# Patient Record
Sex: FEMALE | Race: WHITE | Employment: OTHER | URBAN - NONMETROPOLITAN AREA
[De-identification: names, ages, dates, MRNs, and addresses within clinical notes are randomized per-mention and may not be internally consistent; named-entity substitution may affect disease eponyms.]

---

## 2018-04-04 ENCOUNTER — OFFICE VISIT (OUTPATIENT)
Dept: PRIMARY CARE CLINIC | Age: 83
End: 2018-04-04
Payer: MEDICARE

## 2018-04-04 VITALS
SYSTOLIC BLOOD PRESSURE: 138 MMHG | WEIGHT: 108 LBS | HEIGHT: 62 IN | BODY MASS INDEX: 19.88 KG/M2 | DIASTOLIC BLOOD PRESSURE: 60 MMHG | HEART RATE: 56 BPM | OXYGEN SATURATION: 94 % | TEMPERATURE: 99.7 F

## 2018-04-04 DIAGNOSIS — R05.9 COUGH: ICD-10-CM

## 2018-04-04 DIAGNOSIS — J11.1 INFLUENZA-LIKE ILLNESS: Primary | ICD-10-CM

## 2018-04-04 LAB
INFLUENZA A ANTIBODY: NORMAL
INFLUENZA B ANTIBODY: NORMAL

## 2018-04-04 PROCEDURE — G8420 CALC BMI NORM PARAMETERS: HCPCS | Performed by: NURSE PRACTITIONER

## 2018-04-04 PROCEDURE — 4004F PT TOBACCO SCREEN RCVD TLK: CPT | Performed by: NURSE PRACTITIONER

## 2018-04-04 PROCEDURE — 87804 INFLUENZA ASSAY W/OPTIC: CPT | Performed by: NURSE PRACTITIONER

## 2018-04-04 PROCEDURE — 1090F PRES/ABSN URINE INCON ASSESS: CPT | Performed by: NURSE PRACTITIONER

## 2018-04-04 PROCEDURE — G8427 DOCREV CUR MEDS BY ELIG CLIN: HCPCS | Performed by: NURSE PRACTITIONER

## 2018-04-04 PROCEDURE — 99213 OFFICE O/P EST LOW 20 MIN: CPT | Performed by: NURSE PRACTITIONER

## 2018-04-04 PROCEDURE — 1123F ACP DISCUSS/DSCN MKR DOCD: CPT | Performed by: NURSE PRACTITIONER

## 2018-04-04 PROCEDURE — 4040F PNEUMOC VAC/ADMIN/RCVD: CPT | Performed by: NURSE PRACTITIONER

## 2018-04-04 RX ORDER — METOPROLOL TARTRATE 50 MG/1
25 TABLET, FILM COATED ORAL 2 TIMES DAILY
COMMUNITY
Start: 2018-01-05 | End: 2018-04-24 | Stop reason: SDUPTHER

## 2018-04-04 RX ORDER — HYDRALAZINE HYDROCHLORIDE 25 MG/1
25 TABLET, FILM COATED ORAL 2 TIMES DAILY
COMMUNITY
Start: 2018-03-11 | End: 2019-03-15 | Stop reason: SDUPTHER

## 2018-04-04 RX ORDER — EZETIMIBE 10 MG/1
TABLET ORAL
COMMUNITY
Start: 2018-02-07 | End: 2019-02-06 | Stop reason: SDUPTHER

## 2018-04-04 RX ORDER — BENZONATATE 100 MG/1
100 CAPSULE ORAL 3 TIMES DAILY PRN
Qty: 30 CAPSULE | Refills: 0 | Status: SHIPPED | OUTPATIENT
Start: 2018-04-04 | End: 2018-06-22 | Stop reason: ALTCHOICE

## 2018-04-04 RX ORDER — AMLODIPINE BESYLATE 10 MG/1
TABLET ORAL
COMMUNITY
Start: 2018-02-28 | End: 2018-04-24

## 2018-04-04 RX ORDER — OXYCODONE AND ACETAMINOPHEN 7.5; 325 MG/1; MG/1
TABLET ORAL
COMMUNITY
Start: 2018-03-11 | End: 2018-04-24 | Stop reason: CLARIF

## 2018-04-04 RX ORDER — CLORAZEPATE DIPOTASSIUM 3.75 MG/1
TABLET ORAL
COMMUNITY
Start: 2018-03-16 | End: 2018-06-22 | Stop reason: SDUPTHER

## 2018-04-04 RX ORDER — GABAPENTIN 300 MG/1
600 CAPSULE ORAL 2 TIMES DAILY
COMMUNITY
Start: 2018-03-05 | End: 2019-01-08 | Stop reason: SDUPTHER

## 2018-04-04 ASSESSMENT — ENCOUNTER SYMPTOMS
RHINORRHEA: 1
COUGH: 1
VOMITING: 0
WHEEZING: 0
SHORTNESS OF BREATH: 0
DIARRHEA: 0
SORE THROAT: 1
NAUSEA: 0

## 2018-04-24 ENCOUNTER — OFFICE VISIT (OUTPATIENT)
Dept: FAMILY MEDICINE CLINIC | Age: 83
End: 2018-04-24
Payer: MEDICARE

## 2018-04-24 VITALS
WEIGHT: 103.6 LBS | SYSTOLIC BLOOD PRESSURE: 150 MMHG | OXYGEN SATURATION: 97 % | BODY MASS INDEX: 20.34 KG/M2 | HEART RATE: 67 BPM | DIASTOLIC BLOOD PRESSURE: 62 MMHG | TEMPERATURE: 97.1 F | HEIGHT: 60 IN

## 2018-04-24 DIAGNOSIS — Z91.81 AT HIGH RISK FOR FALLS: ICD-10-CM

## 2018-04-24 DIAGNOSIS — R41.3 MEMORY LOSS: ICD-10-CM

## 2018-04-24 DIAGNOSIS — I10 ESSENTIAL HYPERTENSION: Primary | ICD-10-CM

## 2018-04-24 DIAGNOSIS — F51.04 PSYCHOPHYSIOLOGICAL INSOMNIA: ICD-10-CM

## 2018-04-24 PROCEDURE — 4040F PNEUMOC VAC/ADMIN/RCVD: CPT | Performed by: FAMILY MEDICINE

## 2018-04-24 PROCEDURE — G8427 DOCREV CUR MEDS BY ELIG CLIN: HCPCS | Performed by: FAMILY MEDICINE

## 2018-04-24 PROCEDURE — 1123F ACP DISCUSS/DSCN MKR DOCD: CPT | Performed by: FAMILY MEDICINE

## 2018-04-24 PROCEDURE — 1036F TOBACCO NON-USER: CPT | Performed by: FAMILY MEDICINE

## 2018-04-24 PROCEDURE — 1090F PRES/ABSN URINE INCON ASSESS: CPT | Performed by: FAMILY MEDICINE

## 2018-04-24 PROCEDURE — 99214 OFFICE O/P EST MOD 30 MIN: CPT | Performed by: FAMILY MEDICINE

## 2018-04-24 PROCEDURE — G8420 CALC BMI NORM PARAMETERS: HCPCS | Performed by: FAMILY MEDICINE

## 2018-04-24 RX ORDER — AMITRIPTYLINE HYDROCHLORIDE 10 MG/1
10 TABLET, FILM COATED ORAL
COMMUNITY
End: 2018-04-24

## 2018-04-24 RX ORDER — HYDROCHLOROTHIAZIDE 25 MG/1
25 TABLET ORAL
COMMUNITY
Start: 2017-07-17 | End: 2018-04-24

## 2018-04-24 RX ORDER — MIRTAZAPINE 15 MG/1
7.5 TABLET, FILM COATED ORAL NIGHTLY
Qty: 30 TABLET | Refills: 1 | Status: SHIPPED | OUTPATIENT
Start: 2018-04-24 | End: 2018-05-29 | Stop reason: SDUPTHER

## 2018-04-24 RX ORDER — ASPIRIN 81 MG/1
81 TABLET, CHEWABLE ORAL
Status: ON HOLD | COMMUNITY
End: 2021-02-14 | Stop reason: HOSPADM

## 2018-04-24 RX ORDER — LANCETS 30 GAUGE
EACH MISCELLANEOUS
COMMUNITY
Start: 2017-06-21 | End: 2018-05-29 | Stop reason: SDUPTHER

## 2018-04-24 RX ORDER — HYDROCHLOROTHIAZIDE 25 MG/1
25 TABLET ORAL DAILY
Qty: 90 TABLET | Refills: 3 | Status: SHIPPED | OUTPATIENT
Start: 2018-04-24 | End: 2019-03-27 | Stop reason: SDUPTHER

## 2018-04-24 RX ORDER — OMEPRAZOLE 20 MG/1
20 CAPSULE, DELAYED RELEASE ORAL
COMMUNITY
End: 2019-05-30

## 2018-04-24 RX ORDER — METOPROLOL TARTRATE 50 MG/1
25 TABLET, FILM COATED ORAL
COMMUNITY
Start: 2018-04-06 | End: 2018-04-24

## 2018-04-24 RX ORDER — OXYCODONE AND ACETAMINOPHEN 10; 325 MG/1; MG/1
1 TABLET ORAL EVERY 6 HOURS PRN
COMMUNITY
End: 2018-06-22 | Stop reason: SDUPTHER

## 2018-04-24 ASSESSMENT — ENCOUNTER SYMPTOMS
CHEST TIGHTNESS: 0
WHEEZING: 0
DIARRHEA: 0
CONSTIPATION: 0
COUGH: 0
ABDOMINAL PAIN: 0
SHORTNESS OF BREATH: 0

## 2018-04-24 ASSESSMENT — PATIENT HEALTH QUESTIONNAIRE - PHQ9
2. FEELING DOWN, DEPRESSED OR HOPELESS: 0
SUM OF ALL RESPONSES TO PHQ9 QUESTIONS 1 & 2: 0
SUM OF ALL RESPONSES TO PHQ QUESTIONS 1-9: 0
1. LITTLE INTEREST OR PLEASURE IN DOING THINGS: 0

## 2018-05-29 ENCOUNTER — OFFICE VISIT (OUTPATIENT)
Dept: FAMILY MEDICINE CLINIC | Age: 83
End: 2018-05-29
Payer: MEDICARE

## 2018-05-29 ENCOUNTER — HOSPITAL ENCOUNTER (OUTPATIENT)
Dept: LAB | Age: 83
Setting detail: SPECIMEN
Discharge: HOME | End: 2018-05-29
Payer: MEDICARE

## 2018-05-29 VITALS
BODY MASS INDEX: 19.98 KG/M2 | WEIGHT: 108.6 LBS | SYSTOLIC BLOOD PRESSURE: 120 MMHG | TEMPERATURE: 97.8 F | HEART RATE: 68 BPM | OXYGEN SATURATION: 96 % | HEIGHT: 62 IN | DIASTOLIC BLOOD PRESSURE: 62 MMHG

## 2018-05-29 DIAGNOSIS — F51.04 PSYCHOPHYSIOLOGICAL INSOMNIA: ICD-10-CM

## 2018-05-29 DIAGNOSIS — I10 ESSENTIAL HYPERTENSION: ICD-10-CM

## 2018-05-29 DIAGNOSIS — I10 ESSENTIAL HYPERTENSION: Primary | ICD-10-CM

## 2018-05-29 LAB
ANION GAP SERPL CALCULATED.3IONS-SCNC: 12 MMOL/L (ref 9–17)
BUN BLDV-MCNC: 13 MG/DL (ref 8–23)
BUN/CREAT BLD: 19 (ref 9–20)
CALCIUM SERPL-MCNC: 9.4 MG/DL (ref 8.6–10.4)
CHLORIDE BLD-SCNC: 96 MMOL/L (ref 98–107)
CHOLESTEROL/HDL RATIO: 2.9
CHOLESTEROL: 181 MG/DL
CO2: 30 MMOL/L (ref 20–31)
CREAT SERPL-MCNC: 0.7 MG/DL (ref 0.5–0.9)
GFR AFRICAN AMERICAN: >60 ML/MIN
GFR NON-AFRICAN AMERICAN: >60 ML/MIN
GFR SERPL CREATININE-BSD FRML MDRD: ABNORMAL ML/MIN/{1.73_M2}
GFR SERPL CREATININE-BSD FRML MDRD: ABNORMAL ML/MIN/{1.73_M2}
GLUCOSE BLD-MCNC: 123 MG/DL (ref 70–99)
HDLC SERPL-MCNC: 62 MG/DL
LDL CHOLESTEROL: 85 MG/DL (ref 0–130)
POTASSIUM SERPL-SCNC: 4 MMOL/L (ref 3.7–5.3)
SODIUM BLD-SCNC: 138 MMOL/L (ref 135–144)
TRIGL SERPL-MCNC: 169 MG/DL
VLDLC SERPL CALC-MCNC: ABNORMAL MG/DL (ref 1–30)

## 2018-05-29 PROCEDURE — 80061 LIPID PANEL: CPT

## 2018-05-29 PROCEDURE — 36415 COLL VENOUS BLD VENIPUNCTURE: CPT

## 2018-05-29 PROCEDURE — G8420 CALC BMI NORM PARAMETERS: HCPCS | Performed by: FAMILY MEDICINE

## 2018-05-29 PROCEDURE — 99213 OFFICE O/P EST LOW 20 MIN: CPT | Performed by: FAMILY MEDICINE

## 2018-05-29 PROCEDURE — 1090F PRES/ABSN URINE INCON ASSESS: CPT | Performed by: FAMILY MEDICINE

## 2018-05-29 PROCEDURE — 80048 BASIC METABOLIC PNL TOTAL CA: CPT

## 2018-05-29 PROCEDURE — 4040F PNEUMOC VAC/ADMIN/RCVD: CPT | Performed by: FAMILY MEDICINE

## 2018-05-29 PROCEDURE — G8427 DOCREV CUR MEDS BY ELIG CLIN: HCPCS | Performed by: FAMILY MEDICINE

## 2018-05-29 PROCEDURE — 1123F ACP DISCUSS/DSCN MKR DOCD: CPT | Performed by: FAMILY MEDICINE

## 2018-05-29 PROCEDURE — 1036F TOBACCO NON-USER: CPT | Performed by: FAMILY MEDICINE

## 2018-05-29 RX ORDER — AMLODIPINE BESYLATE 10 MG/1
10 TABLET ORAL DAILY
COMMUNITY
End: 2018-05-29 | Stop reason: SDUPTHER

## 2018-05-29 RX ORDER — LANCETS 30 GAUGE
EACH MISCELLANEOUS
Qty: 100 EACH | Refills: 1 | Status: SHIPPED | OUTPATIENT
Start: 2018-05-29 | End: 2020-06-23 | Stop reason: SDUPTHER

## 2018-05-29 RX ORDER — AMLODIPINE BESYLATE 10 MG/1
10 TABLET ORAL DAILY
Qty: 90 TABLET | Refills: 3 | Status: SHIPPED | OUTPATIENT
Start: 2018-05-29 | End: 2019-05-30 | Stop reason: SDUPTHER

## 2018-05-29 RX ORDER — MIRTAZAPINE 15 MG/1
7.5 TABLET, FILM COATED ORAL NIGHTLY
Qty: 90 TABLET | Refills: 2 | Status: SHIPPED | OUTPATIENT
Start: 2018-05-29 | End: 2018-11-29 | Stop reason: SDUPTHER

## 2018-05-29 ASSESSMENT — ENCOUNTER SYMPTOMS
WHEEZING: 0
SHORTNESS OF BREATH: 0
COUGH: 0
CHEST TIGHTNESS: 0

## 2018-06-11 ENCOUNTER — TELEPHONE (OUTPATIENT)
Dept: FAMILY MEDICINE CLINIC | Age: 83
End: 2018-06-11

## 2018-06-18 ENCOUNTER — TELEPHONE (OUTPATIENT)
Dept: FAMILY MEDICINE CLINIC | Age: 83
End: 2018-06-18

## 2018-06-18 NOTE — TELEPHONE ENCOUNTER
Patient being d/c'd [facility] today 6/18/18 with UTI and infection of gallbladder. Will need transitional care call to be made on 6/19/18/.      Transitional care appt made for 6/22/18

## 2018-06-22 ENCOUNTER — OFFICE VISIT (OUTPATIENT)
Dept: FAMILY MEDICINE CLINIC | Age: 83
End: 2018-06-22
Payer: MEDICARE

## 2018-06-22 ENCOUNTER — HOSPITAL ENCOUNTER (OUTPATIENT)
Dept: LAB | Age: 83
Setting detail: SPECIMEN
Discharge: HOME | End: 2018-06-22
Payer: MEDICARE

## 2018-06-22 VITALS
TEMPERATURE: 97 F | OXYGEN SATURATION: 96 % | HEART RATE: 63 BPM | BODY MASS INDEX: 21.79 KG/M2 | WEIGHT: 111 LBS | DIASTOLIC BLOOD PRESSURE: 60 MMHG | HEIGHT: 60 IN | SYSTOLIC BLOOD PRESSURE: 124 MMHG

## 2018-06-22 DIAGNOSIS — R30.0 DYSURIA: ICD-10-CM

## 2018-06-22 DIAGNOSIS — F51.04 PSYCHOPHYSIOLOGICAL INSOMNIA: ICD-10-CM

## 2018-06-22 DIAGNOSIS — K81.9 CHOLECYSTITIS: Primary | ICD-10-CM

## 2018-06-22 DIAGNOSIS — M54.50 CHRONIC MIDLINE LOW BACK PAIN WITHOUT SCIATICA: ICD-10-CM

## 2018-06-22 DIAGNOSIS — N30.00 ACUTE CYSTITIS WITHOUT HEMATURIA: ICD-10-CM

## 2018-06-22 DIAGNOSIS — G89.29 CHRONIC MIDLINE LOW BACK PAIN WITHOUT SCIATICA: ICD-10-CM

## 2018-06-22 LAB
-: NORMAL
AMORPHOUS: NORMAL
BACTERIA: NORMAL
BILIRUBIN URINE: NEGATIVE
CASTS UA: NORMAL /LPF (ref 0–2)
COLOR: ABNORMAL
COMMENT UA: ABNORMAL
CRYSTALS, UA: NORMAL /HPF
EPITHELIAL CELLS UA: NORMAL /HPF (ref 0–5)
GLUCOSE URINE: NEGATIVE
KETONES, URINE: NEGATIVE
LEUKOCYTE ESTERASE, URINE: NEGATIVE
MUCUS: NORMAL
NITRITE, URINE: NEGATIVE
OTHER OBSERVATIONS UA: NORMAL
PH UA: 7.5 (ref 5–6)
PROTEIN UA: ABNORMAL
RBC UA: NORMAL /HPF (ref 0–4)
RENAL EPITHELIAL, UA: NORMAL /HPF
SPECIFIC GRAVITY UA: 1.01 (ref 1.01–1.02)
TRICHOMONAS: NORMAL
TURBIDITY: ABNORMAL
URINE HGB: NEGATIVE
UROBILINOGEN, URINE: NORMAL
WBC UA: NORMAL /HPF (ref 0–4)
YEAST: NORMAL

## 2018-06-22 PROCEDURE — 1111F DSCHRG MED/CURRENT MED MERGE: CPT | Performed by: FAMILY MEDICINE

## 2018-06-22 PROCEDURE — 81001 URINALYSIS AUTO W/SCOPE: CPT

## 2018-06-22 PROCEDURE — 99495 TRANSJ CARE MGMT MOD F2F 14D: CPT | Performed by: FAMILY MEDICINE

## 2018-06-22 RX ORDER — OXYCODONE AND ACETAMINOPHEN 10; 325 MG/1; MG/1
1 TABLET ORAL EVERY 6 HOURS PRN
Qty: 120 TABLET | Refills: 0 | Status: SHIPPED | OUTPATIENT
Start: 2018-06-22 | End: 2018-06-22 | Stop reason: SDUPTHER

## 2018-06-22 RX ORDER — CEFDINIR 300 MG/1
CAPSULE ORAL
COMMUNITY
Start: 2018-06-17 | End: 2018-11-29 | Stop reason: ALTCHOICE

## 2018-06-22 RX ORDER — PHENAZOPYRIDINE HYDROCHLORIDE 200 MG/1
TABLET, FILM COATED ORAL
COMMUNITY
Start: 2018-06-17 | End: 2018-06-22 | Stop reason: ALTCHOICE

## 2018-06-22 RX ORDER — CLORAZEPATE DIPOTASSIUM 3.75 MG/1
3.75 TABLET ORAL NIGHTLY
Qty: 90 TABLET | Refills: 3 | Status: SHIPPED | OUTPATIENT
Start: 2018-06-22 | End: 2018-09-20

## 2018-06-22 RX ORDER — OXYCODONE AND ACETAMINOPHEN 10; 325 MG/1; MG/1
1 TABLET ORAL EVERY 6 HOURS PRN
Qty: 120 TABLET | Refills: 0 | Status: SHIPPED | OUTPATIENT
Start: 2018-06-22 | End: 2018-07-13 | Stop reason: DRUGHIGH

## 2018-06-22 ASSESSMENT — ENCOUNTER SYMPTOMS
SHORTNESS OF BREATH: 0
WHEEZING: 0
DIARRHEA: 0
NAUSEA: 0
COUGH: 0
COLOR CHANGE: 0
CONSTIPATION: 0
ABDOMINAL PAIN: 0
CHEST TIGHTNESS: 0

## 2018-07-13 ENCOUNTER — TELEPHONE (OUTPATIENT)
Dept: FAMILY MEDICINE CLINIC | Age: 83
End: 2018-07-13

## 2018-07-13 DIAGNOSIS — G89.29 CHRONIC MIDLINE LOW BACK PAIN WITHOUT SCIATICA: Primary | ICD-10-CM

## 2018-07-13 DIAGNOSIS — M54.50 CHRONIC MIDLINE LOW BACK PAIN WITHOUT SCIATICA: Primary | ICD-10-CM

## 2018-07-13 RX ORDER — OXYCODONE AND ACETAMINOPHEN 7.5; 325 MG/1; MG/1
1 TABLET ORAL EVERY 6 HOURS PRN
Qty: 120 TABLET | Refills: 0 | Status: SHIPPED | OUTPATIENT
Start: 2018-09-10 | End: 2018-10-10

## 2018-07-13 RX ORDER — OXYCODONE AND ACETAMINOPHEN 7.5; 325 MG/1; MG/1
1 TABLET ORAL EVERY 6 HOURS PRN
Qty: 120 TABLET | Refills: 0 | Status: SHIPPED | OUTPATIENT
Start: 2018-08-11 | End: 2018-10-11 | Stop reason: SDUPTHER

## 2018-08-13 DIAGNOSIS — M54.50 CHRONIC MIDLINE LOW BACK PAIN WITHOUT SCIATICA: ICD-10-CM

## 2018-08-13 DIAGNOSIS — G89.29 CHRONIC MIDLINE LOW BACK PAIN WITHOUT SCIATICA: ICD-10-CM

## 2018-08-13 RX ORDER — OXYCODONE AND ACETAMINOPHEN 7.5; 325 MG/1; MG/1
1 TABLET ORAL EVERY 6 HOURS PRN
Qty: 120 TABLET | Refills: 0 | OUTPATIENT
Start: 2018-08-13 | End: 2018-09-12

## 2018-09-07 ENCOUNTER — HOSPITAL ENCOUNTER (OUTPATIENT)
Dept: GENERAL RADIOLOGY | Age: 83
Discharge: HOME | End: 2018-09-09
Payer: MEDICARE

## 2018-09-07 ENCOUNTER — OFFICE VISIT (OUTPATIENT)
Dept: PRIMARY CARE CLINIC | Age: 83
End: 2018-09-07
Payer: MEDICARE

## 2018-09-07 VITALS
BODY MASS INDEX: 19.69 KG/M2 | RESPIRATION RATE: 18 BRPM | HEIGHT: 62 IN | HEART RATE: 56 BPM | WEIGHT: 107 LBS | SYSTOLIC BLOOD PRESSURE: 138 MMHG | DIASTOLIC BLOOD PRESSURE: 68 MMHG

## 2018-09-07 DIAGNOSIS — M25.561 ACUTE PAIN OF BOTH KNEES: ICD-10-CM

## 2018-09-07 DIAGNOSIS — S80.00XA CONTUSION OF KNEE, UNSPECIFIED LATERALITY, INITIAL ENCOUNTER: ICD-10-CM

## 2018-09-07 DIAGNOSIS — M25.562 ACUTE PAIN OF BOTH KNEES: ICD-10-CM
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
## 2018-09-07 PROCEDURE — 73562 X-RAY EXAM OF KNEE 3: CPT

## 2018-09-07 PROCEDURE — G8427 DOCREV CUR MEDS BY ELIG CLIN: HCPCS | Performed by: NURSE PRACTITIONER

## 2018-09-07 PROCEDURE — 1090F PRES/ABSN URINE INCON ASSESS: CPT | Performed by: NURSE PRACTITIONER

## 2018-09-07 PROCEDURE — 1036F TOBACCO NON-USER: CPT | Performed by: NURSE PRACTITIONER

## 2018-09-07 PROCEDURE — 99213 OFFICE O/P EST LOW 20 MIN: CPT | Performed by: NURSE PRACTITIONER

## 2018-09-07 PROCEDURE — 1101F PT FALLS ASSESS-DOCD LE1/YR: CPT | Performed by: NURSE PRACTITIONER

## 2018-09-07 PROCEDURE — 1123F ACP DISCUSS/DSCN MKR DOCD: CPT | Performed by: NURSE PRACTITIONER

## 2018-09-07 PROCEDURE — G8420 CALC BMI NORM PARAMETERS: HCPCS | Performed by: NURSE PRACTITIONER

## 2018-09-07 PROCEDURE — 4040F PNEUMOC VAC/ADMIN/RCVD: CPT | Performed by: NURSE PRACTITIONER

## 2018-09-07 RX ORDER — NAPROXEN 500 MG/1
500 TABLET ORAL 2 TIMES DAILY WITH MEALS
Qty: 60 TABLET | Refills: 0 | Status: SHIPPED | OUTPATIENT
Start: 2018-09-07 | End: 2018-11-29 | Stop reason: ALTCHOICE

## 2018-09-07 ASSESSMENT — ENCOUNTER SYMPTOMS: RESPIRATORY NEGATIVE: 1

## 2018-09-07 NOTE — PATIENT INSTRUCTIONS
Patient Education        Contusion: Care Instructions  Your Care Instructions    Contusion is the medical term for a bruise. It is the result of a direct blow or an impact, such as a fall. Contusions are common sports injuries. Most people think of a bruise as a black-and-blue spot. This happens when small blood vessels get torn and leak blood under the skin. But bones, muscles, and organs can also get bruised. This may damage deep tissues but not cause a bruise you can see. The doctor will do a physical exam to find the location of your contusion. You may also have tests to make sure you do not have a more serious injury, such as a broken bone or nerve damage. These may include X-rays or other imaging tests like a CT scan or MRI. Deep-tissue contusions may cause pain and swelling. But if there is no serious damage, they will often get better in a few weeks with home treatment. The doctor has checked you carefully, but problems can develop later. If you notice any problems or new symptoms, get medical treatment right away. Follow-up care is a key part of your treatment and safety. Be sure to make and go to all appointments, and call your doctor if you are having problems. It's also a good idea to know your test results and keep a list of the medicines you take. How can you care for yourself at home? · Put ice or a cold pack on the sore area for 10 to 20 minutes at a time to stop swelling. Put a thin cloth between the ice pack and your skin. · Be safe with medicines. Read and follow all instructions on the label. ¨ If the doctor gave you a prescription medicine for pain, take it as prescribed. ¨ If you are not taking a prescription pain medicine, ask your doctor if you can take an over-the-counter medicine. · If you can, prop up the sore area on pillows as much as possible for the next few days. Try to keep the sore area above the level of your heart. When should you call for help?   Call your doctor now   · You have signs of infection, such as:  ¨ Increased pain, swelling, warmth, or redness. ¨ Red streaks leading from the knee. ¨ Pus draining from a place on your knee. ¨ A fever.     · You have signs of a blood clot in your leg (called a deep vein thrombosis), such as:  ¨ Pain in your calf, back of the knee, thigh, or groin. ¨ Redness and swelling in your leg or groin.    Watch closely for changes in your health, and be sure to contact your doctor if:    · You have tingling, weakness, or numbness in your knee.     · You have any new symptoms, such as swelling.     · You have bruises from a knee injury that last longer than 2 weeks.     · You do not get better as expected. Where can you learn more? Go to [email or web address] and sign in to your [employer] account. Enter K195 in the ki work box to learn more about \"Knee Pain or Injury: Care Instructions. \"     If you do not have an account, please click on the \"Sign Up Now\" link. Current as of: November 20, 2017  Content Version: 11.7  © 2608-2515 [employer]. Care instructions adapted under license by [facility] ([facility]). If you have questions about a medical condition or this instruction, always ask your healthcare professional. [address] any warranty or liability for your use of this information.

## 2018-09-07 NOTE — PROGRESS NOTES
Manuel Mcdonald  9/7/18    Chief Complaint   Patient presents with    Joint Pain     bilateral knees and legs are sore and bruised from fall       HPI: Therishabh Haysi on Thursday at the [facility] landing on both knees. She was able to get up on her own. No LOC, no bleeding. [place]. Pain has continued to worsen. She has taken some Percocet at home for the pain and it has helped some. Otherwise she has done no other treatments. 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      Social History Main Topics    Smoking status: Never Smoker    Smokeless tobacco: Never Used    Alcohol use No    Drug use: No    Sexual activity: No     Other Topics Concern    Not on file     Social History Narrative    No narrative on file       No results found for: LABA1C  No results found for: EAG  No results found for: WBC, HGB, HCT, MCV, PLT  Lab Results   Component Value Date     05/29/2018    K 4.0 05/29/2018    CL 96 (L) 05/29/2018    CO2 30 05/29/2018    BUN 13 05/29/2018    CREATININE 0.70 05/29/2018    GLUCOSE 123 (H) 05/29/2018    CALCIUM 9.4 05/29/2018    LABGLOM >60 05/29/2018    GFRAA >60 05/29/2018       Outpatient Encounter Prescriptions as of 9/7/2018   Medication Sig Dispense Refill    naproxen (NAPROSYN) 500 MG tablet Take 1 tablet by mouth 2 times daily (with meals) 60 tablet 0   

## 2018-10-04 DIAGNOSIS — F51.04 PSYCHOPHYSIOLOGICAL INSOMNIA: ICD-10-CM

## 2018-10-04 RX ORDER — CLORAZEPATE DIPOTASSIUM 3.75 MG/1
3.75 TABLET ORAL NIGHTLY
Qty: 90 TABLET | Refills: 0 | OUTPATIENT
Start: 2018-10-04 | End: 2019-01-02

## 2018-10-04 NOTE — TELEPHONE ENCOUNTER
Last Appt:  6/22/2018  Next Appt:   11/29/2018  Med verified in [facility] 10/4/18

## 2018-10-08 DIAGNOSIS — F51.04 PSYCHOPHYSIOLOGICAL INSOMNIA: ICD-10-CM

## 2018-10-08 RX ORDER — CLORAZEPATE DIPOTASSIUM 3.75 MG/1
3.75 TABLET ORAL NIGHTLY
Qty: 90 TABLET | Refills: 3 | OUTPATIENT
Start: 2018-10-08 | End: 2019-01-06

## 2018-10-08 NOTE — TELEPHONE ENCOUNTER
Controlled Substances Monitoring:     RX Monitoring 10/8/2018   Attestation The Prescription Monitoring Report for this patient was reviewed today. Documentation -   Chronic Pain -     Please verify. Patient had #90 with refill sent in 6/2018. Did she change pharmacies? If yes, [facility]

## 2018-10-11 DIAGNOSIS — M54.50 CHRONIC MIDLINE LOW BACK PAIN WITHOUT SCIATICA: ICD-10-CM

## 2018-10-11 DIAGNOSIS — G89.29 CHRONIC MIDLINE LOW BACK PAIN WITHOUT SCIATICA: ICD-10-CM

## 2018-10-11 RX ORDER — OXYCODONE AND ACETAMINOPHEN 7.5; 325 MG/1; MG/1
1 TABLET ORAL EVERY 6 HOURS PRN
Qty: 120 TABLET | Refills: 0 | Status: SHIPPED | OUTPATIENT
Start: 2018-10-11 | End: 2018-11-09 | Stop reason: SDUPTHER

## 2018-11-09 DIAGNOSIS — M54.50 CHRONIC MIDLINE LOW BACK PAIN WITHOUT SCIATICA: ICD-10-CM

## 2018-11-09 DIAGNOSIS — G89.29 CHRONIC MIDLINE LOW BACK PAIN WITHOUT SCIATICA: ICD-10-CM

## 2018-11-09 RX ORDER — OXYCODONE AND ACETAMINOPHEN 7.5; 325 MG/1; MG/1
1 TABLET ORAL EVERY 6 HOURS PRN
Qty: 120 TABLET | Refills: 0 | Status: SHIPPED | OUTPATIENT
Start: 2018-11-09 | End: 2018-12-11 | Stop reason: SDUPTHER

## 2018-11-29 ENCOUNTER — OFFICE VISIT (OUTPATIENT)
Dept: FAMILY MEDICINE CLINIC | Age: 83
End: 2018-11-29
Payer: MEDICARE

## 2018-11-29 VITALS
BODY MASS INDEX: 18.66 KG/M2 | SYSTOLIC BLOOD PRESSURE: 102 MMHG | OXYGEN SATURATION: 95 % | TEMPERATURE: 97 F | DIASTOLIC BLOOD PRESSURE: 56 MMHG | WEIGHT: 102 LBS | HEART RATE: 57 BPM

## 2018-11-29 DIAGNOSIS — T40.2X5A THERAPEUTIC OPIOID-INDUCED CONSTIPATION (OIC): ICD-10-CM

## 2018-11-29 DIAGNOSIS — K59.03 THERAPEUTIC OPIOID-INDUCED CONSTIPATION (OIC): ICD-10-CM

## 2018-11-29 DIAGNOSIS — F51.04 PSYCHOPHYSIOLOGICAL INSOMNIA: Primary | ICD-10-CM

## 2018-11-29 DIAGNOSIS — R63.4 ABNORMAL WEIGHT LOSS: ICD-10-CM

## 2018-11-29 PROCEDURE — G8420 CALC BMI NORM PARAMETERS: HCPCS | Performed by: FAMILY MEDICINE

## 2018-11-29 PROCEDURE — G8482 FLU IMMUNIZE ORDER/ADMIN: HCPCS | Performed by: FAMILY MEDICINE

## 2018-11-29 PROCEDURE — 1036F TOBACCO NON-USER: CPT | Performed by: FAMILY MEDICINE

## 2018-11-29 PROCEDURE — 1090F PRES/ABSN URINE INCON ASSESS: CPT | Performed by: FAMILY MEDICINE

## 2018-11-29 PROCEDURE — G0008 ADMIN INFLUENZA VIRUS VAC: HCPCS | Performed by: FAMILY MEDICINE

## 2018-11-29 PROCEDURE — 99214 OFFICE O/P EST MOD 30 MIN: CPT | Performed by: FAMILY MEDICINE

## 2018-11-29 PROCEDURE — G8427 DOCREV CUR MEDS BY ELIG CLIN: HCPCS | Performed by: FAMILY MEDICINE

## 2018-11-29 PROCEDURE — 1101F PT FALLS ASSESS-DOCD LE1/YR: CPT | Performed by: FAMILY MEDICINE

## 2018-11-29 PROCEDURE — 90662 IIV NO PRSV INCREASED AG IM: CPT | Performed by: FAMILY MEDICINE

## 2018-11-29 PROCEDURE — 4040F PNEUMOC VAC/ADMIN/RCVD: CPT | Performed by: FAMILY MEDICINE

## 2018-11-29 PROCEDURE — 1123F ACP DISCUSS/DSCN MKR DOCD: CPT | Performed by: FAMILY MEDICINE

## 2018-11-29 RX ORDER — MIRTAZAPINE 15 MG/1
15 TABLET, FILM COATED ORAL NIGHTLY
Qty: 90 TABLET | Refills: 2 | Status: SHIPPED | OUTPATIENT
Start: 2018-11-29 | End: 2019-05-30 | Stop reason: SDUPTHER

## 2018-11-29 RX ORDER — CLORAZEPATE DIPOTASSIUM 3.75 MG/1
3.75 TABLET ORAL
COMMUNITY
Start: 2018-03-05 | End: 2019-05-30 | Stop reason: ALTCHOICE

## 2018-11-29 ASSESSMENT — ENCOUNTER SYMPTOMS
NAUSEA: 0
DIARRHEA: 0
WHEEZING: 0
CHEST TIGHTNESS: 0
COLOR CHANGE: 0
CONSTIPATION: 1
COUGH: 0
SHORTNESS OF BREATH: 0
ABDOMINAL PAIN: 0

## 2018-11-29 NOTE — PROGRESS NOTES
days. 120 tablet 0    amLODIPine (NORVASC) 10 MG tablet Take 1 tablet by mouth daily 90 tablet 3    Lancets MISC Test sugars once daily. R73.03 100 each 1    glucose blood VI test strips (ASCENSIA AUTODISC VI;ONE TOUCH ULTRA TEST VI) strip 1 each by In Vitro route daily As needed. 100 each 1    aspirin 81 MG chewable tablet Take 81 mg by mouth      omeprazole (PRILOSEC) 20 MG delayed release capsule Take 20 mg by mouth      metoprolol tartrate (LOPRESSOR) 25 MG tablet Take 1 tablet by mouth 2 times daily 180 tablet 3    hydrochlorothiazide (HYDRODIURIL) 25 MG tablet Take 1 tablet by mouth daily 90 tablet 3    ezetimibe (ZETIA) 10 MG tablet       gabapentin (NEURONTIN) 300 MG capsule Take 600 mg by mouth 2 times daily. Melvenia Monique hydrALAZINE (APRESOLINE) 25 MG tablet 25 mg 2 times daily        No current facility-administered medications for this visit. Allergies   Allergen Reactions    Atorvastatin      Other reaction(s): Muscle Pain    Codeine     Diltiazem Hcl Hives    Levofloxacin      Other reaction(s): Intolerance-unknown    Pregabalin      lyrica    Simvastatin      Other reaction(s): Muscle Pain       Subjective:     Review of Systems   Constitutional: Negative for activity change, appetite change, chills, fatigue and fever. Eyes: Negative for visual disturbance. Respiratory: Negative for cough, chest tightness, shortness of breath and wheezing. Cardiovascular: Negative for chest pain, palpitations and leg swelling. Gastrointestinal: Positive for constipation. Negative for abdominal pain, diarrhea and nausea. Genitourinary: Negative for difficulty urinating. Skin: Negative for color change and rash. Neurological: Negative for dizziness, syncope, weakness, light-headedness and headaches. Psychiatric/Behavioral: Positive for sleep disturbance. Negative for dysphoric mood. The patient is not nervous/anxious.         Objective:     Physical Exam   Constitutional: She is

## 2018-12-11 DIAGNOSIS — G89.29 CHRONIC MIDLINE LOW BACK PAIN WITHOUT SCIATICA: ICD-10-CM

## 2018-12-11 DIAGNOSIS — M54.50 CHRONIC MIDLINE LOW BACK PAIN WITHOUT SCIATICA: ICD-10-CM

## 2018-12-11 RX ORDER — OXYCODONE AND ACETAMINOPHEN 7.5; 325 MG/1; MG/1
1 TABLET ORAL EVERY 6 HOURS PRN
Qty: 120 TABLET | Refills: 0 | Status: SHIPPED | OUTPATIENT
Start: 2018-12-11 | End: 2018-12-11 | Stop reason: SDUPTHER

## 2018-12-11 RX ORDER — OXYCODONE AND ACETAMINOPHEN 7.5; 325 MG/1; MG/1
1 TABLET ORAL EVERY 6 HOURS PRN
Qty: 120 TABLET | Refills: 0 | Status: SHIPPED | OUTPATIENT
Start: 2018-12-11 | End: 2019-02-11 | Stop reason: SDUPTHER

## 2019-01-08 RX ORDER — GABAPENTIN 300 MG/1
600 CAPSULE ORAL 2 TIMES DAILY
Qty: 120 CAPSULE | Refills: 2 | Status: SHIPPED | OUTPATIENT
Start: 2019-01-08 | End: 2019-05-30 | Stop reason: SDUPTHER

## 2019-01-11 DIAGNOSIS — M54.50 CHRONIC MIDLINE LOW BACK PAIN WITHOUT SCIATICA: ICD-10-CM

## 2019-01-11 DIAGNOSIS — G89.29 CHRONIC MIDLINE LOW BACK PAIN WITHOUT SCIATICA: ICD-10-CM

## 2019-01-11 RX ORDER — OXYCODONE AND ACETAMINOPHEN 7.5; 325 MG/1; MG/1
1 TABLET ORAL EVERY 6 HOURS PRN
Qty: 120 TABLET | Refills: 0 | OUTPATIENT
Start: 2019-01-11 | End: 2019-02-10

## 2019-02-06 DIAGNOSIS — E78.2 ELEVATED TRIGLYCERIDES WITH HIGH CHOLESTEROL: Primary | ICD-10-CM

## 2019-02-06 RX ORDER — EZETIMIBE 10 MG/1
10 TABLET ORAL DAILY
Qty: 90 TABLET | Refills: 0 | Status: SHIPPED | OUTPATIENT
Start: 2019-02-06 | End: 2019-05-08 | Stop reason: SDUPTHER

## 2019-02-11 ENCOUNTER — TELEPHONE (OUTPATIENT)
Dept: FAMILY MEDICINE CLINIC | Age: 84
End: 2019-02-11

## 2019-02-11 DIAGNOSIS — G89.29 CHRONIC MIDLINE LOW BACK PAIN WITHOUT SCIATICA: ICD-10-CM

## 2019-02-11 DIAGNOSIS — M54.50 CHRONIC MIDLINE LOW BACK PAIN WITHOUT SCIATICA: ICD-10-CM

## 2019-02-11 RX ORDER — OXYCODONE AND ACETAMINOPHEN 7.5; 325 MG/1; MG/1
1 TABLET ORAL EVERY 6 HOURS PRN
Qty: 120 TABLET | Refills: 0 | Status: SHIPPED | OUTPATIENT
Start: 2019-02-11 | End: 2019-02-11 | Stop reason: SDUPTHER

## 2019-03-11 DIAGNOSIS — M54.50 CHRONIC MIDLINE LOW BACK PAIN WITHOUT SCIATICA: ICD-10-CM

## 2019-03-11 DIAGNOSIS — G89.29 CHRONIC MIDLINE LOW BACK PAIN WITHOUT SCIATICA: ICD-10-CM

## 2019-03-12 RX ORDER — OXYCODONE AND ACETAMINOPHEN 7.5; 325 MG/1; MG/1
1 TABLET ORAL EVERY 6 HOURS PRN
Qty: 120 TABLET | Refills: 0 | Status: SHIPPED | OUTPATIENT
Start: 2019-03-13 | End: 2019-04-11 | Stop reason: SDUPTHER

## 2019-03-15 DIAGNOSIS — I10 ESSENTIAL HYPERTENSION: Primary | ICD-10-CM

## 2019-03-15 RX ORDER — HYDRALAZINE HYDROCHLORIDE 25 MG/1
25 TABLET, FILM COATED ORAL 2 TIMES DAILY
Qty: 60 TABLET | Refills: 0 | Status: SHIPPED | OUTPATIENT
Start: 2019-03-15 | End: 2019-05-06 | Stop reason: SDUPTHER

## 2019-03-27 RX ORDER — HYDROCHLOROTHIAZIDE 25 MG/1
TABLET ORAL
Qty: 90 TABLET | Refills: 3 | Status: SHIPPED | OUTPATIENT
Start: 2019-03-27 | End: 2020-01-15

## 2019-04-11 DIAGNOSIS — G89.29 CHRONIC MIDLINE LOW BACK PAIN WITHOUT SCIATICA: ICD-10-CM

## 2019-04-11 DIAGNOSIS — M54.50 CHRONIC MIDLINE LOW BACK PAIN WITHOUT SCIATICA: ICD-10-CM

## 2019-04-11 RX ORDER — OXYCODONE AND ACETAMINOPHEN 7.5; 325 MG/1; MG/1
1 TABLET ORAL EVERY 6 HOURS PRN
Qty: 120 TABLET | Refills: 0 | Status: SHIPPED | OUTPATIENT
Start: 2019-04-11 | End: 2019-05-09 | Stop reason: SDUPTHER

## 2019-05-06 DIAGNOSIS — I10 ESSENTIAL HYPERTENSION: ICD-10-CM

## 2019-05-06 RX ORDER — HYDRALAZINE HYDROCHLORIDE 25 MG/1
25 TABLET, FILM COATED ORAL 2 TIMES DAILY
Qty: 180 TABLET | Refills: 2 | Status: SHIPPED | OUTPATIENT
Start: 2019-05-06 | End: 2019-12-09 | Stop reason: SDUPTHER

## 2019-05-08 DIAGNOSIS — E78.2 ELEVATED TRIGLYCERIDES WITH HIGH CHOLESTEROL: ICD-10-CM

## 2019-05-08 RX ORDER — EZETIMIBE 10 MG/1
TABLET ORAL
Qty: 90 TABLET | Refills: 2 | Status: SHIPPED | OUTPATIENT
Start: 2019-05-08 | End: 2020-02-03

## 2019-05-09 DIAGNOSIS — G89.29 CHRONIC MIDLINE LOW BACK PAIN WITHOUT SCIATICA: ICD-10-CM

## 2019-05-09 DIAGNOSIS — M54.50 CHRONIC MIDLINE LOW BACK PAIN WITHOUT SCIATICA: ICD-10-CM

## 2019-05-09 RX ORDER — OXYCODONE AND ACETAMINOPHEN 7.5; 325 MG/1; MG/1
1 TABLET ORAL EVERY 6 HOURS PRN
Qty: 120 TABLET | Refills: 0 | Status: SHIPPED | OUTPATIENT
Start: 2019-05-09 | End: 2019-05-30 | Stop reason: SDUPTHER

## 2019-05-09 NOTE — TELEPHONE ENCOUNTER
Last Appt:  11/29/2018  Next Appt:   5/30/2019  Med verified in [facility] 5/9/19

## 2019-05-30 ENCOUNTER — OFFICE VISIT (OUTPATIENT)
Dept: FAMILY MEDICINE CLINIC | Age: 84
End: 2019-05-30
Payer: MEDICARE

## 2019-05-30 ENCOUNTER — HOSPITAL ENCOUNTER (OUTPATIENT)
Dept: LAB | Age: 84
Discharge: HOME | End: 2019-05-30
Payer: MEDICARE

## 2019-05-30 VITALS
DIASTOLIC BLOOD PRESSURE: 58 MMHG | OXYGEN SATURATION: 95 % | HEART RATE: 57 BPM | BODY MASS INDEX: 19.14 KG/M2 | SYSTOLIC BLOOD PRESSURE: 110 MMHG | WEIGHT: 104 LBS | HEIGHT: 62 IN

## 2019-05-30 DIAGNOSIS — E78.2 ELEVATED TRIGLYCERIDES WITH HIGH CHOLESTEROL: ICD-10-CM

## 2019-05-30 DIAGNOSIS — G89.29 CHRONIC RIGHT SHOULDER PAIN: ICD-10-CM

## 2019-05-30 DIAGNOSIS — M54.50 CHRONIC MIDLINE LOW BACK PAIN WITHOUT SCIATICA: ICD-10-CM

## 2019-05-30 DIAGNOSIS — M25.511 CHRONIC RIGHT SHOULDER PAIN: ICD-10-CM

## 2019-05-30 DIAGNOSIS — G25.81 RLS (RESTLESS LEGS SYNDROME): ICD-10-CM

## 2019-05-30 DIAGNOSIS — G89.29 CHRONIC MIDLINE LOW BACK PAIN WITHOUT SCIATICA: ICD-10-CM

## 2019-05-30 DIAGNOSIS — Z00.00 ROUTINE GENERAL MEDICAL EXAMINATION AT A HEALTH CARE FACILITY: Primary | ICD-10-CM

## 2019-05-30 PROBLEM — H18.519 ENDOTHELIAL CORNEAL DYSTROPHY: Status: ACTIVE | Noted: 2017-03-24

## 2019-05-30 PROBLEM — R73.03 PREDIABETES: Status: ACTIVE | Noted: 2017-03-22

## 2019-05-30 PROBLEM — R06.02 SHORTNESS OF BREATH: Status: ACTIVE | Noted: 2018-05-22

## 2019-05-30 PROBLEM — H35.3131 EARLY STAGE NONEXUDATIVE AGE-RELATED MACULAR DEGENERATION OF BOTH EYES: Status: ACTIVE | Noted: 2017-03-24

## 2019-05-30 PROBLEM — H04.123 DRY EYE SYNDROME OF BOTH LACRIMAL GLANDS: Status: ACTIVE | Noted: 2017-03-24

## 2019-05-30 LAB
ABSOLUTE EOS #: 0.1 K/UL (ref 0–0.4)
ABSOLUTE IMMATURE GRANULOCYTE: ABNORMAL K/UL (ref 0–0.3)
ABSOLUTE LYMPH #: 2.8 K/UL (ref 1–4.8)
ABSOLUTE MONO #: 0.8 K/UL (ref 0.1–1.2)
ANION GAP SERPL CALCULATED.3IONS-SCNC: 12 MMOL/L (ref 9–17)
BASOPHILS # BLD: 1 % (ref 0–1)
BASOPHILS ABSOLUTE: 0.1 K/UL (ref 0–0.2)
BUN BLDV-MCNC: 14 MG/DL (ref 8–23)
BUN/CREAT BLD: 17 (ref 9–20)
CALCIUM SERPL-MCNC: 9.5 MG/DL (ref 8.6–10.4)
CHLORIDE BLD-SCNC: 97 MMOL/L (ref 98–107)
CHOLESTEROL/HDL RATIO: 2.8
CHOLESTEROL: 182 MG/DL
CO2: 29 MMOL/L (ref 20–31)
CREAT SERPL-MCNC: 0.84 MG/DL (ref 0.5–0.9)
DIFFERENTIAL TYPE: ABNORMAL
EOSINOPHILS RELATIVE PERCENT: 1 % (ref 1–7)
GFR AFRICAN AMERICAN: >60 ML/MIN
GFR NON-AFRICAN AMERICAN: >60 ML/MIN
GFR SERPL CREATININE-BSD FRML MDRD: ABNORMAL ML/MIN/{1.73_M2}
GFR SERPL CREATININE-BSD FRML MDRD: ABNORMAL ML/MIN/{1.73_M2}
GLUCOSE BLD-MCNC: 122 MG/DL (ref 70–99)
HCT VFR BLD CALC: 39.4 % (ref 36–46)
HDLC SERPL-MCNC: 65 MG/DL
HEMOGLOBIN: 12.7 G/DL (ref 12–16)
IMMATURE GRANULOCYTES: ABNORMAL %
LDL CHOLESTEROL: 91 MG/DL (ref 0–130)
LYMPHOCYTES # BLD: 43 % (ref 16–46)
MCH RBC QN AUTO: 29.6 PG (ref 26–34)
MCHC RBC AUTO-ENTMCNC: 32.2 G/DL (ref 31–37)
MCV RBC AUTO: 92.1 FL (ref 80–100)
MONOCYTES # BLD: 13 % (ref 4–11)
NRBC AUTOMATED: ABNORMAL PER 100 WBC
PDW BLD-RTO: 15.6 % (ref 11–14.5)
PLATELET # BLD: 271 K/UL (ref 140–450)
PLATELET ESTIMATE: ABNORMAL
PMV BLD AUTO: 9.1 FL (ref 6–12)
POTASSIUM SERPL-SCNC: 3.9 MMOL/L (ref 3.7–5.3)
RBC # BLD: 4.28 M/UL (ref 4–5.2)
RBC # BLD: ABNORMAL 10*6/UL
SEG NEUTROPHILS: 42 % (ref 43–77)
SEGMENTED NEUTROPHILS ABSOLUTE COUNT: 2.7 K/UL (ref 1.8–7.7)
SODIUM BLD-SCNC: 138 MMOL/L (ref 135–144)
TRIGL SERPL-MCNC: 131 MG/DL
VLDLC SERPL CALC-MCNC: NORMAL MG/DL (ref 1–30)
WBC # BLD: 6.5 K/UL (ref 3.5–11)
WBC # BLD: ABNORMAL 10*3/UL

## 2019-05-30 PROCEDURE — 20610 DRAIN/INJ JOINT/BURSA W/O US: CPT | Performed by: FAMILY MEDICINE

## 2019-05-30 PROCEDURE — G0438 PPPS, INITIAL VISIT: HCPCS | Performed by: FAMILY MEDICINE

## 2019-05-30 PROCEDURE — 1123F ACP DISCUSS/DSCN MKR DOCD: CPT | Performed by: FAMILY MEDICINE

## 2019-05-30 PROCEDURE — 1090F PRES/ABSN URINE INCON ASSESS: CPT | Performed by: FAMILY MEDICINE

## 2019-05-30 PROCEDURE — G0009 ADMIN PNEUMOCOCCAL VACCINE: HCPCS | Performed by: FAMILY MEDICINE

## 2019-05-30 PROCEDURE — 85025 COMPLETE CBC W/AUTO DIFF WBC: CPT

## 2019-05-30 PROCEDURE — 80061 LIPID PANEL: CPT

## 2019-05-30 PROCEDURE — 99214 OFFICE O/P EST MOD 30 MIN: CPT | Performed by: FAMILY MEDICINE

## 2019-05-30 PROCEDURE — G8427 DOCREV CUR MEDS BY ELIG CLIN: HCPCS | Performed by: FAMILY MEDICINE

## 2019-05-30 PROCEDURE — 90732 PPSV23 VACC 2 YRS+ SUBQ/IM: CPT | Performed by: FAMILY MEDICINE

## 2019-05-30 PROCEDURE — 36415 COLL VENOUS BLD VENIPUNCTURE: CPT

## 2019-05-30 PROCEDURE — 4040F PNEUMOC VAC/ADMIN/RCVD: CPT | Performed by: FAMILY MEDICINE

## 2019-05-30 PROCEDURE — G8420 CALC BMI NORM PARAMETERS: HCPCS | Performed by: FAMILY MEDICINE

## 2019-05-30 PROCEDURE — G8598 ASA/ANTIPLAT THER USED: HCPCS | Performed by: FAMILY MEDICINE

## 2019-05-30 PROCEDURE — 1036F TOBACCO NON-USER: CPT | Performed by: FAMILY MEDICINE

## 2019-05-30 PROCEDURE — 80048 BASIC METABOLIC PNL TOTAL CA: CPT

## 2019-05-30 RX ORDER — GABAPENTIN 300 MG/1
600 CAPSULE ORAL 2 TIMES DAILY
Qty: 360 CAPSULE | Refills: 0 | Status: SHIPPED | OUTPATIENT
Start: 2019-05-30 | End: 2020-01-15 | Stop reason: SDUPTHER

## 2019-05-30 RX ORDER — AMLODIPINE BESYLATE 10 MG/1
10 TABLET ORAL DAILY
Qty: 90 TABLET | Refills: 3 | Status: SHIPPED | OUTPATIENT
Start: 2019-05-30 | End: 2020-01-15

## 2019-05-30 RX ORDER — TRIAMCINOLONE ACETONIDE 40 MG/ML
40 INJECTION, SUSPENSION INTRA-ARTICULAR; INTRAMUSCULAR ONCE
Status: COMPLETED | OUTPATIENT
Start: 2019-05-30 | End: 2019-05-30

## 2019-05-30 RX ORDER — CARVEDILOL 3.12 MG/1
3.12 TABLET ORAL 2 TIMES DAILY
COMMUNITY
Start: 2018-08-27 | End: 2019-05-30 | Stop reason: SDUPTHER

## 2019-05-30 RX ORDER — CARVEDILOL 3.12 MG/1
3.12 TABLET ORAL 2 TIMES DAILY
Qty: 180 TABLET | Refills: 3 | Status: SHIPPED | OUTPATIENT
Start: 2019-05-30 | End: 2019-09-19 | Stop reason: SDUPTHER

## 2019-05-30 RX ORDER — ROPINIROLE 0.25 MG/1
0.25 TABLET, FILM COATED ORAL NIGHTLY
Qty: 30 TABLET | Refills: 3 | Status: SHIPPED | OUTPATIENT
Start: 2019-05-30 | End: 2019-09-10 | Stop reason: SDUPTHER

## 2019-05-30 RX ORDER — MIRTAZAPINE 15 MG/1
15 TABLET, FILM COATED ORAL NIGHTLY
Qty: 90 TABLET | Refills: 2 | Status: SHIPPED | OUTPATIENT
Start: 2019-05-30 | End: 2020-01-15 | Stop reason: SDUPTHER

## 2019-05-30 RX ORDER — OXYCODONE AND ACETAMINOPHEN 7.5; 325 MG/1; MG/1
1 TABLET ORAL EVERY 6 HOURS PRN
Qty: 120 TABLET | Refills: 0 | Status: SHIPPED | OUTPATIENT
Start: 2019-05-30 | End: 2019-05-30 | Stop reason: SDUPTHER

## 2019-05-30 RX ADMIN — TRIAMCINOLONE ACETONIDE 40 MG: 40 INJECTION, SUSPENSION INTRA-ARTICULAR; INTRAMUSCULAR at 11:18

## 2019-05-30 ASSESSMENT — ENCOUNTER SYMPTOMS
CONSTIPATION: 1
WHEEZING: 0
BACK PAIN: 1
DIARRHEA: 0
CHEST TIGHTNESS: 0
COUGH: 0
COLOR CHANGE: 0
SHORTNESS OF BREATH: 0
ABDOMINAL PAIN: 0

## 2019-05-30 ASSESSMENT — PATIENT HEALTH QUESTIONNAIRE - PHQ9
SUM OF ALL RESPONSES TO PHQ QUESTIONS 1-9: 0
SUM OF ALL RESPONSES TO PHQ QUESTIONS 1-9: 0

## 2019-05-30 ASSESSMENT — ANXIETY QUESTIONNAIRES: GAD7 TOTAL SCORE: 1

## 2019-05-30 ASSESSMENT — LIFESTYLE VARIABLES: HOW OFTEN DO YOU HAVE A DRINK CONTAINING ALCOHOL: 0

## 2019-05-30 NOTE — PROGRESS NOTES
strips (ASCENSIA AUTODISC VI;ONE TOUCH ULTRA TEST VI) strip 1 each by In Vitro route daily As needed. 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 No pertinent family history. CareTeam (Including outside providers/suppliers regularly involved in providing care):   Patient Care Team:  Aj Ma MD as PCP - General (Family Medicine)  Aj Ma MD as PCP - S Attributed Provider    Wt Readings from Last 3 Encounters:   05/30/19 104 lb (47.2 kg)   11/29/18 102 lb (46.3 kg)   09/07/18 107 lb (48.5 kg)     Vitals:    05/30/19 0850   BP: (!) 110/58   Site: Right Upper Arm   Position: Sitting   Cuff Size: Medium Adult   Pulse: 57   SpO2: 95%   Weight: 104 lb (47.2 kg)   Height: 5' 1.5\" (1.562 m)     Body mass index is 19.33 kg/m². Review of Systems   Constitutional: Negative for activity change, appetite change, chills, fatigue and fever. Eyes: Negative for visual disturbance. Respiratory: Negative for cough, chest tightness, shortness of breath and wheezing. Cardiovascular: Negative for chest pain, palpitations and leg swelling. Gastrointestinal: Positive for constipation. Negative for abdominal pain and diarrhea. Endocrine: Negative for polydipsia, polyphagia and polyuria. Genitourinary: Positive for frequency. Negative for difficulty urinating. Musculoskeletal: Positive for back pain. Negative for myalgias. She gets restless legs at night   Skin: Negative for rash.    Allergic/Immunologic: Negative for environmental allergies. Neurological: Negative for dizziness, syncope, weakness, light-headedness and headaches. Psychiatric/Behavioral: Positive for sleep disturbance (due to rls syndome). Negative for dysphoric mood. Based upon direct observation of the patient, evaluation of cognition reveals recent and remote memory intact. Patient's complete Health Risk Assessment and screening values have been reviewed and are found in Flowsheets. The following problems were reviewed today and where indicated follow up appointments were made and/or referrals ordered. Positive Risk Factor Screenings with Interventions:     General Health:  General  In the past 7 days, have you experienced any of the following? New or Increased Pain, New or Increased Fatigue, Loneliness, Social Isolation, Stress or Anger?: (!) Anger, Stress, New or Increased Pain  Do you get the social and emotional support that you need?: Yes  Do you have a Living Will?: Yes  General Health Risk Interventions:  · Pain issues: home exercises provided    Health Habits/Nutrition:  Health Habits/Nutrition  Do you exercise for at least 20 minutes 2-3 times per week?: Yes  Have you lost any weight without trying in the past 3 months?: No  Do you eat fewer than 2 meals per day?: No  Have you seen a dentist within the past year?: (!) No  Body mass index is 19.33 kg/m².   Health Habits/Nutrition Interventions:  · Dental exam overdue:  patient encouraged to make appointment with his/her dentist    Hearing/Vision:  Hearing/Vision  Do you or your family notice any trouble with your hearing?: (!) Yes(she wears hearing aids)  Do you have difficulty driving, watching TV, or doing any of your daily activities because of your eyesight?: (!) Yes(does still drives but has a little trouble with vision)  Have you had an eye exam within the past year?: Yes  Hearing/Vision Interventions:  · Hearing concerns:  patient declines any further evaluation/treatment for

## 2019-05-31 RX ORDER — OXYCODONE AND ACETAMINOPHEN 7.5; 325 MG/1; MG/1
1 TABLET ORAL EVERY 6 HOURS PRN
Qty: 120 TABLET | Refills: 0 | Status: SHIPPED | OUTPATIENT
Start: 2019-05-31 | End: 2019-07-09 | Stop reason: SDUPTHER

## 2019-05-31 RX ORDER — OXYCODONE AND ACETAMINOPHEN 7.5; 325 MG/1; MG/1
1 TABLET ORAL EVERY 6 HOURS PRN
Qty: 120 TABLET | Refills: 0 | Status: SHIPPED | OUTPATIENT
Start: 2019-05-31 | End: 2019-05-31 | Stop reason: SDUPTHER

## 2019-05-31 NOTE — PROGRESS NOTES
[address] [facility]  Dept: [phone]  Dept Fax: [phone]  Loc: [phone]    Giovanna Jameson is a 80 y.o. female who presents today for her medical conditions/complaints as noted below. Giovanna Jameson is c/o of   Chief Complaint   Patient presents with    Medicare AWV    Leg Pain     increased pain in bilateral legs    Medication Refill     leaving the [place] and needs her meds filled before cause she leaving for vacation        HPI:     HPI Here today for her AWV and a follow up of her back pain, shoulder pain and she is having rls. Back pain: stable; she is still doing well with her percocet. She is frustrated because she wishes she did not have to take it, but it is the only thing that works to control her pain. She is taking it 4 times a day. She is having some constipation from it that she is using miralax to treat. She is only taking her miralax prn though so she is finding that it is not working very well. RLS: new; she is having a lot of problems with restlessness in her legs at night. It is the worst when she is laying down. She can't get comfortable so she paces the floor until her legs feel better. Once her legs calm down she is able to sleep. She also has found that stretching can help her symptoms as well. She has not noticed if changing textures can help her at all. Shoulder pain: worsening; I gave her a steroid injection in her shoulder about 6 months ago and it really helped with her pain. Her ROM is terrible again and she is having pain in her shoulder so she would like another injection. She has not noticed significant weakness in the arm. No numbness or tingling in the arm. Movement makes her symptoms worse. She has not found anything to substantially improve her symptoms.      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     L4 & L5    [facility]     skin cancers    Depression     History of heart artery stent     Hyperlipidemia     Hypertension     Nerve pain           Social History     Tobacco Use    Smoking status: Never Smoker    Smokeless tobacco: Never Used   Substance Use Topics    Alcohol use: No     Current Outpatient Medications   Medication Sig Dispense Refill    amLODIPine (NORVASC) 10 MG tablet Take 1 tablet by mouth daily 90 tablet 3    carvedilol (COREG) 3.125 MG tablet Take 1 tablet by mouth 2 times daily 180 tablet 3    gabapentin (NEURONTIN) 300 MG capsule Take 2 capsules by mouth 2 times daily for 90 days. Indications: pt takes 2 300mg caps Twice daily 360 capsule 0    mirtazapine (REMERON) 15 MG tablet Take 1 tablet by mouth nightly 90 tablet 2    oxyCODONE-acetaminophen (PERCOCET) 7.5-325 MG per tablet Take 1 tablet by mouth every 6 hours as needed for Pain for up to 30 days. Intended supply: 30 days 120 tablet 0    rOPINIRole (REQUIP) 0.25 MG tablet Take 1 tablet by mouth nightly 30 tablet 3    ezetimibe (ZETIA) 10 MG tablet TAKE 1 TABLET DAILY 90 tablet 2    hydrALAZINE (APRESOLINE) 25 MG tablet Take 1 tablet by mouth 2 times daily 180 tablet 2    hydrochlorothiazide (HYDRODIURIL) 25 MG tablet TAKE 1 TABLET DAILY 90 tablet 3    Lancets MISC Test sugars once daily. R73.03 100 each 1    glucose blood VI test strips (ASCENSIA AUTODISC VI;ONE TOUCH ULTRA TEST VI) strip 1 each by In Vitro route daily As needed. 100 each 1    aspirin 81 MG chewable tablet Take 81 mg by mouth       No current facility-administered medications for this visit. Allergies   Allergen Reactions    Atorvastatin      Other reaction(s): Muscle Pain    Codeine     Diltiazem Hcl Hives    Levofloxacin      Other reaction(s):  Intolerance-unknown    Pregabalin      lyrica    Simvastatin      Other reaction(s): Muscle Pain       Subjective:     Review of Systems   Constitutional: Negative for activity change, appetite change, chills, fatigue and fever. Eyes: Negative for visual disturbance. Respiratory: Negative for cough, chest tightness, shortness of breath and wheezing. Cardiovascular: Negative for chest pain, palpitations and leg swelling. Gastrointestinal: Positive for constipation. Negative for abdominal pain and diarrhea. Genitourinary: Positive for frequency. Negative for difficulty urinating. Musculoskeletal: Positive for back pain. Skin: Negative for color change and rash. Neurological: Negative for dizziness, syncope, weakness and light-headedness. Psychiatric/Behavioral: Positive for sleep disturbance (due to RLS). Objective:      Physical Exam   Constitutional: She is oriented to person, place, and time. She appears well-developed and well-nourished. No distress. Eyes: Conjunctivae are normal.   Neck: Normal range of motion. Neck supple. No thyromegaly present. Cardiovascular: Normal rate, regular rhythm, normal heart sounds and intact distal pulses. No murmur heard. Pulmonary/Chest: Effort normal and breath sounds normal. No respiratory distress. She has no wheezes. Musculoskeletal: She exhibits no edema. Right shoulder: She exhibits decreased range of motion (very limited abduction), tenderness, bony tenderness and crepitus. She exhibits no spasm and normal strength. Lumbar back: She exhibits decreased range of motion and spasm. She exhibits no tenderness and no bony tenderness. Lymphadenopathy:     She has no cervical adenopathy. Neurological: She is alert and oriented to person, place, and time. Skin: Skin is warm and dry. No rash noted. No erythema. Nursing note and vitals reviewed. BP (!) 110/58 (Site: Right Upper Arm, Position: Sitting, Cuff Size: Medium Adult)   Pulse 57   Ht 5' 1.5\" (1.562 m)   Wt 104 lb (47.2 kg)   SpO2 95%   BMI 19.33 kg/m²     Assessment:       Diagnosis Orders   1.  Routine general medical examination at a [facility] facility     2. RLS (restless legs syndrome)  CBC Auto Differential   3. Chronic right shoulder pain  PA ARTHROCENTESIS ASPIR&/INJ MAJOR JT/BURSA W/O US    triamcinolone acetonide (KENALOG-40) injection 40 mg   4. Chronic midline low back pain without sciatica  oxyCODONE-acetaminophen (PERCOCET) 7.5-325 MG per tablet    Basic Metabolic Panel   5. Elevated triglycerides with high cholesterol  Lipid Panel             Plan:        MWV: see other note    RLS: new; she is very bothered by this so I recommended some calf stretches before bed and I started her on requip. Hopefully this will help to improve her symptoms. Shoulder pain: worsening; she requested an injection so that was done. Procedure Note    PREOP DIAGNOSIS:  [x] Right []  Left    [] Bilateral Shoulder Pain    POSTOP DIAGNOSIS:  [x] Right []  Left    [] Bilateral Shoulder Pain    OPERATION:  [x] Right []  Left    [] Bilateral INTRA-ARTICULAR Glenohumeral Injection    PROCEDURE:  After sterile prep, a posterior approach was used. 40 mg of Kenalog and 2 mL of 2% lidocaine without epi injected intra-articularly without incident. Pre- and post neurovascular status verified. No complications noted. Back pain: stable; she is doing well on the percocet so I will continue it. She was given a 3 month's supply. Controlled Substances Monitoring:     RX Monitoring 5/30/2019   Attestation The Prescription Monitoring Report for this patient was reviewed today. Chronic Pain Routine Monitoring Possible medication side effects, risk of tolerance/dependence & alternative treatments discussed. ;Obtaining appropriate analgesic effect of treatment. Chronic Pain > 50 MEDD Re-evaluated the status of the patient's underlying condition causing pain.;Obtained or confirmened \"Consent of Opioid Use\" on file. Chronic Pain > 80 MEDD Obtained or confirmed a written medication contract was on file. Health Maintenance reviewed - Pneumovax given.       Return in about 3 months (around 8/30/2019) for Back pain follow up. Orders Placed This Encounter   Procedures    PNEUMOVAX 23 subcutaneous/IM (Pneumococcal polysaccharide vaccine 23-valent >= 3yo)    Basic Metabolic Panel     Standing Status:   Future     Number of Occurrences:   1     Standing Expiration Date:   5/30/2020    Lipid Panel     Standing Status:   Future     Number of Occurrences:   1     Standing Expiration Date:   5/30/2020     Order Specific Question:   Is Patient Fasting?/# of Hours     Answer:   non fasting per Dr Enrique Zendejas    CBC Auto Differential     Standing Status:   Future     Number of Occurrences:   1     Standing Expiration Date:   5/29/2020    [place] ARTHROCENTESIS ASPIR&/INJ MAJOR JT/BURSA W/O US     Orders Placed This Encounter   Medications    amLODIPine (NORVASC) 10 MG tablet     Sig: Take 1 tablet by mouth daily     Dispense:  90 tablet     Refill:  3    carvedilol (COREG) 3.125 MG tablet     Sig: Take 1 tablet by mouth 2 times daily     Dispense:  180 tablet     Refill:  3    gabapentin (NEURONTIN) 300 MG capsule     Sig: Take 2 capsules by mouth 2 times daily for 90 days. Indications: pt takes 2 300mg caps Twice daily     Dispense:  360 capsule     Refill:  0    mirtazapine (REMERON) 15 MG tablet     Sig: Take 1 tablet by mouth nightly     Dispense:  90 tablet     Refill:  2    oxyCODONE-acetaminophen (PERCOCET) 7.5-325 MG per tablet     Sig: Take 1 tablet by mouth every 6 hours as needed for Pain for up to 30 days. Intended supply: 30 days     Dispense:  120 tablet     Refill:  0     Do not fill until 6/8/19    triamcinolone acetonide (KENALOG-40) injection 40 mg    rOPINIRole (REQUIP) 0.25 MG tablet     Sig: Take 1 tablet by mouth nightly     Dispense:  30 tablet     Refill:  3       Patientgiven educational materials - see patient instructions. Discussed use, benefit,and side effects of prescribed medications. All patient questions answered. Ptvoiced understanding.  Reviewed health maintenance. Instructed to continue currentmedications, diet and exercise. Patient agreed with treatment plan. Follow up asdirected.      Electronically signed by Diego Caruso MD on 5/30/2019 at 9:20 PM

## 2019-06-10 RX ORDER — MIRTAZAPINE 15 MG/1
TABLET, FILM COATED ORAL
Qty: 15 TABLET | Refills: 3 | OUTPATIENT
Start: 2019-06-10

## 2019-07-09 DIAGNOSIS — M54.50 CHRONIC MIDLINE LOW BACK PAIN WITHOUT SCIATICA: ICD-10-CM

## 2019-07-09 DIAGNOSIS — G89.29 CHRONIC MIDLINE LOW BACK PAIN WITHOUT SCIATICA: ICD-10-CM

## 2019-07-09 RX ORDER — OXYCODONE AND ACETAMINOPHEN 7.5; 325 MG/1; MG/1
1 TABLET ORAL EVERY 6 HOURS PRN
Qty: 120 TABLET | Refills: 0 | Status: SHIPPED | OUTPATIENT
Start: 2019-07-09 | End: 2019-08-08 | Stop reason: SDUPTHER

## 2019-07-09 NOTE — TELEPHONE ENCOUNTER
Last Appt:  5/30/2019  Next Appt:   9/10/2019  Med verified in [address] 7/9/19      oarrs verified  Oxycodon-Acetaminophen 7.5-325  Written: 05/30/2019   Filled: 06/08/2019  120/30

## 2019-08-08 DIAGNOSIS — G89.29 CHRONIC MIDLINE LOW BACK PAIN WITHOUT SCIATICA: ICD-10-CM

## 2019-08-08 DIAGNOSIS — M54.50 CHRONIC MIDLINE LOW BACK PAIN WITHOUT SCIATICA: ICD-10-CM

## 2019-08-08 RX ORDER — OXYCODONE AND ACETAMINOPHEN 7.5; 325 MG/1; MG/1
1 TABLET ORAL EVERY 6 HOURS PRN
Qty: 120 TABLET | Refills: 0 | Status: SHIPPED | OUTPATIENT
Start: 2019-08-08 | End: 2019-09-06 | Stop reason: SDUPTHER

## 2019-09-06 DIAGNOSIS — G89.29 CHRONIC MIDLINE LOW BACK PAIN WITHOUT SCIATICA: ICD-10-CM

## 2019-09-06 DIAGNOSIS — M54.50 CHRONIC MIDLINE LOW BACK PAIN WITHOUT SCIATICA: ICD-10-CM

## 2019-09-06 RX ORDER — OXYCODONE AND ACETAMINOPHEN 7.5; 325 MG/1; MG/1
1 TABLET ORAL EVERY 6 HOURS PRN
Qty: 120 TABLET | Refills: 0 | Status: SHIPPED | OUTPATIENT
Start: 2019-09-06 | End: 2019-09-10 | Stop reason: SDUPTHER

## 2019-09-10 ENCOUNTER — TELEPHONE (OUTPATIENT)
Dept: FAMILY MEDICINE CLINIC | Age: 84
End: 2019-09-10

## 2019-09-10 ENCOUNTER — OFFICE VISIT (OUTPATIENT)
Dept: FAMILY MEDICINE CLINIC | Age: 84
End: 2019-09-10
Payer: MEDICARE

## 2019-09-10 VITALS
SYSTOLIC BLOOD PRESSURE: 118 MMHG | DIASTOLIC BLOOD PRESSURE: 56 MMHG | HEART RATE: 60 BPM | WEIGHT: 101.8 LBS | HEIGHT: 61 IN | BODY MASS INDEX: 19.22 KG/M2 | OXYGEN SATURATION: 94 %

## 2019-09-10 DIAGNOSIS — M54.50 CHRONIC MIDLINE LOW BACK PAIN WITHOUT SCIATICA: ICD-10-CM

## 2019-09-10 DIAGNOSIS — G89.29 CHRONIC MIDLINE LOW BACK PAIN WITHOUT SCIATICA: ICD-10-CM

## 2019-09-10 DIAGNOSIS — M25.511 CHRONIC RIGHT SHOULDER PAIN: Primary | ICD-10-CM

## 2019-09-10 DIAGNOSIS — G89.29 CHRONIC RIGHT SHOULDER PAIN: Primary | ICD-10-CM

## 2019-09-10 PROCEDURE — G8427 DOCREV CUR MEDS BY ELIG CLIN: HCPCS | Performed by: FAMILY MEDICINE

## 2019-09-10 PROCEDURE — G8598 ASA/ANTIPLAT THER USED: HCPCS | Performed by: FAMILY MEDICINE

## 2019-09-10 PROCEDURE — 1036F TOBACCO NON-USER: CPT | Performed by: FAMILY MEDICINE

## 2019-09-10 PROCEDURE — 1090F PRES/ABSN URINE INCON ASSESS: CPT | Performed by: FAMILY MEDICINE

## 2019-09-10 PROCEDURE — 20610 DRAIN/INJ JOINT/BURSA W/O US: CPT | Performed by: FAMILY MEDICINE

## 2019-09-10 PROCEDURE — 99213 OFFICE O/P EST LOW 20 MIN: CPT | Performed by: FAMILY MEDICINE

## 2019-09-10 PROCEDURE — 1123F ACP DISCUSS/DSCN MKR DOCD: CPT | Performed by: FAMILY MEDICINE

## 2019-09-10 PROCEDURE — 4040F PNEUMOC VAC/ADMIN/RCVD: CPT | Performed by: FAMILY MEDICINE

## 2019-09-10 PROCEDURE — G8420 CALC BMI NORM PARAMETERS: HCPCS | Performed by: FAMILY MEDICINE

## 2019-09-10 PROCEDURE — G0008 ADMIN INFLUENZA VIRUS VAC: HCPCS | Performed by: FAMILY MEDICINE

## 2019-09-10 PROCEDURE — 90653 IIV ADJUVANT VACCINE IM: CPT | Performed by: FAMILY MEDICINE

## 2019-09-10 RX ORDER — OXYCODONE AND ACETAMINOPHEN 7.5; 325 MG/1; MG/1
1 TABLET ORAL EVERY 6 HOURS PRN
Qty: 120 TABLET | Refills: 0 | Status: SHIPPED | OUTPATIENT
Start: 2019-09-10 | End: 2019-09-10 | Stop reason: SDUPTHER

## 2019-09-10 RX ORDER — ROPINIROLE 0.25 MG/1
0.25 TABLET, FILM COATED ORAL NIGHTLY
Qty: 90 TABLET | Refills: 3 | Status: SHIPPED | OUTPATIENT
Start: 2019-09-10 | End: 2019-09-18

## 2019-09-10 RX ORDER — OXYCODONE AND ACETAMINOPHEN 7.5; 325 MG/1; MG/1
1 TABLET ORAL EVERY 6 HOURS PRN
Qty: 120 TABLET | Refills: 0 | Status: SHIPPED | OUTPATIENT
Start: 2019-09-10 | End: 2020-01-03 | Stop reason: SDUPTHER

## 2019-09-10 RX ORDER — TRIAMCINOLONE ACETONIDE 40 MG/ML
40 INJECTION, SUSPENSION INTRA-ARTICULAR; INTRAMUSCULAR ONCE
Status: COMPLETED | OUTPATIENT
Start: 2019-09-10 | End: 2019-09-10

## 2019-09-10 RX ADMIN — TRIAMCINOLONE ACETONIDE 40 MG: 40 INJECTION, SUSPENSION INTRA-ARTICULAR; INTRAMUSCULAR at 09:03

## 2019-09-10 ASSESSMENT — ENCOUNTER SYMPTOMS
DIARRHEA: 0
SHORTNESS OF BREATH: 0
COLOR CHANGE: 0
CONSTIPATION: 1
WHEEZING: 0
CHEST TIGHTNESS: 0
ABDOMINAL PAIN: 0
COUGH: 0
BACK PAIN: 1

## 2019-09-10 NOTE — PROGRESS NOTES
Have you had an allergic reaction to the flu (influenza) shot? no  Are you allergic to eggs or any component of the flu vaccine? no  Do you have a history of Guillain-Gadsden Syndrome (GBS), which is paralysis after receiving the flu vaccine? no  Are you feeling well today? yes  Flu vaccine given as ordered. Patient tolerated it well. No questions re: VIS information.

## 2019-09-11 DIAGNOSIS — I10 ESSENTIAL HYPERTENSION: ICD-10-CM

## 2019-09-11 RX ORDER — HYDRALAZINE HYDROCHLORIDE 25 MG/1
25 TABLET, FILM COATED ORAL 2 TIMES DAILY
Qty: 180 TABLET | Refills: 2 | OUTPATIENT
Start: 2019-09-11

## 2019-09-11 NOTE — TELEPHONE ENCOUNTER
Lm for Minerva Dewayne to let her know that her insurance won't pay for 90 day for this medication but she is able to pay 8.00 for a 90 day supply if she would like -

## 2019-09-18 ENCOUNTER — OFFICE VISIT (OUTPATIENT)
Dept: PRIMARY CARE CLINIC | Age: 84
End: 2019-09-18
Payer: MEDICARE

## 2019-09-18 ENCOUNTER — HOSPITAL ENCOUNTER (EMERGENCY)
Age: 84
Discharge: HOME | End: 2019-09-18
Attending: EMERGENCY MEDICINE
Payer: MEDICARE

## 2019-09-18 ENCOUNTER — APPOINTMENT (OUTPATIENT)
Dept: GENERAL RADIOLOGY | Age: 84
End: 2019-09-18
Payer: MEDICARE

## 2019-09-18 VITALS
TEMPERATURE: 97.2 F | HEIGHT: 61 IN | HEART RATE: 66 BPM | SYSTOLIC BLOOD PRESSURE: 173 MMHG | DIASTOLIC BLOOD PRESSURE: 69 MMHG | RESPIRATION RATE: 15 BRPM | WEIGHT: 101 LBS | BODY MASS INDEX: 19.07 KG/M2 | OXYGEN SATURATION: 96 %

## 2019-09-18 VITALS
BODY MASS INDEX: 18.65 KG/M2 | OXYGEN SATURATION: 98 % | HEART RATE: 87 BPM | WEIGHT: 98.8 LBS | TEMPERATURE: 97.3 F | HEIGHT: 61 IN | DIASTOLIC BLOOD PRESSURE: 78 MMHG | SYSTOLIC BLOOD PRESSURE: 240 MMHG | RESPIRATION RATE: 18 BRPM

## 2019-09-18 DIAGNOSIS — I10 HYPERTENSION, UNSPECIFIED TYPE: Primary | ICD-10-CM

## 2019-09-18 DIAGNOSIS — R73.9 HIGH BLOOD SUGAR: ICD-10-CM

## 2019-09-18 DIAGNOSIS — I16.0 HYPERTENSIVE URGENCY: Primary | ICD-10-CM

## 2019-09-18 LAB
ABSOLUTE EOS #: 0 K/UL (ref 0–0.4)
ABSOLUTE IMMATURE GRANULOCYTE: ABNORMAL K/UL (ref 0–0.3)
ABSOLUTE LYMPH #: 1.9 K/UL (ref 1–4.8)
ABSOLUTE MONO #: 0.9 K/UL (ref 0.1–1.2)
ANION GAP SERPL CALCULATED.3IONS-SCNC: 16 MMOL/L (ref 9–17)
BASOPHILS # BLD: 1 % (ref 0–1)
BASOPHILS ABSOLUTE: 0 K/UL (ref 0–0.2)
BUN BLDV-MCNC: 12 MG/DL (ref 8–23)
BUN/CREAT BLD: 20 (ref 9–20)
CALCIUM SERPL-MCNC: 9.3 MG/DL (ref 8.6–10.4)
CHLORIDE BLD-SCNC: 84 MMOL/L (ref 98–107)
CHP ED QC CHECK: ABNORMAL
CO2: 27 MMOL/L (ref 20–31)
CREAT SERPL-MCNC: 0.61 MG/DL (ref 0.5–0.9)
DIFFERENTIAL TYPE: ABNORMAL
EKG ATRIAL RATE: 89 BPM
EKG P AXIS: 75 DEGREES
EKG P-R INTERVAL: 154 MS
EKG Q-T INTERVAL: 410 MS
EKG QRS DURATION: 128 MS
EKG QTC CALCULATION (BAZETT): 498 MS
EKG R AXIS: 116 DEGREES
EKG T AXIS: 8 DEGREES
EKG VENTRICULAR RATE: 89 BPM
EOSINOPHILS RELATIVE PERCENT: 1 % (ref 1–7)
GFR AFRICAN AMERICAN: >60 ML/MIN
GFR NON-AFRICAN AMERICAN: >60 ML/MIN
GFR SERPL CREATININE-BSD FRML MDRD: ABNORMAL ML/MIN/{1.73_M2}
GFR SERPL CREATININE-BSD FRML MDRD: ABNORMAL ML/MIN/{1.73_M2}
GLUCOSE BLD-MCNC: 145 MG/DL
GLUCOSE BLD-MCNC: 159 MG/DL (ref 70–99)
HCT VFR BLD CALC: 37.9 % (ref 36–46)
HEMOGLOBIN: 13 G/DL (ref 12–16)
IMMATURE GRANULOCYTES: ABNORMAL %
LYMPHOCYTES # BLD: 24 % (ref 16–46)
MCH RBC QN AUTO: 31.1 PG (ref 26–34)
MCHC RBC AUTO-ENTMCNC: 34.3 G/DL (ref 31–37)
MCV RBC AUTO: 90.6 FL (ref 80–100)
MONOCYTES # BLD: 12 % (ref 4–11)
NRBC AUTOMATED: ABNORMAL PER 100 WBC
PDW BLD-RTO: 13.9 % (ref 11–14.5)
PLATELET # BLD: 258 K/UL (ref 140–450)
PLATELET ESTIMATE: ABNORMAL
PMV BLD AUTO: 8.7 FL (ref 6–12)
POTASSIUM SERPL-SCNC: 3.3 MMOL/L (ref 3.7–5.3)
RBC # BLD: 4.18 M/UL (ref 4–5.2)
RBC # BLD: ABNORMAL 10*6/UL
SEG NEUTROPHILS: 62 % (ref 43–77)
SEGMENTED NEUTROPHILS ABSOLUTE COUNT: 4.9 K/UL (ref 1.8–7.7)
SODIUM BLD-SCNC: 127 MMOL/L (ref 135–144)
TROPONIN INTERP: ABNORMAL
TROPONIN T: ABNORMAL NG/ML
TROPONIN, HIGH SENSITIVITY: 18 NG/L (ref 0–14)
WBC # BLD: 7.8 K/UL (ref 3.5–11)
WBC # BLD: ABNORMAL 10*3/UL

## 2019-09-18 PROCEDURE — 96375 TX/PRO/DX INJ NEW DRUG ADDON: CPT

## 2019-09-18 PROCEDURE — 2500000003 HC RX 250 WO HCPCS: Performed by: EMERGENCY MEDICINE

## 2019-09-18 PROCEDURE — 82962 GLUCOSE BLOOD TEST: CPT | Performed by: NURSE PRACTITIONER

## 2019-09-18 PROCEDURE — 85025 COMPLETE CBC W/AUTO DIFF WBC: CPT

## 2019-09-18 PROCEDURE — 99284 EMERGENCY DEPT VISIT MOD MDM: CPT

## 2019-09-18 PROCEDURE — 71045 X-RAY EXAM CHEST 1 VIEW: CPT

## 2019-09-18 PROCEDURE — 96374 THER/PROPH/DIAG INJ IV PUSH: CPT

## 2019-09-18 PROCEDURE — 93005 ELECTROCARDIOGRAM TRACING: CPT | Performed by: EMERGENCY MEDICINE

## 2019-09-18 PROCEDURE — 80048 BASIC METABOLIC PNL TOTAL CA: CPT

## 2019-09-18 PROCEDURE — 84484 ASSAY OF TROPONIN QUANT: CPT

## 2019-09-18 RX ORDER — ENALAPRILAT 2.5 MG/2ML
1.25 INJECTION INTRAVENOUS ONCE
Status: COMPLETED | OUTPATIENT
Start: 2019-09-18 | End: 2019-09-18

## 2019-09-18 RX ORDER — LABETALOL HYDROCHLORIDE 5 MG/ML
20 INJECTION, SOLUTION INTRAVENOUS ONCE
Status: COMPLETED | OUTPATIENT
Start: 2019-09-18 | End: 2019-09-18

## 2019-09-18 RX ADMIN — ENALAPRILAT 1.25 MG: 1.25 INJECTION INTRAVENOUS at 17:45

## 2019-09-18 RX ADMIN — LABETALOL HYDROCHLORIDE 20 MG: 5 INJECTION, SOLUTION INTRAVENOUS at 17:51

## 2019-09-18 ASSESSMENT — ENCOUNTER SYMPTOMS
CHEST TIGHTNESS: 0
WHEEZING: 0
BLURRED VISION: 1
SHORTNESS OF BREATH: 1

## 2019-09-18 NOTE — ED PROVIDER NOTES
[facility] ED  [address]  [place] [address]  Phone: [phone]    Pt Name: Michelle Campbell  MRN: [id number]  Sumeetgflaury 8/16/1928  Date of evaluation: 9/18/2019      CHIEF COMPLAINT       Chief Complaint   Patient presents with    Hypertension     sent from clinic for HTN - per ptshe has been monitoring it for about a week and it has been high- pt actually monitors her B/P and it has been high for awhile but she went in today because she has been light headed         HISTORY OF PRESENT ILLNESS     Michelle Campbell is a 80 y.o. female who presents with elevated blood pressure of 220/90 and came down from urgent care and blood pressures been elevated at home over the past 2 weeks or so. She has not changed her diet that she does eat a fair amount of salt. She has had some lightheadedness at home. Vital signs are normal.  She has no chest pain cough shortness of a production hemoptysis. She has no other HEENT complaints. No headache and she has no confusion. She is pleasant and able to give history without difficulty. She has no neurologic deficits and no abdominal symptoms or extremity complaints. 20 years ago she had a coronary artery stent and has not had any further problems with that. Is brought in by her family. She's been taking her blood pressure medication as directed. She has borderline diabetes hypertension hyperlipidemia but is never been a smoker. REVIEW OF SYSTEMS         REVIEW OF SYSTEMS    Constitutional:  As above  Eyes:  Denies vision changes  HEENT:  Denies sore throat or nasal congestion  Respiratory:  Denies cough or shortness of breath   Cardiovascular:  Denies chest pain  GI:  Denies abdominal pain, nausea, vomiting, or diarrhea   Musculoskeletal:  Denies back pain   Skin:  No rash on exposed surfaces   Neurologic:  Normal baseline mentation. No new deficits. Lymphatic:   No nodes or infection  Psychiatric:  No psychosis.  Alert and interacting mg/dL    BUN 12 8 - 23 mg/dL    CREATININE 0.61 0.50 - 0.90 mg/dL    Bun/Cre Ratio 20 9 - 20    Calcium 9.3 8.6 - 10.4 mg/dL    Sodium 127 (L) 135 - 144 mmol/L    Potassium 3.3 (L) 3.7 - 5.3 mmol/L    Chloride 84 (L) 98 - 107 mmol/L    CO2 27 20 - 31 mmol/L    Anion Gap 16 9 - 17 mmol/L    GFR Non-African American >60 >60 mL/min    GFR African American >60 >60 mL/min    GFR Comment          GFR Staging NOT REPORTED    Troponin   Result Value Ref Range    Troponin, High Sensitivity 18 (H) 0 - 14 ng/L    Troponin T NOT REPORTED <0.03 ng/mL    Troponin Interp NOT REPORTED    CBC Auto Differential   Result Value Ref Range    WBC 7.8 3.5 - 11.0 k/uL    RBC 4.18 4.0 - 5.2 m/uL    Hemoglobin 13.0 12.0 - 16.0 g/dL    Hematocrit 37.9 36 - 46 %    MCV 90.6 80 - 100 fL    MCH 31.1 26 - 34 pg    MCHC 34.3 31 - 37 g/dL    RDW 13.9 11.0 - 14.5 %    Platelets 957 537 - 979 k/uL    MPV 8.7 6.0 - 12.0 fL    NRBC Automated NOT REPORTED per 100 WBC    Differential Type NOT REPORTED     Immature Granulocytes NOT REPORTED 0 %    Absolute Immature Granulocyte NOT REPORTED 0.00 - 0.30 k/uL    WBC Morphology NOT REPORTED     RBC Morphology NOT REPORTED     Platelet Estimate NOT REPORTED     Seg Neutrophils 62 43 - 77 %    Lymphocytes 24 16 - 46 %    Monocytes 12 (H) 4 - 11 %    Eosinophils % 1 1 - 7 %    Basophils 1 0 - 1 %    Segs Absolute 4.90 1.8 - 7.7 k/uL    Absolute Lymph # 1.90 1.0 - 4.8 k/uL    Absolute Mono # 0.90 0.1 - 1.2 k/uL    Absolute Eos # 0.00 0.0 - 0.4 k/uL    Basophils Absolute 0.00 0.0 - 0.2 k/uL   EKG 12 Lead   Result Value Ref Range    Ventricular Rate 89 BPM    Atrial Rate 89 BPM    P-R Interval 154 ms    QRS Duration 128 ms    Q-T Interval 410 ms    QTc Calculation (Bazett) 498 ms    P Axis 75 degrees    R Axis 116 degrees    T Axis 8 degrees       ABNORMAL LABS:  Labs Reviewed   BASIC METABOLIC PANEL - Abnormal; Notable for the following components:       Result Value    Glucose 159 (*)     Sodium 127 (*)

## 2019-09-19 ENCOUNTER — OFFICE VISIT (OUTPATIENT)
Dept: FAMILY MEDICINE CLINIC | Age: 84
End: 2019-09-19
Payer: MEDICARE

## 2019-09-19 VITALS
OXYGEN SATURATION: 96 % | SYSTOLIC BLOOD PRESSURE: 150 MMHG | DIASTOLIC BLOOD PRESSURE: 54 MMHG | WEIGHT: 101 LBS | HEART RATE: 61 BPM | HEIGHT: 61 IN | BODY MASS INDEX: 19.07 KG/M2

## 2019-09-19 DIAGNOSIS — I10 ESSENTIAL HYPERTENSION: Primary | ICD-10-CM

## 2019-09-19 PROCEDURE — 1090F PRES/ABSN URINE INCON ASSESS: CPT | Performed by: FAMILY MEDICINE

## 2019-09-19 PROCEDURE — G8427 DOCREV CUR MEDS BY ELIG CLIN: HCPCS | Performed by: FAMILY MEDICINE

## 2019-09-19 PROCEDURE — 4040F PNEUMOC VAC/ADMIN/RCVD: CPT | Performed by: FAMILY MEDICINE

## 2019-09-19 PROCEDURE — G8420 CALC BMI NORM PARAMETERS: HCPCS | Performed by: FAMILY MEDICINE

## 2019-09-19 PROCEDURE — G8598 ASA/ANTIPLAT THER USED: HCPCS | Performed by: FAMILY MEDICINE

## 2019-09-19 PROCEDURE — 99214 OFFICE O/P EST MOD 30 MIN: CPT | Performed by: FAMILY MEDICINE

## 2019-09-19 PROCEDURE — 1123F ACP DISCUSS/DSCN MKR DOCD: CPT | Performed by: FAMILY MEDICINE

## 2019-09-19 PROCEDURE — 1036F TOBACCO NON-USER: CPT | Performed by: FAMILY MEDICINE

## 2019-09-19 RX ORDER — CARVEDILOL 3.12 MG/1
6.25 TABLET ORAL 2 TIMES DAILY
Qty: 180 TABLET | Refills: 3
Start: 2019-09-19 | End: 2019-10-25 | Stop reason: SDUPTHER

## 2019-09-19 ASSESSMENT — ENCOUNTER SYMPTOMS
COUGH: 0
SHORTNESS OF BREATH: 0
CHEST TIGHTNESS: 0
WHEEZING: 0

## 2019-09-23 ENCOUNTER — TELEPHONE (OUTPATIENT)
Dept: FAMILY MEDICINE CLINIC | Age: 84
End: 2019-09-23

## 2019-09-23 ENCOUNTER — OFFICE VISIT (OUTPATIENT)
Dept: FAMILY MEDICINE CLINIC | Age: 84
End: 2019-09-23
Payer: MEDICARE

## 2019-09-23 VITALS
OXYGEN SATURATION: 97 % | BODY MASS INDEX: 18.7 KG/M2 | SYSTOLIC BLOOD PRESSURE: 170 MMHG | HEART RATE: 66 BPM | DIASTOLIC BLOOD PRESSURE: 58 MMHG | HEIGHT: 61 IN | WEIGHT: 99.04 LBS

## 2019-09-23 DIAGNOSIS — I10 ESSENTIAL HYPERTENSION: Primary | ICD-10-CM

## 2019-09-23 PROCEDURE — G8427 DOCREV CUR MEDS BY ELIG CLIN: HCPCS | Performed by: FAMILY MEDICINE

## 2019-09-23 PROCEDURE — 99214 OFFICE O/P EST MOD 30 MIN: CPT | Performed by: FAMILY MEDICINE

## 2019-09-23 PROCEDURE — 1123F ACP DISCUSS/DSCN MKR DOCD: CPT | Performed by: FAMILY MEDICINE

## 2019-09-23 PROCEDURE — 1090F PRES/ABSN URINE INCON ASSESS: CPT | Performed by: FAMILY MEDICINE

## 2019-09-23 PROCEDURE — 1036F TOBACCO NON-USER: CPT | Performed by: FAMILY MEDICINE

## 2019-09-23 PROCEDURE — G8420 CALC BMI NORM PARAMETERS: HCPCS | Performed by: FAMILY MEDICINE

## 2019-09-23 PROCEDURE — 4040F PNEUMOC VAC/ADMIN/RCVD: CPT | Performed by: FAMILY MEDICINE

## 2019-09-23 PROCEDURE — G8598 ASA/ANTIPLAT THER USED: HCPCS | Performed by: FAMILY MEDICINE

## 2019-09-23 RX ORDER — LISINOPRIL 20 MG/1
20 TABLET ORAL DAILY
Qty: 30 TABLET | Refills: 1 | Status: SHIPPED | OUTPATIENT
Start: 2019-09-23 | End: 2019-11-14 | Stop reason: SDUPTHER

## 2019-09-23 RX ORDER — ROPINIROLE 0.25 MG/1
TABLET, FILM COATED ORAL
Refills: 3 | COMMUNITY
Start: 2019-09-19 | End: 2020-01-15 | Stop reason: SDUPTHER

## 2019-09-23 RX ORDER — METOPROLOL TARTRATE 50 MG/1
TABLET, FILM COATED ORAL
COMMUNITY
End: 2019-09-23

## 2019-09-23 ASSESSMENT — ENCOUNTER SYMPTOMS
WHEEZING: 0
CHEST TIGHTNESS: 0
COUGH: 0
SHORTNESS OF BREATH: 0

## 2019-10-09 RX ORDER — OXYCODONE AND ACETAMINOPHEN 7.5; 325 MG/1; MG/1
1 TABLET ORAL EVERY 6 HOURS PRN
Qty: 120 TABLET | Refills: 0 | OUTPATIENT
Start: 2019-10-09 | End: 2019-11-08

## 2019-10-28 RX ORDER — CARVEDILOL 3.12 MG/1
6.25 TABLET ORAL 2 TIMES DAILY
Qty: 360 TABLET | Refills: 3 | Status: SHIPPED | OUTPATIENT
Start: 2019-10-28 | End: 2019-10-29 | Stop reason: SDUPTHER

## 2019-10-29 RX ORDER — CARVEDILOL 3.12 MG/1
6.25 TABLET ORAL 2 TIMES DAILY
Qty: 56 TABLET | Refills: 0 | Status: SHIPPED | OUTPATIENT
Start: 2019-10-29 | End: 2020-03-19 | Stop reason: SDUPTHER

## 2019-11-14 RX ORDER — LISINOPRIL 20 MG/1
20 TABLET ORAL DAILY
Qty: 30 TABLET | Refills: 0 | Status: SHIPPED | OUTPATIENT
Start: 2019-11-14 | End: 2019-12-16 | Stop reason: SDUPTHER

## 2019-12-09 DIAGNOSIS — I10 ESSENTIAL HYPERTENSION: ICD-10-CM

## 2019-12-09 RX ORDER — HYDRALAZINE HYDROCHLORIDE 25 MG/1
25 TABLET, FILM COATED ORAL 2 TIMES DAILY
Qty: 180 TABLET | Refills: 2 | Status: SHIPPED | OUTPATIENT
Start: 2019-12-09 | End: 2020-03-19 | Stop reason: SDUPTHER

## 2019-12-16 RX ORDER — LISINOPRIL 20 MG/1
20 TABLET ORAL DAILY
Qty: 30 TABLET | Refills: 1 | Status: SHIPPED | OUTPATIENT
Start: 2019-12-16 | End: 2020-01-15 | Stop reason: SDUPTHER

## 2020-01-03 RX ORDER — OXYCODONE AND ACETAMINOPHEN 7.5; 325 MG/1; MG/1
1 TABLET ORAL EVERY 6 HOURS PRN
Qty: 120 TABLET | Refills: 0 | Status: SHIPPED | OUTPATIENT
Start: 2020-01-03 | End: 2020-01-15 | Stop reason: SDUPTHER

## 2020-01-15 ENCOUNTER — OFFICE VISIT (OUTPATIENT)
Dept: FAMILY MEDICINE CLINIC | Age: 85
End: 2020-01-15
Payer: MEDICARE

## 2020-01-15 VITALS
SYSTOLIC BLOOD PRESSURE: 120 MMHG | DIASTOLIC BLOOD PRESSURE: 68 MMHG | HEIGHT: 61 IN | WEIGHT: 101.8 LBS | OXYGEN SATURATION: 96 % | BODY MASS INDEX: 19.22 KG/M2 | HEART RATE: 59 BPM

## 2020-01-15 PROCEDURE — 4040F PNEUMOC VAC/ADMIN/RCVD: CPT | Performed by: FAMILY MEDICINE

## 2020-01-15 PROCEDURE — 99214 OFFICE O/P EST MOD 30 MIN: CPT | Performed by: FAMILY MEDICINE

## 2020-01-15 PROCEDURE — 20610 DRAIN/INJ JOINT/BURSA W/O US: CPT | Performed by: FAMILY MEDICINE

## 2020-01-15 PROCEDURE — 1090F PRES/ABSN URINE INCON ASSESS: CPT | Performed by: FAMILY MEDICINE

## 2020-01-15 PROCEDURE — 99212 OFFICE O/P EST SF 10 MIN: CPT

## 2020-01-15 PROCEDURE — G8427 DOCREV CUR MEDS BY ELIG CLIN: HCPCS | Performed by: FAMILY MEDICINE

## 2020-01-15 PROCEDURE — G8420 CALC BMI NORM PARAMETERS: HCPCS | Performed by: FAMILY MEDICINE

## 2020-01-15 PROCEDURE — 1123F ACP DISCUSS/DSCN MKR DOCD: CPT | Performed by: FAMILY MEDICINE

## 2020-01-15 PROCEDURE — G8482 FLU IMMUNIZE ORDER/ADMIN: HCPCS | Performed by: FAMILY MEDICINE

## 2020-01-15 PROCEDURE — 1036F TOBACCO NON-USER: CPT | Performed by: FAMILY MEDICINE

## 2020-01-15 RX ORDER — LISINOPRIL 20 MG/1
20 TABLET ORAL DAILY
Qty: 90 TABLET | Refills: 1 | Status: SHIPPED | OUTPATIENT
Start: 2020-01-15 | End: 2020-03-19 | Stop reason: SDUPTHER

## 2020-01-15 RX ORDER — OXYCODONE AND ACETAMINOPHEN 7.5; 325 MG/1; MG/1
1 TABLET ORAL EVERY 6 HOURS PRN
Qty: 120 TABLET | Refills: 0 | Status: SHIPPED | OUTPATIENT
Start: 2020-01-15 | End: 2020-01-15 | Stop reason: SDUPTHER

## 2020-01-15 RX ORDER — AMLODIPINE BESYLATE 2.5 MG/1
1 TABLET ORAL DAILY
COMMUNITY
Start: 2020-01-10 | End: 2020-03-18 | Stop reason: SDUPTHER

## 2020-01-15 RX ORDER — GABAPENTIN 300 MG/1
600 CAPSULE ORAL 2 TIMES DAILY
Qty: 360 CAPSULE | Refills: 0 | Status: SHIPPED | OUTPATIENT
Start: 2020-01-15 | End: 2020-03-19 | Stop reason: SDUPTHER

## 2020-01-15 RX ORDER — TRIAMCINOLONE ACETONIDE 40 MG/ML
40 INJECTION, SUSPENSION INTRA-ARTICULAR; INTRAMUSCULAR ONCE
Status: COMPLETED | OUTPATIENT
Start: 2020-01-15 | End: 2020-01-15

## 2020-01-15 RX ORDER — HYDROCHLOROTHIAZIDE 50 MG/1
1 TABLET ORAL DAILY
COMMUNITY
Start: 2020-01-10 | End: 2020-05-18

## 2020-01-15 RX ORDER — MIRTAZAPINE 15 MG/1
15 TABLET, FILM COATED ORAL NIGHTLY
Qty: 90 TABLET | Refills: 3 | Status: SHIPPED | OUTPATIENT
Start: 2020-01-15 | End: 2020-03-16

## 2020-01-15 RX ORDER — ROPINIROLE 0.25 MG/1
TABLET, FILM COATED ORAL
Qty: 90 TABLET | Refills: 3 | Status: SHIPPED | OUTPATIENT
Start: 2020-01-15 | End: 2020-03-19 | Stop reason: SDUPTHER

## 2020-01-15 RX ORDER — OXYCODONE AND ACETAMINOPHEN 7.5; 325 MG/1; MG/1
1 TABLET ORAL EVERY 6 HOURS PRN
Qty: 120 TABLET | Refills: 0 | Status: SHIPPED | OUTPATIENT
Start: 2020-01-15 | End: 2020-04-06 | Stop reason: SDUPTHER

## 2020-01-15 RX ADMIN — TRIAMCINOLONE ACETONIDE 40 MG: 40 INJECTION, SUSPENSION INTRA-ARTICULAR; INTRAMUSCULAR at 09:33

## 2020-01-15 RX ADMIN — TRIAMCINOLONE ACETONIDE 40 MG: 40 INJECTION, SUSPENSION INTRA-ARTICULAR; INTRAMUSCULAR at 09:34

## 2020-01-15 ASSESSMENT — ENCOUNTER SYMPTOMS
CONSTIPATION: 1
ABDOMINAL PAIN: 0
WHEEZING: 0
BACK PAIN: 1
COUGH: 1
CHEST TIGHTNESS: 0
SHORTNESS OF BREATH: 0

## 2020-01-15 NOTE — PROGRESS NOTES
(NEURONTIN) 300 MG capsule Take 2 capsules by mouth 2 times daily for 90 days. Indications: pt takes 2 300mg caps Twice daily 360 capsule 0    mirtazapine (REMERON) 15 MG tablet Take 1 tablet by mouth nightly 90 tablet 3    lisinopril (PRINIVIL;ZESTRIL) 20 MG tablet Take 1 tablet by mouth daily 90 tablet 1    oxyCODONE-acetaminophen (PERCOCET) 7.5-325 MG per tablet Take 1 tablet by mouth every 6 hours as needed for Pain for up to 30 days. Intended supply: 30 days 120 tablet 0    hydrALAZINE (APRESOLINE) 25 MG tablet Take 1 tablet by mouth 2 times daily 180 tablet 2    carvedilol (COREG) 3.125 MG tablet Take 2 tablets by mouth 2 times daily for 14 days 56 tablet 0    ezetimibe (ZETIA) 10 MG tablet TAKE 1 TABLET DAILY 90 tablet 2    Lancets MISC Test sugars once daily. R73.03 100 each 1    glucose blood VI test strips (ASCENSIA AUTODISC VI;ONE TOUCH ULTRA TEST VI) strip 1 each by In Vitro route daily As needed. 100 each 1    aspirin 81 MG chewable tablet Take 81 mg by mouth       Current Facility-Administered Medications   Medication Dose Route Frequency Provider Last Rate Last Dose    triamcinolone acetonide (KENALOG-40) injection 40 mg  40 mg Intramuscular Once Hermilo Partida MD        triamcinolone acetonide VIA [facility]) injection 40 mg  40 mg Intramuscular Once Hermilo Partida MD              Allergies   Allergen Reactions    Atorvastatin      Other reaction(s): Muscle Pain    Codeine     Diltiazem Hcl Hives    Levofloxacin      Other reaction(s): Intolerance-unknown    Pregabalin      lyrica    Simvastatin      Other reaction(s): Muscle Pain       Subjective:     Review of Systems   Constitutional: Negative for activity change, appetite change, chills, fatigue and fever. Eyes: Negative for visual disturbance. Respiratory: Positive for cough. Negative for chest tightness, shortness of breath and wheezing. Cardiovascular: Negative for chest pain, palpitations and leg swelling. Gastrointestinal: Positive for constipation. Negative for abdominal pain. Genitourinary: Negative for difficulty urinating. Musculoskeletal: Positive for arthralgias (shoulders), back pain and gait problem. Neurological: Positive for numbness (in left leg). Negative for dizziness, syncope, weakness and light-headedness. Psychiatric/Behavioral: Positive for sleep disturbance (better with requip). Negative for decreased concentration and dysphoric mood. The patient is not nervous/anxious. Objective:      Physical Exam  Vitals signs and nursing note reviewed. Constitutional:       General: She is not in acute distress. Appearance: She is well-developed. Eyes:      Conjunctiva/sclera: Conjunctivae normal.   Neck:      Musculoskeletal: Normal range of motion and neck supple. Thyroid: No thyromegaly. Cardiovascular:      Rate and Rhythm: Normal rate and regular rhythm. Heart sounds: Normal heart sounds. No murmur. Pulmonary:      Effort: Pulmonary effort is normal. No respiratory distress. Breath sounds: Normal breath sounds. No wheezing. Musculoskeletal:      Right shoulder: She exhibits decreased range of motion, crepitus and decreased strength. She exhibits no tenderness, no bony tenderness and no spasm. Left shoulder: She exhibits decreased range of motion, crepitus and decreased strength. She exhibits no tenderness, no swelling, no effusion, no deformity and no spasm. Lymphadenopathy:      Cervical: No cervical adenopathy. Skin:     General: Skin is warm and dry. Findings: No erythema or rash. Neurological:      Mental Status: She is alert and oriented to person, place, and time. /68 (Site: Right Upper Arm, Position: Sitting, Cuff Size: Medium Adult)   Pulse 59   Ht 5' 1\" (1.549 m)   Wt 101 lb 12.8 oz (46.2 kg)   SpO2 96%   BMI 19.23 kg/m²     Assessment:       Diagnosis Orders   1.  Chronic midline low back pain without sciatica oxyCODONE-acetaminophen (PERCOCET) 7.5-325 MG per tablet    DISCONTINUED: oxyCODONE-acetaminophen (PERCOCET) 7.5-325 MG per tablet   2. Chronic pain of both shoulders  TX ARTHROCENTESIS ASPIR&/INJ MAJOR JT/BURSA W/O US    triamcinolone acetonide (KENALOG-40) injection 40 mg    triamcinolone acetonide (KENALOG-40) injection 40 mg   3. Essential hypertension  Basic Metabolic Panel   4. Hyperlipidemia, mixed  Lipid Panel             Plan:        Back pain: stable; she is still having a lot of pain in her back and she is continuing to need regular percocet. I reassured her that the numbness in her leg is normal with her back pain and I advised her to make sure she is careful when she is walking around. Controlled Substance Monitoring:    Acute and Chronic Pain Monitoring:   RX Monitoring 1/15/2020   Attestation -   Periodic Controlled Substance Monitoring Possible medication side effects, risk of tolerance/dependence & alternative treatments discussed. ;No signs of potential drug abuse or diversion identified. ;Assessed functional status. ;Obtaining appropriate analgesic effect of treatment. Chronic Pain > 50 MEDD -   Chronic Pain > 80 MEDD Obtained or confirmed \"Medication Contract\" on file. Shoulder pain: worsening; she has trouble raising her arms above her head so she would like injections in her shoulders again. They typically help her for several months. Procedure Note    PREOP DIAGNOSIS:  [] Right []  Left    [x] Bilateral Shoulder Pain    POSTOP DIAGNOSIS:  [] Right []  Left    [x] Bilateral Shoulder Pain    OPERATION:  [] Right []  Left    [x] Bilateral INTRA-ARTICULAR Glenohumeral Injection    PROCEDURE:  After sterile prep, a posterior approach was used. 40 mg of Kenalog and 2 mL of 2% lidocaine without epi injected intra-articularly without incident. Pre- and post neurovascular status verified. No complications noted.     HTN: improving; her blood pressure is normal today and she is tolerating

## 2020-02-03 RX ORDER — EZETIMIBE 10 MG/1
TABLET ORAL
Qty: 90 TABLET | Refills: 3 | Status: SHIPPED | OUTPATIENT
Start: 2020-02-03 | End: 2020-03-18 | Stop reason: SDUPTHER

## 2020-03-16 RX ORDER — MIRTAZAPINE 15 MG/1
TABLET, FILM COATED ORAL
Qty: 90 TABLET | Refills: 3 | Status: SHIPPED | OUTPATIENT
Start: 2020-03-16 | End: 2021-02-11

## 2020-03-16 NOTE — TELEPHONE ENCOUNTER
Last Appt:  1/15/2020  Next Appt:   4/15/2020  Med verified in [facility] 3/16/2020

## 2020-03-18 NOTE — TELEPHONE ENCOUNTER
Last Appt:  1/15/2020  Next Appt:   4/15/2020  Med verified in [facility] 3/18/2020

## 2020-03-19 RX ORDER — LISINOPRIL 20 MG/1
20 TABLET ORAL DAILY
Qty: 90 TABLET | Refills: 1 | Status: SHIPPED | OUTPATIENT
Start: 2020-03-19 | End: 2020-08-20 | Stop reason: SDUPTHER

## 2020-03-19 RX ORDER — ROPINIROLE 0.25 MG/1
TABLET, FILM COATED ORAL
Qty: 90 TABLET | Refills: 3 | Status: SHIPPED | OUTPATIENT
Start: 2020-03-19 | End: 2020-12-11 | Stop reason: SDUPTHER

## 2020-03-19 RX ORDER — GABAPENTIN 300 MG/1
600 CAPSULE ORAL 2 TIMES DAILY
Qty: 360 CAPSULE | Refills: 0 | Status: SHIPPED | OUTPATIENT
Start: 2020-03-19 | End: 2020-06-23 | Stop reason: SDUPTHER

## 2020-03-19 RX ORDER — HYDRALAZINE HYDROCHLORIDE 25 MG/1
25 TABLET, FILM COATED ORAL 2 TIMES DAILY
Qty: 180 TABLET | Refills: 2 | Status: SHIPPED | OUTPATIENT
Start: 2020-03-19 | End: 2020-12-14

## 2020-03-19 RX ORDER — CARVEDILOL 3.12 MG/1
6.25 TABLET ORAL 2 TIMES DAILY
Qty: 56 TABLET | Refills: 0 | Status: SHIPPED | OUTPATIENT
Start: 2020-03-19 | End: 2020-04-22

## 2020-03-19 RX ORDER — EZETIMIBE 10 MG/1
TABLET ORAL
Qty: 90 TABLET | Refills: 3 | Status: SHIPPED | OUTPATIENT
Start: 2020-03-19 | End: 2021-02-11

## 2020-03-19 RX ORDER — AMLODIPINE BESYLATE 2.5 MG/1
2.5 TABLET ORAL DAILY
Qty: 30 TABLET | Refills: 2 | Status: SHIPPED | OUTPATIENT
Start: 2020-03-19 | End: 2020-05-18 | Stop reason: SDUPTHER

## 2020-04-06 DIAGNOSIS — M54.50 CHRONIC MIDLINE LOW BACK PAIN WITHOUT SCIATICA: ICD-10-CM

## 2020-04-06 DIAGNOSIS — G89.29 CHRONIC MIDLINE LOW BACK PAIN WITHOUT SCIATICA: ICD-10-CM

## 2020-04-06 RX ORDER — OXYCODONE AND ACETAMINOPHEN 7.5; 325 MG/1; MG/1
1 TABLET ORAL EVERY 6 HOURS PRN
Qty: 120 TABLET | Refills: 0 | Status: SHIPPED | OUTPATIENT
Start: 2020-04-06 | End: 2020-05-05 | Stop reason: SDUPTHER

## 2020-04-06 NOTE — TELEPHONE ENCOUNTER
Last Appt:  1/15/2020  Next Appt:   4/15/2020  Med verified in [facility] 4/6/2020

## 2020-05-05 RX ORDER — OXYCODONE AND ACETAMINOPHEN 7.5; 325 MG/1; MG/1
1 TABLET ORAL EVERY 6 HOURS PRN
Qty: 120 TABLET | Refills: 0 | Status: SHIPPED | OUTPATIENT
Start: 2020-05-05 | End: 2020-06-04 | Stop reason: SDUPTHER

## 2020-05-18 RX ORDER — HYDROCHLOROTHIAZIDE 25 MG/1
TABLET ORAL
Qty: 90 TABLET | Refills: 3 | OUTPATIENT
Start: 2020-05-18

## 2020-05-18 RX ORDER — AMLODIPINE BESYLATE 2.5 MG/1
2.5 TABLET ORAL DAILY
Qty: 90 TABLET | Refills: 1 | Status: SHIPPED | OUTPATIENT
Start: 2020-05-18 | End: 2021-02-11 | Stop reason: SDUPTHER

## 2020-05-18 RX ORDER — HYDROCHLOROTHIAZIDE 50 MG/1
50 TABLET ORAL DAILY
Qty: 90 TABLET | Refills: 1 | Status: SHIPPED | OUTPATIENT
Start: 2020-05-18 | End: 2020-08-20 | Stop reason: SDUPTHER

## 2020-05-18 NOTE — TELEPHONE ENCOUNTER
Mehul Dewey called requesting a refill of the below medication which has been pended for you:     Requested Prescriptions     Pending Prescriptions Disp Refills    hydroCHLOROthiazide (HYDRODIURIL) 25 MG tablet [Pharmacy Med Name: HYDROCHLOROTHIAZIDE TAB 25MG] 90 tablet 3     Sig: TAKE 1 TABLET DAILY       Last Appointment Date: 1/15/2020  Next Appointment Date: 6/23/2020    Allergies   Allergen Reactions    Atorvastatin      Other reaction(s): Muscle Pain    Codeine     Diltiazem Hcl Hives    Levofloxacin      Other reaction(s):  Intolerance-unknown    Pregabalin      lyrica    Simvastatin      Other reaction(s): Muscle Pain

## 2020-06-04 RX ORDER — OXYCODONE AND ACETAMINOPHEN 7.5; 325 MG/1; MG/1
1 TABLET ORAL EVERY 6 HOURS PRN
Qty: 120 TABLET | Refills: 0 | Status: SHIPPED | OUTPATIENT
Start: 2020-06-04 | End: 2020-06-23 | Stop reason: SDUPTHER

## 2020-06-23 ENCOUNTER — VIRTUAL VISIT (OUTPATIENT)
Dept: FAMILY MEDICINE CLINIC | Age: 85
End: 2020-06-23
Payer: MEDICARE

## 2020-06-23 VITALS
HEIGHT: 61 IN | WEIGHT: 105 LBS | BODY MASS INDEX: 19.83 KG/M2 | HEART RATE: 66 BPM | TEMPERATURE: 98.3 F | SYSTOLIC BLOOD PRESSURE: 145 MMHG | DIASTOLIC BLOOD PRESSURE: 55 MMHG

## 2020-06-23 PROCEDURE — 99213 OFFICE O/P EST LOW 20 MIN: CPT | Performed by: FAMILY MEDICINE

## 2020-06-23 PROCEDURE — 4040F PNEUMOC VAC/ADMIN/RCVD: CPT | Performed by: FAMILY MEDICINE

## 2020-06-23 PROCEDURE — G8427 DOCREV CUR MEDS BY ELIG CLIN: HCPCS | Performed by: FAMILY MEDICINE

## 2020-06-23 PROCEDURE — 1036F TOBACCO NON-USER: CPT | Performed by: FAMILY MEDICINE

## 2020-06-23 PROCEDURE — G0439 PPPS, SUBSEQ VISIT: HCPCS | Performed by: FAMILY MEDICINE

## 2020-06-23 PROCEDURE — 1123F ACP DISCUSS/DSCN MKR DOCD: CPT | Performed by: FAMILY MEDICINE

## 2020-06-23 PROCEDURE — G8420 CALC BMI NORM PARAMETERS: HCPCS | Performed by: FAMILY MEDICINE

## 2020-06-23 PROCEDURE — 1090F PRES/ABSN URINE INCON ASSESS: CPT | Performed by: FAMILY MEDICINE

## 2020-06-23 RX ORDER — OXYCODONE AND ACETAMINOPHEN 7.5; 325 MG/1; MG/1
1 TABLET ORAL EVERY 6 HOURS PRN
Qty: 120 TABLET | Refills: 0 | Status: SHIPPED | OUTPATIENT
Start: 2020-06-23 | End: 2020-07-23

## 2020-06-23 RX ORDER — OXYCODONE AND ACETAMINOPHEN 7.5; 325 MG/1; MG/1
1 TABLET ORAL EVERY 6 HOURS PRN
Qty: 120 TABLET | Refills: 0 | Status: SHIPPED | OUTPATIENT
Start: 2020-06-23 | End: 2020-06-23 | Stop reason: SDUPTHER

## 2020-06-23 RX ORDER — GABAPENTIN 300 MG/1
600 CAPSULE ORAL 2 TIMES DAILY
Qty: 360 CAPSULE | Refills: 0 | Status: SHIPPED | OUTPATIENT
Start: 2020-06-23 | End: 2020-09-27

## 2020-06-23 RX ORDER — LANCETS 30 GAUGE
EACH MISCELLANEOUS
Qty: 100 EACH | Refills: 1 | Status: SHIPPED | OUTPATIENT
Start: 2020-06-23 | End: 2021-08-31

## 2020-06-23 ASSESSMENT — ENCOUNTER SYMPTOMS
DIARRHEA: 0
COUGH: 0
CONSTIPATION: 0
SHORTNESS OF BREATH: 0
WHEEZING: 0
ABDOMINAL PAIN: 0
NAUSEA: 0
CHEST TIGHTNESS: 0

## 2020-06-23 ASSESSMENT — PATIENT HEALTH QUESTIONNAIRE - PHQ9
SUM OF ALL RESPONSES TO PHQ QUESTIONS 1-9: 0
SUM OF ALL RESPONSES TO PHQ QUESTIONS 1-9: 0

## 2020-06-23 NOTE — PROGRESS NOTES
3. Essential hypertension  Stable; her blood pressure has been well controlled so I will continue her current medications    4. Chronic bilateral low back pain without sciatica  Stable; she is doing well on percocet. I sent a 3 month's supply of medications to the pharmacy for her. Controlled Substance Monitoring:    Acute and Chronic Pain Monitoring:   RX Monitoring 6/23/2020   Attestation -   Periodic Controlled Substance Monitoring Possible medication side effects, risk of tolerance/dependence & alternative treatments discussed. ;No signs of potential drug abuse or diversion identified. ;Assessed functional status. ;Obtaining appropriate analgesic effect of treatment. Chronic Pain > 50 MEDD -   Chronic Pain > 80 MEDD -             Return in 3 months (on 9/23/2020) for Back pain follow up. Jagruti Brady is a 80 y.o. female being evaluated by a Virtual Visit (video visit) encounter to address concerns as mentioned above. A caregiver was present when appropriate. Due to this being a TeleHealth encounter (During IAYDU-27 public health emergency), evaluation of the following organ systems was limited: Vitals/Constitutional/EENT/Resp/CV/GI//MS/Neuro/Skin/Heme-Lymph-Imm. Pursuant to the emergency declaration under the [facility], [address] [place] [address] authority and the [employer] and [employer] Act, this Virtual Visit was conducted with patient's (and/or legal guardian's) consent, to reduce the patient's risk of exposure to COVID-19 and provide necessary medical care. The patient (and/or legal guardian) has also been advised to contact this office for worsening conditions or problems, and seek emergency medical treatment and/or call 911 if deemed necessary.      Patient identification was verified at the start of the visit: Yes    Total time spent on this encounter: Not billed by time    Services were provided through a video synchronous
written form: see Patient Instructions/AVS.    Lupe Reyes was seen today for medicare awv and foot swelling. Diagnoses and all orders for this visit:    Routine general medical examination at a health care facility    Essential hypertension    Other orders  -     gabapentin (NEURONTIN) 300 MG capsule; Take 2 capsules by mouth 2 times daily for 90 days. Indications: pt takes 2 300mg caps Twice daily  -     blood glucose test strips (ASCENSIA AUTODISC VI;ONE TOUCH ULTRA TEST VI) strip; 1 each by In Vitro route daily As needed. -     Lancets MISC; Test sugars once daily. R73.03          Return in 3 months (on 9/23/2020) for Back pain follow up. Hilario Rob is a 80 y.o. female being evaluated by a Virtual Visit (video and audio) encounter to address concerns as mentioned above. A caregiver was present when appropriate. Due to this being a TeleHealth encounter (During [facility]- public health emergency), evaluation of the following organ systems was limited: Vitals/Constitutional/EENT/Resp/CV/GI//MS/Neuro/Skin/Heme-Lymph-Imm. Pursuant to the emergency declaration under the [address] [place] [address] authority and the [employer] and [employer] Act, this Virtual Visit was conducted with patient's (and/or legal guardian's) consent, to reduce the patient's risk of exposure to COVID-19 and provide necessary medical care. The patient (and/or legal guardian) has also been advised to contact this office for worsening conditions or problems, and seek emergency medical treatment and/or call 911 if deemed necessary. Patient identification was verified at the start of the visit: Yes    Services were provided through a video synchronous discussion virtually to substitute for in-person clinic visit. --Megha Cordova MD on 6/23/2020 at 10:27 AM    An electronic signature was used to authenticate this note.

## 2020-07-28 ENCOUNTER — OFFICE VISIT (OUTPATIENT)
Dept: PRIMARY CARE CLINIC | Age: 85
End: 2020-07-28
Payer: MEDICARE

## 2020-07-28 VITALS
SYSTOLIC BLOOD PRESSURE: 138 MMHG | TEMPERATURE: 98 F | DIASTOLIC BLOOD PRESSURE: 60 MMHG | BODY MASS INDEX: 18.89 KG/M2 | HEART RATE: 74 BPM | OXYGEN SATURATION: 98 % | WEIGHT: 100 LBS

## 2020-07-28 PROCEDURE — G8420 CALC BMI NORM PARAMETERS: HCPCS | Performed by: FAMILY MEDICINE

## 2020-07-28 PROCEDURE — 99212 OFFICE O/P EST SF 10 MIN: CPT

## 2020-07-28 PROCEDURE — 99214 OFFICE O/P EST MOD 30 MIN: CPT | Performed by: FAMILY MEDICINE

## 2020-07-28 PROCEDURE — 1090F PRES/ABSN URINE INCON ASSESS: CPT | Performed by: FAMILY MEDICINE

## 2020-07-28 PROCEDURE — G8427 DOCREV CUR MEDS BY ELIG CLIN: HCPCS | Performed by: FAMILY MEDICINE

## 2020-07-28 PROCEDURE — 1036F TOBACCO NON-USER: CPT | Performed by: FAMILY MEDICINE

## 2020-07-28 PROCEDURE — 4040F PNEUMOC VAC/ADMIN/RCVD: CPT | Performed by: FAMILY MEDICINE

## 2020-07-28 PROCEDURE — 20610 DRAIN/INJ JOINT/BURSA W/O US: CPT | Performed by: FAMILY MEDICINE

## 2020-07-28 PROCEDURE — 1123F ACP DISCUSS/DSCN MKR DOCD: CPT | Performed by: FAMILY MEDICINE

## 2020-07-28 RX ORDER — TRIAMCINOLONE ACETONIDE 40 MG/ML
40 INJECTION, SUSPENSION INTRA-ARTICULAR; INTRAMUSCULAR ONCE
Status: COMPLETED | OUTPATIENT
Start: 2020-07-28 | End: 2020-07-28

## 2020-07-28 RX ORDER — LACTULOSE 10 G/15ML
10 SOLUTION ORAL EVERY EVENING
Qty: 150 ML | Refills: 1 | Status: SHIPPED | OUTPATIENT
Start: 2020-07-28 | End: 2021-02-11

## 2020-07-28 RX ADMIN — TRIAMCINOLONE ACETONIDE 40 MG: 40 INJECTION, SUSPENSION INTRA-ARTICULAR; INTRAMUSCULAR at 12:35

## 2020-07-28 RX ADMIN — TRIAMCINOLONE ACETONIDE 40 MG: 40 INJECTION, SUSPENSION INTRA-ARTICULAR; INTRAMUSCULAR at 12:34

## 2020-07-28 ASSESSMENT — PATIENT HEALTH QUESTIONNAIRE - PHQ9
1. LITTLE INTEREST OR PLEASURE IN DOING THINGS: 0
SUM OF ALL RESPONSES TO PHQ QUESTIONS 1-9: 0
2. FEELING DOWN, DEPRESSED OR HOPELESS: 0
SUM OF ALL RESPONSES TO PHQ QUESTIONS 1-9: 0
SUM OF ALL RESPONSES TO PHQ9 QUESTIONS 1 & 2: 0

## 2020-07-28 ASSESSMENT — ENCOUNTER SYMPTOMS
SHORTNESS OF BREATH: 0
CHEST TIGHTNESS: 0
CONSTIPATION: 1
ABDOMINAL PAIN: 1
COUGH: 0
VOMITING: 0
WHEEZING: 0
FLATUS: 0
NAUSEA: 0
DIARRHEA: 0

## 2020-07-28 NOTE — PROGRESS NOTES
[address] [place] [address]  Dept: [phone]  Dept Fax: [phone]  Loc: [phone]    Luis Temple is a 80 y.o. female who presents today for her medical conditions/complaints as noted below. Luis Temple is c/o of   Chief Complaint   Patient presents with    Shoulder Pain     bilat        HPI:     Here today for shoulder pain. Shoulder Pain    The pain is present in the right shoulder and left shoulder. This is a chronic problem. The current episode started more than 1 year ago. There has been no history of extremity trauma. The problem occurs constantly. The problem has been gradually worsening. The quality of the pain is described as aching. The pain is at a severity of 7/10. The pain is moderate. Associated symptoms include a limited range of motion and stiffness. Pertinent negatives include no fever, inability to bear weight, joint locking, joint swelling, numbness or tingling. The symptoms are aggravated by activity. She has tried cold and acetaminophen for the symptoms. The treatment provided mild relief. There is no history of osteoarthritis. Abdominal Pain   This is a new problem. The current episode started in the past 7 days (1 week). The onset quality is sudden. The problem occurs intermittently. The problem has been unchanged. The pain is located in the RUQ. The pain is at a severity of 4/10. The pain is moderate. The quality of the pain is sharp. The abdominal pain radiates to the right flank. Associated symptoms include arthralgias (bilateral shoulders) and constipation. Pertinent negatives include no anorexia, diarrhea, dysuria, fever, flatus, frequency, myalgias, nausea or vomiting. Nothing aggravates the pain. The pain is relieved by nothing. She has tried nothing for the symptoms. The treatment provided no relief.          Past Medical History:   Diagnosis Date    Anxiety Pregabalin      lyrica    Simvastatin      Other reaction(s): Muscle Pain       Subjective:     Review of Systems   Constitutional: Negative for activity change, appetite change, chills, fatigue and fever. Respiratory: Negative for cough, chest tightness, shortness of breath and wheezing. Cardiovascular: Negative for chest pain, palpitations and leg swelling. Gastrointestinal: Positive for abdominal pain and constipation. Negative for anorexia, diarrhea, flatus, nausea and vomiting. Genitourinary: Negative for difficulty urinating, dysuria and frequency. Musculoskeletal: Positive for arthralgias (bilateral shoulders) and stiffness. Negative for myalgias. Neurological: Negative for dizziness, tingling, syncope, weakness, light-headedness and numbness. Objective:      Physical Exam  Vitals signs and nursing note reviewed. Constitutional:       General: She is not in acute distress. Appearance: She is well-developed. Eyes:      Conjunctiva/sclera: Conjunctivae normal.   Neck:      Musculoskeletal: Normal range of motion and neck supple. Thyroid: No thyromegaly. Cardiovascular:      Rate and Rhythm: Normal rate and regular rhythm. Heart sounds: Normal heart sounds. No murmur. Pulmonary:      Effort: Pulmonary effort is normal. No respiratory distress. Breath sounds: Normal breath sounds. No wheezing. Abdominal:      General: Abdomen is flat. Bowel sounds are normal.      Palpations: Abdomen is soft. Tenderness: There is abdominal tenderness in the right upper quadrant and right lower quadrant. Musculoskeletal:      Right shoulder: She exhibits decreased range of motion, tenderness and crepitus. She exhibits no deformity, no spasm and normal strength. Left shoulder: She exhibits decreased range of motion, tenderness and crepitus. She exhibits no swelling, no deformity and no spasm. Lymphadenopathy:      Cervical: No cervical adenopathy.    Skin:     General: see patient instructions. Discussed use, benefit,and side effects of prescribed medications. All patient questions answered. Ptvoiced understanding. Reviewed health maintenance. Instructed to continue currentmedications, diet and exercise. Patient agreed with treatment plan. Follow up asdirected.      Electronically signed by Pily Zhang MD on 7/28/2020 at 12:37 PM

## 2020-07-30 ENCOUNTER — TELEPHONE (OUTPATIENT)
Dept: FAMILY MEDICINE CLINIC | Age: 85
End: 2020-07-30

## 2020-07-30 RX ORDER — LUBIPROSTONE 24 UG/1
24 CAPSULE, GELATIN COATED ORAL 2 TIMES DAILY WITH MEALS
Qty: 60 CAPSULE | Refills: 3 | Status: SHIPPED | OUTPATIENT
Start: 2020-07-30 | End: 2021-02-11

## 2020-07-30 NOTE — TELEPHONE ENCOUNTER
Lm on vm letting her know med was sent in, may need PA and I will complete and let her know outcome if it is needed

## 2020-07-30 NOTE — TELEPHONE ENCOUNTER
I sent in [address], but i'm sure there will be a prior auth. Let her know that we are working on finding something that works for her.

## 2020-07-30 NOTE — TELEPHONE ENCOUNTER
Patient was seen 07/28/2020 for having issues with her bowels and she states that she still can't move her bowels without pain. The pain sticks around for most of the day. She is wondering what else she can try? Please Advise.

## 2020-08-07 NOTE — TELEPHONE ENCOUNTER
Amrit Zhang called requesting a refill of the below medication which has been pended for you:     Requested Prescriptions     Pending Prescriptions Disp Refills    oxyCODONE-acetaminophen (PERCOCET) 7.5-325 MG per tablet 120 tablet 0     Sig: Take 1 tablet by mouth every 6 hours as needed for Pain for up to 30 days. Intended supply: 30 days       Last Appointment Date: 7/28/2020  Next Appointment Date: 9/23/2020    Allergies   Allergen Reactions    Atorvastatin      Other reaction(s): Muscle Pain    Codeine     Diltiazem Hcl Hives    Levofloxacin      Other reaction(s):  Intolerance-unknown    Pregabalin      lyrica    Simvastatin      Other reaction(s): Muscle Pain

## 2020-08-10 RX ORDER — OXYCODONE AND ACETAMINOPHEN 7.5; 325 MG/1; MG/1
1 TABLET ORAL EVERY 6 HOURS PRN
Qty: 120 TABLET | Refills: 0 | Status: SHIPPED | OUTPATIENT
Start: 2020-08-10 | End: 2020-09-09 | Stop reason: SDUPTHER

## 2020-08-20 RX ORDER — LISINOPRIL 20 MG/1
20 TABLET ORAL DAILY
Qty: 90 TABLET | Refills: 1 | Status: SHIPPED | OUTPATIENT
Start: 2020-08-20 | End: 2021-02-09 | Stop reason: SDUPTHER

## 2020-08-20 RX ORDER — HYDROCHLOROTHIAZIDE 50 MG/1
50 TABLET ORAL DAILY
Qty: 90 TABLET | Refills: 1 | Status: ON HOLD | OUTPATIENT
Start: 2020-08-20 | End: 2020-10-25 | Stop reason: HOSPADM

## 2020-09-09 RX ORDER — OXYCODONE AND ACETAMINOPHEN 7.5; 325 MG/1; MG/1
1 TABLET ORAL EVERY 6 HOURS PRN
Qty: 120 TABLET | Refills: 0 | Status: SHIPPED | OUTPATIENT
Start: 2020-09-09 | End: 2020-10-05 | Stop reason: SDUPTHER

## 2020-09-09 NOTE — TELEPHONE ENCOUNTER
Patient calling for a refill sent to [facility] in [place]   Requested Prescriptions     Pending Prescriptions Disp Refills    oxyCODONE-acetaminophen (PERCOCET) 7.5-325 MG per tablet 120 tablet 0     Sig: Take 1 tablet by mouth every 6 hours as needed for Pain for up to 30 days.  Intended supply: 30 days     Last Appt:  6/23/2020  Next Appt:   9/23/2020  Med verified in Epic

## 2020-10-05 RX ORDER — OXYCODONE AND ACETAMINOPHEN 7.5; 325 MG/1; MG/1
1 TABLET ORAL EVERY 6 HOURS PRN
Qty: 120 TABLET | Refills: 0 | Status: SHIPPED | OUTPATIENT
Start: 2020-10-05 | End: 2020-10-08 | Stop reason: SDUPTHER

## 2020-10-05 NOTE — TELEPHONE ENCOUNTER
Patient calling for a refill   Requested Prescriptions     Pending Prescriptions Disp Refills    oxyCODONE-acetaminophen (PERCOCET) 7.5-325 MG per tablet 120 tablet 0     Sig: Take 1 tablet by mouth every 6 hours as needed for Pain for up to 30 days.  Intended supply: 30 days     Last Appt:  6/23/2020  Next Appt:   10/8/2020  Med verified in Epic

## 2020-10-08 ENCOUNTER — HOSPITAL ENCOUNTER (OUTPATIENT)
Dept: CT IMAGING | Age: 85
Discharge: HOME | End: 2020-10-10
Payer: MEDICARE

## 2020-10-08 ENCOUNTER — IMMUNIZATION (OUTPATIENT)
Dept: LAB | Age: 85
End: 2020-10-08
Payer: MEDICARE

## 2020-10-08 ENCOUNTER — VIRTUAL VISIT (OUTPATIENT)
Dept: FAMILY MEDICINE CLINIC | Age: 85
End: 2020-10-08
Payer: MEDICARE

## 2020-10-08 ENCOUNTER — HOSPITAL ENCOUNTER (OUTPATIENT)
Dept: LAB | Age: 85
Discharge: HOME | End: 2020-10-08
Payer: MEDICARE

## 2020-10-08 LAB
ABSOLUTE EOS #: 0.08 K/UL (ref 0–0.44)
ABSOLUTE IMMATURE GRANULOCYTE: <0.03 K/UL (ref 0–0.3)
ABSOLUTE LYMPH #: 2.18 K/UL (ref 1.1–3.7)
ABSOLUTE MONO #: 0.71 K/UL (ref 0.1–1.2)
ALBUMIN SERPL-MCNC: 4.3 G/DL (ref 3.5–5.2)
ALBUMIN/GLOBULIN RATIO: 1.4 (ref 1–2.5)
ALP BLD-CCNC: 61 U/L (ref 35–104)
ALT SERPL-CCNC: 11 U/L (ref 5–33)
ANION GAP SERPL CALCULATED.3IONS-SCNC: 10 MMOL/L (ref 9–17)
AST SERPL-CCNC: 22 U/L
BASOPHILS # BLD: 1 % (ref 0–2)
BASOPHILS ABSOLUTE: 0.04 K/UL (ref 0–0.2)
BILIRUB SERPL-MCNC: 0.31 MG/DL (ref 0.3–1.2)
BUN BLDV-MCNC: 19 MG/DL (ref 8–23)
BUN/CREAT BLD: 18 (ref 9–20)
CALCIUM SERPL-MCNC: 9.3 MG/DL (ref 8.6–10.4)
CHLORIDE BLD-SCNC: 98 MMOL/L (ref 98–107)
CHOLESTEROL/HDL RATIO: 2.5
CHOLESTEROL: 158 MG/DL
CO2: 26 MMOL/L (ref 20–31)
CREAT SERPL-MCNC: 1.08 MG/DL (ref 0.5–0.9)
DIFFERENTIAL TYPE: ABNORMAL
EOSINOPHILS RELATIVE PERCENT: 1 % (ref 1–4)
GFR AFRICAN AMERICAN: 58 ML/MIN
GFR NON-AFRICAN AMERICAN: 47 ML/MIN
GFR SERPL CREATININE-BSD FRML MDRD: ABNORMAL ML/MIN/{1.73_M2}
GFR SERPL CREATININE-BSD FRML MDRD: ABNORMAL ML/MIN/{1.73_M2}
GLUCOSE BLD-MCNC: 128 MG/DL (ref 70–99)
HCT VFR BLD CALC: 36.8 % (ref 36.3–47.1)
HDLC SERPL-MCNC: 63 MG/DL
HEMOGLOBIN: 11 G/DL (ref 11.9–15.1)
IMMATURE GRANULOCYTES: 0 %
LDL CHOLESTEROL: 64 MG/DL (ref 0–130)
LYMPHOCYTES # BLD: 39 % (ref 24–43)
MCH RBC QN AUTO: 29.5 PG (ref 25.2–33.5)
MCHC RBC AUTO-ENTMCNC: 29.9 G/DL (ref 25.2–33.5)
MCV RBC AUTO: 98.7 FL (ref 82.6–102.9)
MONOCYTES # BLD: 13 % (ref 3–12)
NRBC AUTOMATED: 0 PER 100 WBC
PDW BLD-RTO: 14 % (ref 11.8–14.4)
PLATELET # BLD: 227 K/UL (ref 138–453)
PLATELET ESTIMATE: ABNORMAL
PMV BLD AUTO: 10 FL (ref 8.1–13.5)
POTASSIUM SERPL-SCNC: 4.6 MMOL/L (ref 3.7–5.3)
RBC # BLD: 3.73 M/UL (ref 3.95–5.11)
RBC # BLD: ABNORMAL 10*6/UL
SEG NEUTROPHILS: 46 % (ref 36–65)
SEGMENTED NEUTROPHILS ABSOLUTE COUNT: 2.63 K/UL (ref 1.5–8.1)
SODIUM BLD-SCNC: 134 MMOL/L (ref 135–144)
TOTAL PROTEIN: 7.3 G/DL (ref 6.4–8.3)
TRIGL SERPL-MCNC: 156 MG/DL
VLDLC SERPL CALC-MCNC: ABNORMAL MG/DL (ref 1–30)
WBC # BLD: 5.7 K/UL (ref 3.5–11.3)
WBC # BLD: ABNORMAL 10*3/UL

## 2020-10-08 PROCEDURE — 99214 OFFICE O/P EST MOD 30 MIN: CPT | Performed by: FAMILY MEDICINE

## 2020-10-08 PROCEDURE — 80053 COMPREHEN METABOLIC PANEL: CPT

## 2020-10-08 PROCEDURE — 6360000004 HC RX CONTRAST MEDICATION: Performed by: FAMILY MEDICINE

## 2020-10-08 PROCEDURE — PBSHW INFLUENZA, QUADV, ADJUVANTED, 65 YRS +, IM, PF, PREFILL SYR, 0.5ML (FLUAD): Performed by: FAMILY MEDICINE

## 2020-10-08 PROCEDURE — 4040F PNEUMOC VAC/ADMIN/RCVD: CPT | Performed by: FAMILY MEDICINE

## 2020-10-08 PROCEDURE — 1090F PRES/ABSN URINE INCON ASSESS: CPT | Performed by: FAMILY MEDICINE

## 2020-10-08 PROCEDURE — 36415 COLL VENOUS BLD VENIPUNCTURE: CPT

## 2020-10-08 PROCEDURE — 1123F ACP DISCUSS/DSCN MKR DOCD: CPT | Performed by: FAMILY MEDICINE

## 2020-10-08 PROCEDURE — 2709999900 CT ABDOMEN PELVIS W IV CONTRAST

## 2020-10-08 PROCEDURE — 85025 COMPLETE CBC W/AUTO DIFF WBC: CPT

## 2020-10-08 PROCEDURE — G8427 DOCREV CUR MEDS BY ELIG CLIN: HCPCS | Performed by: FAMILY MEDICINE

## 2020-10-08 PROCEDURE — 80061 LIPID PANEL: CPT

## 2020-10-08 PROCEDURE — 90694 VACC AIIV4 NO PRSRV 0.5ML IM: CPT | Performed by: FAMILY MEDICINE

## 2020-10-08 RX ORDER — OXYCODONE AND ACETAMINOPHEN 7.5; 325 MG/1; MG/1
1 TABLET ORAL EVERY 6 HOURS PRN
Qty: 120 TABLET | Refills: 0 | Status: SHIPPED | OUTPATIENT
Start: 2020-10-08 | End: 2020-10-08 | Stop reason: SDUPTHER

## 2020-10-08 RX ORDER — OXYCODONE AND ACETAMINOPHEN 7.5; 325 MG/1; MG/1
1 TABLET ORAL EVERY 6 HOURS PRN
Qty: 120 TABLET | Refills: 0 | Status: SHIPPED | OUTPATIENT
Start: 2020-10-08 | End: 2020-11-09 | Stop reason: SDUPTHER

## 2020-10-08 RX ORDER — SENNA PLUS 8.6 MG/1
1 TABLET ORAL 2 TIMES DAILY PRN
Qty: 60 TABLET | Refills: 2 | Status: SHIPPED | OUTPATIENT
Start: 2020-10-08 | End: 2021-03-08 | Stop reason: ALTCHOICE

## 2020-10-08 RX ADMIN — IOHEXOL 50 ML: 240 INJECTION, SOLUTION INTRATHECAL; INTRAVASCULAR; INTRAVENOUS; ORAL at 10:15

## 2020-10-08 RX ADMIN — IOPAMIDOL 50 ML: 755 INJECTION, SOLUTION INTRAVENOUS at 11:21

## 2020-10-08 ASSESSMENT — ENCOUNTER SYMPTOMS
COUGH: 0
NAUSEA: 0
CHEST TIGHTNESS: 0
VOMITING: 0
BACK PAIN: 1
CONSTIPATION: 1
HEMATOCHEZIA: 0
DIARRHEA: 0
SHORTNESS OF BREATH: 0
BELCHING: 0
FLATUS: 0
WHEEZING: 0
ABDOMINAL PAIN: 1
BLOOD IN STOOL: 0

## 2020-10-08 NOTE — PROGRESS NOTES
10/8/2020    TELEHEALTH EVALUATION -- Audio/Visual (During LXFOJ-28 public health emergency)    HPI: Seen today via video visit while she was at home and I was at work    [employer] (:  [id number]) has requested an audio/video evaluation for the following concern(s):    Back pain: stable; she feels like her back has been doing about the same as always. She is still taking her percocet 4 times a day and does get constipated from them, but she typically uses some miralax which she feels helps. She does not get nauseated from her medication and she feels like it really helps her to be more functional in regards to her pain. Abdominal Pain   This is a new problem. The current episode started more than 1 month ago. The problem occurs constantly. The problem has been gradually worsening. The pain is located in the RLQ. The pain is at a severity of 5/10. The quality of the pain is aching and dull. The abdominal pain radiates to the back. Associated symptoms include anorexia, arthralgias (shoulders are doing pretty well), constipation and weight loss. Pertinent negatives include no belching, diarrhea, dysuria, fever, flatus, frequency, headaches, hematochezia, hematuria, nausea or vomiting. Exacerbated by: she tried stopping eating nuts. The pain is relieved by nothing. She has tried nothing for the symptoms. The treatment provided no relief. Review of Systems   Constitutional: Positive for appetite change, unexpected weight change (she has lost 5 pounds in a month and 10 pounds since ) and weight loss. Negative for activity change, chills, fatigue and fever. Eyes: Negative for visual disturbance. Respiratory: Negative for cough, chest tightness, shortness of breath and wheezing. Cardiovascular: Negative for chest pain, palpitations and leg swelling. Gastrointestinal: Positive for abdominal pain, anorexia and constipation.  Negative for blood in stool, diarrhea, flatus, hematochezia, nausea and vomiting. Genitourinary: Negative for difficulty urinating, dysuria, frequency and hematuria. Musculoskeletal: Positive for arthralgias (shoulders are doing pretty well) and back pain (chronic). Neurological: Negative for dizziness, syncope, weakness, light-headedness and headaches. Prior to Visit Medications    Medication Sig Taking? Authorizing Provider   oxyCODONE-acetaminophen (PERCOCET) 7.5-325 MG per tablet Take 1 tablet by mouth every 6 hours as needed for Pain for up to 30 days. Intended supply: 30 days Yes Sarita Israel MD   gabapentin (NEURONTIN) 300 MG capsule Take 2 capsules by mouth 2 times daily for 90 days. Yes Sarita Israel MD   lisinopril (PRINIVIL;ZESTRIL) 20 MG tablet Take 1 tablet by mouth daily Yes Sarita Israel MD   hydroCHLOROthiazide (HYDRODIURIL) 50 MG tablet Take 1 tablet by mouth daily Yes Sarita Israel MD   naloxegol (MOVANTIK) 25 MG TABS tablet Take 1 tablet by mouth every morning Yes Sarita Israel MD   lubiprostone (AMITIZA) 24 MCG capsule Take 1 capsule by mouth 2 times daily (with meals) Yes Sarita Israel MD   diclofenac sodium (VOLTAREN) 1 % GEL Apply 2 g topically 4 times daily Yes Sarita Israel MD   lactulose (CHRONULAC) 10 GM/15ML solution Take 15 mLs by mouth every evening Yes Sarita Israel MD   blood glucose test strips (ASCENSIA AUTODISC VI;ONE TOUCH ULTRA TEST VI) strip 1 each by In Vitro route daily As needed. Yes Sarita Israel MD   Lancets MISC Test sugars once daily.   R73.03 Yes Sarita Israel MD   amLODIPine (NORVASC) 2.5 MG tablet Take 1 tablet by mouth daily Yes Sarita Israel MD   carvedilol (COREG) 3.125 MG tablet Take 2 tablets by mouth 2 times daily (with meals) Yes Sarita Israel MD   hydrALAZINE (APRESOLINE) 25 MG tablet Take 1 tablet by mouth 2 times daily Yes Sarita Israel MD   ezetimibe (ZETIA) 10 MG tablet TAKE 1 TABLET DAILY Yes Sarita Israel MD   rOPINIRole (REQUIP) 0.25 MG tablet TAKE 1 TABLET BY MOUTH NIGHTLY Yes Dorinda Birch Neck: [x] No visualized mass     Pulmonary/Chest: [x] Respiratory effort normal.  [x] No visualized signs of difficulty breathing or respiratory distress        [] Abnormal-      Musculoskeletal:        [x] Normal range of motion of neck        [] Abnormal-       Neurological:        [x] No Facial Asymmetry (Cranial nerve 7 motor function) (limited exam to video visit)          [] Abnormal-         Skin:        [x] No significant exanthematous lesions or discoloration noted on facial skin         [] Abnormal-            Psychiatric:       [x] Normal Affect [x] No Hallucinations        [] Abnormal-     Other pertinent observable physical exam findings-     ASSESSMENT/PLAN:  1. RLQ abdominal pain  New; I am concerned for diverticulitis or malignancy based on her weight loss. I ordered labs and a CT to evaluate.     - CT ABDOMEN PELVIS W IV CONTRAST Additional Contrast? Oral; Future  - CBC Auto Differential; Future  - Comprehensive Metabolic Panel; Future    2. Chronic midline low back pain without sciatica  Stable; she is doing well on the percocet and it is giving her good relief of her pain. I had her resign her pain contract since her last one  in . (she signed it when she came in for her CT). She was given a 3 month's supply of her medications. - oxyCODONE-acetaminophen (PERCOCET) 7.5-325 MG per tablet; Take 1 tablet by mouth every 6 hours as needed for Pain for up to 30 days. Intended supply: 30 days  Dispense: 120 tablet; Refill: 0    Controlled Substance Monitoring:    Acute and Chronic Pain Monitoring:   RX Monitoring 10/8/2020   Attestation -   Periodic Controlled Substance Monitoring Possible medication side effects, risk of tolerance/dependence & alternative treatments discussed. ;No signs of potential drug abuse or diversion identified. ;Assessed functional status. ;Obtaining appropriate analgesic effect of treatment.    Chronic Pain > 50 MEDD -   Chronic Pain > 80 MEDD Obtained or confirmed  Platelets 41/61/6127 227  138 - 453 k/uL Final    MPV 10/08/2020 10.0  8.1 - 13.5 fL Final    NRBC Automated 10/08/2020 0.0  0.0 per 100 WBC Final    Differential Type 10/08/2020 NOT REPORTED   Final    Seg Neutrophils 10/08/2020 46  36 - 65 % Final    Lymphocytes 10/08/2020 39  24 - 43 % Final    Monocytes 10/08/2020 13* 3 - 12 % Final    Eosinophils % 10/08/2020 1  1 - 4 % Final    Basophils 10/08/2020 1  0 - 2 % Final    Immature Granulocytes 10/08/2020 0  0 % Final    Segs Absolute 10/08/2020 2.63  1.50 - 8.10 k/uL Final    Absolute Lymph # 10/08/2020 2.18  1.10 - 3.70 k/uL Final    Absolute Mono # 10/08/2020 0.71  0.10 - 1.20 k/uL Final    Absolute Eos # 10/08/2020 0.08  0.00 - 0.44 k/uL Final    Basophils Absolute 10/08/2020 0.04  0.00 - 0.20 k/uL Final    Absolute Immature Granulocyte 10/08/2020 <0.03  0.00 - 0.30 k/uL Final    WBC Morphology 10/08/2020 NOT REPORTED   Final    RBC Morphology 10/08/2020 NOT REPORTED   Final    Platelet Estimate 35/55/1860 NOT REPORTED   Final     Ct Abdomen Pelvis W Iv Contrast Additional Contrast? Oral    Result Date: 10/8/2020  EXAMINATION: CT OF THE ABDOMEN AND PELVIS WITH CONTRAST 10/8/2020 10:26 am TECHNIQUE: CT of the abdomen and pelvis was performed with the administration of intravenous contrast. Multiplanar reformatted images are provided for review. Dose modulation, iterative reconstruction, and/or weight based adjustment of the mA/kV was utilized to reduce the radiation dose to as low as reasonably achievable. COMPARISON: None HISTORY: ORDERING SYSTEM PROVIDED HISTORY: [facility] abdominal pain TECHNOLOGIST PROVIDED HISTORY: concern for diveriticulitis FINDINGS: Lower Chest: Moderate hiatal hernia. No pleural effusions. Organs: No liver lesion. No gallstones. Common bile duct measures 10 mm. No discrete filling defect. Pancreatic duct within the head of the pancreas is minimally dilated. No discrete pancreatic lesion.   Overall pancreas is atrophied. No splenomegaly. No adrenal lesion. No hydronephrosis. No renal lesion. GI/Bowel: Moderate hiatal hernia. No bowel obstruction. No acute inflammatory process. Large stool volume. Pelvis: No free fluid. No bladder calculus. Peritoneum/Retroperitoneum: Atherosclerotic calcification of the abdominal aorta without aneurysmal dilatation. No adenopathy. Bones/Soft Tissues: No acute soft tissue abnormality. Lumbar spine degenerative changes. Large stool volume. No bowel obstruction. No acute inflammatory process. Dilated common bile duct with slightly dilated pancreatic duct. Given age findings are nonspecific. If clinically indicated MRCP with contrast can be obtained. Return if symptoms worsen or fail to improve. Louis Mario is a 80 y.o. female being evaluated by a Virtual Visit (video visit) encounter to address concerns as mentioned above. A caregiver was present when appropriate. Due to this being a TeleHealth encounter (During [place] [id number] public health emergency), evaluation of the following organ systems was limited: Vitals/Constitutional/EENT/Resp/CV/GI//MS/Neuro/Skin/Heme-Lymph-Imm. Pursuant to the emergency declaration under the [address] [place] [address], [address] [place] [address] authority and the [facility] [employer] Act, this Virtual Visit was conducted with patient's (and/or legal guardian's) consent, to reduce the patient's risk of exposure to COVID-19 and provide necessary medical care. The patient (and/or legal guardian) has also been advised to contact this office for worsening conditions or problems, and seek emergency medical treatment and/or call 911 if deemed necessary. Patient identification was verified at the start of the visit: Yes    Total time spent on this encounter: 25 minutes    Services were provided through a video synchronous discussion virtually to substitute for in-person clinic visit.  Patient and provider were located at their individual homes. --Donna Bergeron MD on 10/8/2020 at 1:46 PM    An electronic signature was used to authenticate this note.

## 2020-10-19 ENCOUNTER — TELEPHONE (OUTPATIENT)
Dept: FAMILY MEDICINE CLINIC | Age: 85
End: 2020-10-19

## 2020-10-19 RX ORDER — BUSPIRONE HYDROCHLORIDE 5 MG/1
5 TABLET ORAL 3 TIMES DAILY PRN
Qty: 90 TABLET | Refills: 1 | Status: SHIPPED | OUTPATIENT
Start: 2020-10-19 | End: 2020-10-21 | Stop reason: SINTOL

## 2020-10-19 NOTE — TELEPHONE ENCOUNTER
I sent in some buspar for her, but also tell her that she needs to get out of the house occasionally. Even when its cold she should try to walk several times a week. She also can go on drives with her family and/or she can go to a store for an errand occasionally as long as she wears her mask. Have her tell her granddaughter that on occasional outing is good for the soul. In the meantime when she is stuck at home she needs to do some sort of exercise each day to get her heart rate up which will help. Also have her try a new hobby (painting, knitting, playing wii, cross words, playing cards etc) so that she has something to keep her mind active.

## 2020-10-19 NOTE — TELEPHONE ENCOUNTER
She stated she has been a nervous mess due to staying inside all the time and not going out.  She stated she doesn't know what to do with her self

## 2020-10-19 NOTE — TELEPHONE ENCOUNTER
Pt states she's been really jittery for the past week and questions if we can call something in for this, pt states she just doesn't know what to do for herself, pt uses [facility], please advise at above number.

## 2020-10-19 NOTE — TELEPHONE ENCOUNTER
Spoke to Cristal Jones and read Dr. Re Fallon note word for word. She stated she would love to get out and asked if I would talk to her granddaughter yancy.  I will call her in the morning [phone]

## 2020-10-20 NOTE — TELEPHONE ENCOUNTER
Lm on yancy's vm asking her to call office to discuss Oneal Evangelista's note.  I did read it to leave on her vm so she know what we will be discussing

## 2020-10-21 ENCOUNTER — TELEPHONE (OUTPATIENT)
Dept: FAMILY MEDICINE CLINIC | Age: 85
End: 2020-10-21

## 2020-10-21 NOTE — TELEPHONE ENCOUNTER
Patient states since she started Buspar this Monday she feels more nervous and jittery since starting this. Advised patient Dr Huynh Led out this afternoon and she could stop taking the Buspar if she though it was making her feel worse since she just started Monday and we would get back with her tomorrow with Dr Simeon Jin suggestion.

## 2020-10-22 ENCOUNTER — TELEPHONE (OUTPATIENT)
Dept: FAMILY MEDICINE CLINIC | Age: 85
End: 2020-10-22

## 2020-10-22 NOTE — TELEPHONE ENCOUNTER
Patient's daughter called about stopping her mother's medication. States she still feels jittery, now she is dizzy, and weak. States the Miralax was helpful.   [facility]

## 2020-10-22 NOTE — TELEPHONE ENCOUNTER
I am concerned since she has been feeling so poorly for the past few weeks. I would recommend that she come in to be seen (like into the office, not virtual) and UC would work too. But I am concerned about her weakness and the generally jittery feeling can be concerning in the elderly that something is wrong. She may need a urine sample and/or more blood work.

## 2020-10-22 NOTE — TELEPHONE ENCOUNTER
Patient's daughter called about stopping her mother's medication. States she still feels jittery, now she is dizzy, and weak. States the Miralax was helpful. Powell Gaucher did ask for a call back    Any further advice?

## 2020-10-22 NOTE — TELEPHONE ENCOUNTER
Left message for Ciaran Felix, relayed Dr Melissa Ji message. Ciaran Felix to return call in the morning.

## 2020-10-23 NOTE — TELEPHONE ENCOUNTER
Informed Brittany of Dr Mariano Guerrero recommendation. Brittany agreed to have pt come in to UC to be seen. Will bring her in this weekend, into UC as LK is in UC.

## 2020-10-24 ENCOUNTER — APPOINTMENT (OUTPATIENT)
Dept: CT IMAGING | Age: 85
End: 2020-10-24
Payer: MEDICARE

## 2020-10-24 ENCOUNTER — HOSPITAL ENCOUNTER (OUTPATIENT)
Age: 85
Setting detail: SPECIMEN
Discharge: HOME | End: 2020-10-24
Payer: MEDICARE

## 2020-10-24 ENCOUNTER — HOSPITAL ENCOUNTER (OUTPATIENT)
Age: 85
Setting detail: OBSERVATION
Discharge: HOME | End: 2020-10-25
Attending: EMERGENCY MEDICINE | Admitting: FAMILY MEDICINE
Payer: MEDICARE

## 2020-10-24 ENCOUNTER — OFFICE VISIT (OUTPATIENT)
Dept: PRIMARY CARE CLINIC | Age: 85
End: 2020-10-24
Payer: MEDICARE

## 2020-10-24 VITALS
RESPIRATION RATE: 16 BRPM | DIASTOLIC BLOOD PRESSURE: 80 MMHG | SYSTOLIC BLOOD PRESSURE: 150 MMHG | WEIGHT: 91 LBS | HEIGHT: 61 IN | OXYGEN SATURATION: 98 % | TEMPERATURE: 98.1 F | BODY MASS INDEX: 17.18 KG/M2 | HEART RATE: 70 BPM

## 2020-10-24 PROBLEM — K59.00 CONSTIPATION: Status: ACTIVE | Noted: 2020-10-24

## 2020-10-24 LAB
-: NORMAL
ABSOLUTE EOS #: 0.04 K/UL (ref 0–0.44)
ABSOLUTE IMMATURE GRANULOCYTE: <0.03 K/UL (ref 0–0.3)
ABSOLUTE LYMPH #: 2.07 K/UL (ref 1.1–3.7)
ABSOLUTE MONO #: 0.91 K/UL (ref 0.1–1.2)
ALBUMIN SERPL-MCNC: 4.3 G/DL (ref 3.5–5.2)
ALBUMIN/GLOBULIN RATIO: 1.5 (ref 1–2.5)
ALP BLD-CCNC: 70 U/L (ref 35–104)
ALT SERPL-CCNC: 12 U/L (ref 5–33)
AMORPHOUS: NORMAL
AMYLASE: 68 U/L (ref 28–100)
ANION GAP SERPL CALCULATED.3IONS-SCNC: 11 MMOL/L (ref 9–17)
AST SERPL-CCNC: 21 U/L
BACTERIA: NORMAL
BASOPHILS # BLD: 1 % (ref 0–2)
BASOPHILS ABSOLUTE: 0.04 K/UL (ref 0–0.2)
BILIRUB SERPL-MCNC: 0.4 MG/DL (ref 0.3–1.2)
BILIRUBIN URINE: NEGATIVE
BUN BLDV-MCNC: 19 MG/DL (ref 8–23)
BUN/CREAT BLD: 24 (ref 9–20)
CALCIUM SERPL-MCNC: 9.5 MG/DL (ref 8.6–10.4)
CASTS UA: NORMAL /LPF (ref 0–2)
CHLORIDE BLD-SCNC: 87 MMOL/L (ref 98–107)
CO2: 26 MMOL/L (ref 20–31)
COLOR: ABNORMAL
COMMENT UA: ABNORMAL
CREAT SERPL-MCNC: 0.8 MG/DL (ref 0.5–0.9)
CRYSTALS, UA: NORMAL /HPF
DIFFERENTIAL TYPE: ABNORMAL
EOSINOPHILS RELATIVE PERCENT: 1 % (ref 1–4)
EPITHELIAL CELLS UA: NORMAL /HPF (ref 0–5)
GFR AFRICAN AMERICAN: >60 ML/MIN
GFR NON-AFRICAN AMERICAN: >60 ML/MIN
GFR SERPL CREATININE-BSD FRML MDRD: ABNORMAL ML/MIN/{1.73_M2}
GFR SERPL CREATININE-BSD FRML MDRD: ABNORMAL ML/MIN/{1.73_M2}
GLUCOSE BLD-MCNC: 147 MG/DL (ref 70–99)
GLUCOSE URINE: NEGATIVE
HCT VFR BLD CALC: 36.7 % (ref 36.3–47.1)
HEMOGLOBIN: 11.8 G/DL (ref 11.9–15.1)
IMMATURE GRANULOCYTES: 0 %
KETONES, URINE: NEGATIVE
LACTIC ACID, SEPSIS WHOLE BLOOD: NORMAL MMOL/L (ref 0.5–1.9)
LACTIC ACID, SEPSIS: 0.7 MMOL/L (ref 0.5–1.9)
LEUKOCYTE ESTERASE, URINE: NEGATIVE
LIPASE: 54 U/L (ref 13–60)
LYMPHOCYTES # BLD: 27 % (ref 24–43)
MCH RBC QN AUTO: 30 PG (ref 25.2–33.5)
MCHC RBC AUTO-ENTMCNC: 32.2 G/DL (ref 25.2–33.5)
MCV RBC AUTO: 93.4 FL (ref 82.6–102.9)
MONOCYTES # BLD: 12 % (ref 3–12)
MUCUS: NORMAL
NITRITE, URINE: NEGATIVE
NRBC AUTOMATED: 0 PER 100 WBC
OTHER OBSERVATIONS UA: NORMAL
PDW BLD-RTO: 12.9 % (ref 11.8–14.4)
PH UA: 7 (ref 5–6)
PLATELET # BLD: 287 K/UL (ref 138–453)
PLATELET ESTIMATE: ABNORMAL
PMV BLD AUTO: 9.7 FL (ref 8.1–13.5)
POTASSIUM SERPL-SCNC: 4.8 MMOL/L (ref 3.7–5.3)
PROTEIN UA: NEGATIVE
RBC # BLD: 3.93 M/UL (ref 3.95–5.11)
RBC # BLD: ABNORMAL 10*6/UL
RBC UA: NORMAL /HPF (ref 0–4)
RENAL EPITHELIAL, UA: NORMAL /HPF
SEG NEUTROPHILS: 59 % (ref 36–65)
SEGMENTED NEUTROPHILS ABSOLUTE COUNT: 4.58 K/UL (ref 1.5–8.1)
SODIUM BLD-SCNC: 124 MMOL/L (ref 135–144)
SPECIFIC GRAVITY UA: 1.01 (ref 1.01–1.02)
TOTAL PROTEIN: 7.1 G/DL (ref 6.4–8.3)
TRICHOMONAS: NORMAL
TROPONIN INTERP: ABNORMAL
TROPONIN INTERP: ABNORMAL
TROPONIN T: ABNORMAL NG/ML
TROPONIN T: ABNORMAL NG/ML
TROPONIN, HIGH SENSITIVITY: 34 NG/L (ref 0–14)
TROPONIN, HIGH SENSITIVITY: 38 NG/L (ref 0–14)
TURBIDITY: ABNORMAL
URINE HGB: NEGATIVE
UROBILINOGEN, URINE: NORMAL
WBC # BLD: 7.7 K/UL (ref 3.5–11.3)
WBC # BLD: ABNORMAL 10*3/UL
WBC UA: NORMAL /HPF (ref 0–4)
YEAST: NORMAL

## 2020-10-24 PROCEDURE — 1036F TOBACCO NON-USER: CPT | Performed by: FAMILY MEDICINE

## 2020-10-24 PROCEDURE — 6370000000 HC RX 637 (ALT 250 FOR IP): Performed by: FAMILY MEDICINE

## 2020-10-24 PROCEDURE — 83690 ASSAY OF LIPASE: CPT

## 2020-10-24 PROCEDURE — G0378 HOSPITAL OBSERVATION PER HR: HCPCS

## 2020-10-24 PROCEDURE — G8419 CALC BMI OUT NRM PARAM NOF/U: HCPCS | Performed by: FAMILY MEDICINE

## 2020-10-24 PROCEDURE — 93005 ELECTROCARDIOGRAM TRACING: CPT | Performed by: FAMILY MEDICINE

## 2020-10-24 PROCEDURE — 1123F ACP DISCUSS/DSCN MKR DOCD: CPT | Performed by: FAMILY MEDICINE

## 2020-10-24 PROCEDURE — 6370000000 HC RX 637 (ALT 250 FOR IP): Performed by: EMERGENCY MEDICINE

## 2020-10-24 PROCEDURE — 99212 OFFICE O/P EST SF 10 MIN: CPT

## 2020-10-24 PROCEDURE — 4040F PNEUMOC VAC/ADMIN/RCVD: CPT | Performed by: FAMILY MEDICINE

## 2020-10-24 PROCEDURE — G8427 DOCREV CUR MEDS BY ELIG CLIN: HCPCS | Performed by: FAMILY MEDICINE

## 2020-10-24 PROCEDURE — 6360000002 HC RX W HCPCS: Performed by: FAMILY MEDICINE

## 2020-10-24 PROCEDURE — 84484 ASSAY OF TROPONIN QUANT: CPT

## 2020-10-24 PROCEDURE — 96360 HYDRATION IV INFUSION INIT: CPT

## 2020-10-24 PROCEDURE — 83605 ASSAY OF LACTIC ACID: CPT

## 2020-10-24 PROCEDURE — 96372 THER/PROPH/DIAG INJ SC/IM: CPT

## 2020-10-24 PROCEDURE — 2580000003 HC RX 258: Performed by: FAMILY MEDICINE

## 2020-10-24 PROCEDURE — 6360000004 HC RX CONTRAST MEDICATION: Performed by: EMERGENCY MEDICINE

## 2020-10-24 PROCEDURE — 74177 CT ABD & PELVIS W/CONTRAST: CPT

## 2020-10-24 PROCEDURE — 80053 COMPREHEN METABOLIC PANEL: CPT

## 2020-10-24 PROCEDURE — 82150 ASSAY OF AMYLASE: CPT

## 2020-10-24 PROCEDURE — 93010 ELECTROCARDIOGRAM REPORT: CPT | Performed by: FAMILY MEDICINE

## 2020-10-24 PROCEDURE — 2580000003 HC RX 258: Performed by: EMERGENCY MEDICINE

## 2020-10-24 PROCEDURE — 36415 COLL VENOUS BLD VENIPUNCTURE: CPT

## 2020-10-24 PROCEDURE — 99214 OFFICE O/P EST MOD 30 MIN: CPT | Performed by: FAMILY MEDICINE

## 2020-10-24 PROCEDURE — 85025 COMPLETE CBC W/AUTO DIFF WBC: CPT

## 2020-10-24 PROCEDURE — 99285 EMERGENCY DEPT VISIT HI MDM: CPT

## 2020-10-24 PROCEDURE — 96361 HYDRATE IV INFUSION ADD-ON: CPT

## 2020-10-24 PROCEDURE — G8484 FLU IMMUNIZE NO ADMIN: HCPCS | Performed by: FAMILY MEDICINE

## 2020-10-24 PROCEDURE — 93005 ELECTROCARDIOGRAM TRACING: CPT

## 2020-10-24 PROCEDURE — 81001 URINALYSIS AUTO W/SCOPE: CPT

## 2020-10-24 PROCEDURE — 1090F PRES/ABSN URINE INCON ASSESS: CPT | Performed by: FAMILY MEDICINE

## 2020-10-24 RX ORDER — HYDRALAZINE HYDROCHLORIDE 25 MG/1
25 TABLET, FILM COATED ORAL 2 TIMES DAILY
Status: DISCONTINUED | OUTPATIENT
Start: 2020-10-24 | End: 2020-10-25 | Stop reason: HOSPADM

## 2020-10-24 RX ORDER — ALPRAZOLAM 0.25 MG/1
0.25 TABLET ORAL ONCE
Status: COMPLETED | OUTPATIENT
Start: 2020-10-24 | End: 2020-10-24

## 2020-10-24 RX ORDER — PROMETHAZINE HYDROCHLORIDE 25 MG/1
12.5 TABLET ORAL EVERY 6 HOURS PRN
Status: DISCONTINUED | OUTPATIENT
Start: 2020-10-24 | End: 2020-10-25 | Stop reason: HOSPADM

## 2020-10-24 RX ORDER — EZETIMIBE 10 MG/1
10 TABLET ORAL DAILY
Status: DISCONTINUED | OUTPATIENT
Start: 2020-10-24 | End: 2020-10-25 | Stop reason: HOSPADM

## 2020-10-24 RX ORDER — MIRTAZAPINE 15 MG/1
15 TABLET, FILM COATED ORAL NIGHTLY
Status: DISCONTINUED | OUTPATIENT
Start: 2020-10-24 | End: 2020-10-25 | Stop reason: HOSPADM

## 2020-10-24 RX ORDER — HYDROCHLOROTHIAZIDE 25 MG/1
50 TABLET ORAL DAILY
Status: DISCONTINUED | OUTPATIENT
Start: 2020-10-24 | End: 2020-10-25

## 2020-10-24 RX ORDER — LISINOPRIL 20 MG/1
20 TABLET ORAL DAILY
Status: DISCONTINUED | OUTPATIENT
Start: 2020-10-24 | End: 2020-10-25 | Stop reason: HOSPADM

## 2020-10-24 RX ORDER — POLYETHYLENE GLYCOL 3350 17 G/17G
17 POWDER, FOR SOLUTION ORAL DAILY
COMMUNITY

## 2020-10-24 RX ORDER — ONDANSETRON 2 MG/ML
4 INJECTION INTRAMUSCULAR; INTRAVENOUS EVERY 6 HOURS PRN
Status: DISCONTINUED | OUTPATIENT
Start: 2020-10-24 | End: 2020-10-25 | Stop reason: HOSPADM

## 2020-10-24 RX ORDER — CARVEDILOL 3.12 MG/1
6.25 TABLET ORAL 2 TIMES DAILY WITH MEALS
Status: DISCONTINUED | OUTPATIENT
Start: 2020-10-24 | End: 2020-10-25 | Stop reason: HOSPADM

## 2020-10-24 RX ORDER — ASPIRIN 81 MG/1
81 TABLET, CHEWABLE ORAL DAILY
Status: DISCONTINUED | OUTPATIENT
Start: 2020-10-24 | End: 2020-10-25 | Stop reason: HOSPADM

## 2020-10-24 RX ORDER — ROPINIROLE 0.25 MG/1
0.25 TABLET, FILM COATED ORAL NIGHTLY
Status: DISCONTINUED | OUTPATIENT
Start: 2020-10-24 | End: 2020-10-25 | Stop reason: HOSPADM

## 2020-10-24 RX ORDER — 0.9 % SODIUM CHLORIDE 0.9 %
1000 INTRAVENOUS SOLUTION INTRAVENOUS ONCE
Status: COMPLETED | OUTPATIENT
Start: 2020-10-24 | End: 2020-10-24

## 2020-10-24 RX ORDER — POLYETHYLENE GLYCOL 3350 17 G/17G
17 POWDER, FOR SOLUTION ORAL DAILY
Status: DISCONTINUED | OUTPATIENT
Start: 2020-10-24 | End: 2020-10-25 | Stop reason: HOSPADM

## 2020-10-24 RX ORDER — GABAPENTIN 300 MG/1
600 CAPSULE ORAL 2 TIMES DAILY
Status: DISCONTINUED | OUTPATIENT
Start: 2020-10-24 | End: 2020-10-25 | Stop reason: HOSPADM

## 2020-10-24 RX ORDER — FAMOTIDINE 20 MG/1
20 TABLET, FILM COATED ORAL 2 TIMES DAILY
Status: DISCONTINUED | OUTPATIENT
Start: 2020-10-24 | End: 2020-10-25 | Stop reason: HOSPADM

## 2020-10-24 RX ORDER — ACETAMINOPHEN 325 MG/1
650 TABLET ORAL EVERY 6 HOURS PRN
Status: DISCONTINUED | OUTPATIENT
Start: 2020-10-24 | End: 2020-10-25 | Stop reason: HOSPADM

## 2020-10-24 RX ORDER — SODIUM CHLORIDE 9 MG/ML
INJECTION, SOLUTION INTRAVENOUS CONTINUOUS
Status: DISCONTINUED | OUTPATIENT
Start: 2020-10-24 | End: 2020-10-25 | Stop reason: HOSPADM

## 2020-10-24 RX ORDER — ACETAMINOPHEN 650 MG/1
650 SUPPOSITORY RECTAL EVERY 6 HOURS PRN
Status: DISCONTINUED | OUTPATIENT
Start: 2020-10-24 | End: 2020-10-25 | Stop reason: HOSPADM

## 2020-10-24 RX ORDER — LACTULOSE 10 G/15ML
10 SOLUTION ORAL EVERY EVENING
Status: DISCONTINUED | OUTPATIENT
Start: 2020-10-24 | End: 2020-10-25 | Stop reason: HOSPADM

## 2020-10-24 RX ORDER — OXYCODONE HYDROCHLORIDE AND ACETAMINOPHEN 5; 325 MG/1; MG/1
1 TABLET ORAL EVERY 6 HOURS PRN
Status: DISCONTINUED | OUTPATIENT
Start: 2020-10-24 | End: 2020-10-25 | Stop reason: HOSPADM

## 2020-10-24 RX ORDER — AMLODIPINE BESYLATE 2.5 MG/1
2.5 TABLET ORAL DAILY
Status: DISCONTINUED | OUTPATIENT
Start: 2020-10-24 | End: 2020-10-25 | Stop reason: HOSPADM

## 2020-10-24 RX ORDER — SENNA PLUS 8.6 MG/1
1 TABLET ORAL 2 TIMES DAILY PRN
Status: DISCONTINUED | OUTPATIENT
Start: 2020-10-24 | End: 2020-10-25 | Stop reason: HOSPADM

## 2020-10-24 RX ADMIN — FAMOTIDINE 20 MG: 20 TABLET, FILM COATED ORAL at 20:38

## 2020-10-24 RX ADMIN — MIRTAZAPINE 15 MG: 15 TABLET, FILM COATED ORAL at 20:38

## 2020-10-24 RX ADMIN — SODIUM CHLORIDE: 9 INJECTION, SOLUTION INTRAVENOUS at 18:05

## 2020-10-24 RX ADMIN — LACTULOSE 10 G: 20 SOLUTION ORAL at 20:39

## 2020-10-24 RX ADMIN — HYDRALAZINE HYDROCHLORIDE 25 MG: 25 TABLET, FILM COATED ORAL at 20:38

## 2020-10-24 RX ADMIN — SODIUM CHLORIDE 1000 ML: 9 INJECTION, SOLUTION INTRAVENOUS at 11:33

## 2020-10-24 RX ADMIN — ENOXAPARIN SODIUM 40 MG: 40 INJECTION SUBCUTANEOUS at 20:44

## 2020-10-24 RX ADMIN — ALPRAZOLAM 0.25 MG: 0.25 TABLET ORAL at 13:16

## 2020-10-24 RX ADMIN — IOPAMIDOL 100 ML: 755 INJECTION, SOLUTION INTRAVENOUS at 12:31

## 2020-10-24 RX ADMIN — GABAPENTIN 600 MG: 300 CAPSULE ORAL at 20:38

## 2020-10-24 RX ADMIN — ROPINIROLE HYDROCHLORIDE 0.25 MG: 0.25 TABLET, FILM COATED ORAL at 20:38

## 2020-10-24 RX ADMIN — OXYCODONE HYDROCHLORIDE AND ACETAMINOPHEN 1 TABLET: 5; 325 TABLET ORAL at 20:38

## 2020-10-24 RX ADMIN — CARVEDILOL 6.25 MG: 3.12 TABLET, FILM COATED ORAL at 20:38

## 2020-10-24 ASSESSMENT — ENCOUNTER SYMPTOMS
CONSTIPATION: 0
DIARRHEA: 0
DIARRHEA: 1
SHORTNESS OF BREATH: 0
CHEST TIGHTNESS: 0
BLOOD IN STOOL: 0
WHEEZING: 0
ABDOMINAL PAIN: 1
NAUSEA: 1
ABDOMINAL PAIN: 1
COUGH: 0
BACK PAIN: 1

## 2020-10-24 ASSESSMENT — PAIN SCALES - GENERAL
PAINLEVEL_OUTOF10: 9
PAINLEVEL_OUTOF10: 4

## 2020-10-24 NOTE — ED NOTES
[facility] aware waiting on bed assignement states will call back with bed assignment.  Patient informed     Apollo Tai RN  10/24/20 [id number]

## 2020-10-24 NOTE — ED NOTES
Patient requesting a \"nerve pill\" states she feels anxious. States \"I've taken them my whole life\" states her PCP and taken her off of them. Dr Milford Meigs informed.       Tracy Larson, RN  10/24/20 4715

## 2020-10-24 NOTE — PROGRESS NOTES
[address] [place] [address]  Dept: [phone]  Dept Fax: [phone]  Loc: [phone]    Anand Galvan is a 80 y.o. female who presents today for her medical conditions/complaints as noted below. Anand Galvan is c/o of   Chief Complaint   Patient presents with    Nausea     Nausea, anxious, & shakiness x1-2 weeks, and getting worse;  daughter at [employer].         HPI:     HPI here today for not feeling well. She has been jittery and weak for a week. She has been feeling very shaky for the past week or so. This has been coming and going. It is not worse at any specific time of day. She has not had a headache or anything. She is very weak to the point that she has moved in with her daughter. She is not falling. She has been feeling nauseated but she is not vomiting. She was seen by me 2 weeks ago for abdominal pain and she was found to be constipated on CT so I had started her on miralax bid. She is taking miralax twice a day which is helping her bowels move. She still has some abdominal pain. She is not sleeping well. She can't get her mind to settle down at night. She tried taking buspar earlier this week and that made her feel more jittery. Her appetite is terrible and she has lost 10 pounds in a month. Her daughter and granddaughter are sort of making her eat. She has been staying with her daughter for the past week and they are going to make it a permeant thing. She is using her walker all the time which she has not needed regularly in the past. No leg pain. She has been trying to cut down down on her percocet. She is still using it 2-3 times a day, but she was previously taking it 4 times a day regularly. She has been trying to cut back on her percocet for about a week and she did this by her own choice. She has not had any blurry vision.  She is not having any issues with chest pain or shortness of breath. No palpitations. 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 Intended supply: 30 days 120 tablet 0    senna (SENOKOT) 8.6 MG tablet Take 1 tablet by mouth 2 times daily as needed for Constipation 60 tablet 2    gabapentin (NEURONTIN) 300 MG capsule Take 2 capsules by mouth 2 times daily for 90 days. 360 capsule 0    lisinopril (PRINIVIL;ZESTRIL) 20 MG tablet Take 1 tablet by mouth daily 90 tablet 1    hydroCHLOROthiazide (HYDRODIURIL) 50 MG tablet Take 1 tablet by mouth daily 90 tablet 1    naloxegol (MOVANTIK) 25 MG TABS tablet Take 1 tablet by mouth every morning 30 tablet 5    lubiprostone (AMITIZA) 24 MCG capsule Take 1 capsule by mouth 2 times daily (with meals) 60 capsule 3    diclofenac sodium (VOLTAREN) 1 % GEL Apply 2 g topically 4 times daily 2 Tube 3    lactulose (CHRONULAC) 10 GM/15ML solution Take 15 mLs by mouth every evening 150 mL 1    blood glucose test strips (ASCENSIA AUTODISC VI;ONE TOUCH ULTRA TEST VI) strip 1 each by In Vitro route daily As needed. 100 each 1    Lancets MISC Test sugars once daily.   R73.03 100 each 1    amLODIPine (NORVASC) 2.5 MG tablet Take 1 tablet by mouth daily 90 tablet 1    carvedilol (COREG) 3.125 MG tablet Take 2 tablets by mouth 2 times daily (with meals) 360 tablet 3    hydrALAZINE (APRESOLINE) 25 MG tablet Take 1 tablet by mouth 2 times daily 180 tablet 2    ezetimibe (Michelle Rule) NEGATIVE  NEGATIVE Final    Urinalysis Comments 10/24/2020 NOT REPORTED   Final    - 10/24/2020        Final    WBC, UA 10/24/2020 None  0 - 4 /HPF Final    RBC, UA 10/24/2020 None  0 - 4 /HPF Final    Casts UA 10/24/2020 NOT REPORTED  0 - 2 /LPF Final    Crystals, UA 10/24/2020 NOT REPORTED  None /HPF Final    Epithelial Cells UA 10/24/2020 0 TO 4  0 - 5 /HPF Final    Renal Epithelial, UA 10/24/2020 NOT REPORTED  0 /HPF Final    Bacteria, UA 10/24/2020 None  None Final    Mucus, UA 10/24/2020 NOT REPORTED  None Final    Trichomonas, UA 10/24/2020 NOT REPORTED  None Final    Amorphous, UA 10/24/2020 NOT REPORTED  None Final    Other Observations UA 10/24/2020 NOT REPORTED  NOT REQ.  Final    Yeast, UA 10/24/2020 NOT REPORTED  None Final    Lipase 10/24/2020 54  13 - 60 U/L Final    Amylase 10/24/2020 68  28 - 100 U/L Final    WBC 10/24/2020 7.7  3.5 - 11.3 k/uL Final    RBC 10/24/2020 3.93* 3.95 - 5.11 m/uL Final    Hemoglobin 10/24/2020 11.8* 11.9 - 15.1 g/dL Final    Hematocrit 10/24/2020 36.7  36.3 - 47.1 % Final    MCV 10/24/2020 93.4  82.6 - 102.9 fL Final    MCH 10/24/2020 30.0  25.2 - 33.5 pg Final    MCHC 10/24/2020 32.2  25.2 - 33.5 g/dL Final    RDW 10/24/2020 12.9  11.8 - 14.4 % Final    Platelets 05/55/2868 287  138 - 453 k/uL Final    MPV 10/24/2020 9.7  8.1 - 13.5 fL Final    NRBC Automated 10/24/2020 0.0  0.0 per 100 WBC Final    Differential Type 10/24/2020 NOT REPORTED   Final    Seg Neutrophils 10/24/2020 59  36 - 65 % Final    Lymphocytes 10/24/2020 27  24 - 43 % Final    Monocytes 10/24/2020 12  3 - 12 % Final    Eosinophils % 10/24/2020 1  1 - 4 % Final    Basophils 10/24/2020 1  0 - 2 % Final    Immature Granulocytes 10/24/2020 0  0 % Final    Segs Absolute 10/24/2020 4.58  1.50 - 8.10 k/uL Final    Absolute Lymph # 10/24/2020 2.07  1.10 - 3.70 k/uL Final    Absolute Mono # 10/24/2020 0.91  0.10 - 1.20 k/uL Final    Absolute Eos # 10/24/2020 0.04  0.00 - 0.44 k/uL Final    Basophils Absolute 10/24/2020 0.04  0.00 - 0.20 k/uL Final    Absolute Immature Granulocyte 10/24/2020 <0.03  0.00 - 0.30 k/uL Final    WBC Morphology 10/24/2020 NOT REPORTED   Final    RBC Morphology 10/24/2020 NOT REPORTED   Final    Platelet Estimate 76/59/8600 NOT REPORTED   Final    Glucose 10/24/2020 147* 70 - 99 mg/dL Final    BUN 10/24/2020 19  8 - 23 mg/dL Final    CREATININE 10/24/2020 0.80  0.50 - 0.90 mg/dL Final    Bun/Cre Ratio 10/24/2020 24* 9 - 20 Final    Calcium 10/24/2020 9.5  8.6 - 10.4 mg/dL Final    Sodium 10/24/2020 124* 135 - 144 mmol/L Final    Potassium 10/24/2020 4.8  3.7 - 5.3 mmol/L Final    Chloride 10/24/2020 87* 98 - 107 mmol/L Final    CO2 10/24/2020 26  20 - 31 mmol/L Final    Anion Gap 10/24/2020 11  9 - 17 mmol/L Final    Alkaline Phosphatase 10/24/2020 70  35 - 104 U/L Final    ALT 10/24/2020 12  5 - 33 U/L Final    AST 10/24/2020 21  <32 U/L Final    Total Bilirubin 10/24/2020 0.40  0.3 - 1.2 mg/dL Final    Total Protein 10/24/2020 7.1  6.4 - 8.3 g/dL Final    Alb 10/24/2020 4.3  3.5 - 5.2 g/dL Final    Albumin/Globulin Ratio 10/24/2020 1.5  1.0 - 2.5 Final    GFR Non- 10/24/2020 >60  >60 mL/min Final    GFR  10/24/2020 >60  >60 mL/min Final    GFR Comment 10/24/2020        Final    GFR Staging 10/24/2020 NOT REPORTED   Final            Plan:        Hyponatremia and abdominal pain: new; I did labs to look for electrolyte disturbance, liver or kidney failure, pancreatitis, anemia, elevated blood sugar, and UTI. Her labs only showed a sodium of 124 which is new for her. This could be the cause of her weakness and shakiness. Because of her hyponatremia I felt she needed at least IVF and possibly admitted so I transferred her to the ER    Anxiety: worsening; possibly due to withdrawal symptoms from the percocet since she has decreased her dose over the past week or two. Her EKG was unchanged from last year. Return if symptoms worsen or fail to improve. Orders Placed This Encounter   Procedures    Urinalysis Reflex to Culture     Standing Status:   Future     Number of Occurrences:   1     Standing Expiration Date:   10/24/2021     Order Specific Question:   SPECIFY(EX-CATH,MIDSTREAM,CYSTO,ETC)? Answer:   midstream    Comprehensive Metabolic Panel     Standing Status:   Future     Number of Occurrences:   1     Standing Expiration Date:   10/24/2021    CBC Auto Differential     Standing Status:   Future     Number of Occurrences:   1     Standing Expiration Date:   10/24/2021    Amylase     Standing Status:   Future     Number of Occurrences:   1     Standing Expiration Date:   10/24/2021    Lipase     Standing Status:   Future     Number of Occurrences:   1     Standing Expiration Date:   10/24/2021    EKG 12 Lead     Standing Status:   Future     Number of Occurrences:   1     Standing Expiration Date:   10/24/2021     Order Specific Question:   Reason for Exam?     Answer: Anxiety         Patientgiven educational materials - see patient instructions. Discussed use, benefit,and side effects of prescribed medications. All patient questions answered. Ptvoiced understanding. Reviewed health maintenance. Instructed to continue currentmedications, diet and exercise. Patient agreed with treatment plan. Follow up asdirected.      Electronically signed by Gabi Chau MD on 10/24/2020 at 11:11 AM

## 2020-10-24 NOTE — ED PROVIDER NOTES
[facility] ED  [address]  [place] [address]  Phone: [phone]        Pt Name: Anuj Diaz  MRN: [id number]  Sumeetgflaury 8/16/1928  Date of evaluation: 10/24/20      CHIEF COMPLAINT     Chief Complaint   Patient presents with    Other     feels nervous x 1 1/2 wks         HISTORY OF PRESENT ILLNESS  (Location/Symptom, Timing/Onset, Context/Setting, Quality, Duration, Modifying Factors, Severity.)    Anuj Diaz is a 80 y.o. female who presents with a low sodium. This 59-year-old female suffers from chronic abdominal pain constipation was recently placed on MiraLAX now is having loose stools followed up with her family doctor and was found to have a low sodium so sent to the ER for evaluation the patient states she continues to have some abdominal discomfort and she feels jittery she denies any chest pain or shortness of breath no headache no fever no chills no blood in her stool no vomiting nothing makes her symptoms better or worse      REVIEW OF SYSTEMS    (2-9 systems for level 4, 10 or more for level 5)     Review of Systems   Gastrointestinal: Positive for abdominal pain and diarrhea. All other systems reviewed and are negative. 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 SURGICAL HISTORY      has a past surgical history that includes Lumbar disc surgery (2012) and Appendectomy (1948). CURRENTMEDICATIONS       Previous Medications    AMLODIPINE (NORVASC) 2.5 MG TABLET    Take 1 tablet by mouth daily    ASPIRIN 81 MG CHEWABLE TABLET    Take 81 mg by mouth    BLOOD GLUCOSE TEST STRIPS (ASCENSIA AUTODISC VI;ONE TOUCH ULTRA TEST VI) STRIP    1 each by In Vitro route daily As needed.     CARVEDILOL (COREG) 3.125 MG TABLET    Take 2 tablets by mouth 2 times daily (with meals)    DICLOFENAC SODIUM (VOLTAREN) 1 % GEL    Apply 2 g topically 4 times daily    EZETIMIBE (ZETIA) 10 MG TABLET    TAKE 1 TABLET DAILY    GABAPENTIN (NEURONTIN) 300 MG CAPSULE    Take 2 capsules by mouth 2 times daily for 90 days. HYDRALAZINE (APRESOLINE) 25 MG TABLET    Take 1 tablet by mouth 2 times daily    HYDROCHLOROTHIAZIDE (HYDRODIURIL) 50 MG TABLET    Take 1 tablet by mouth daily    LACTULOSE (CHRONULAC) 10 GM/15ML SOLUTION    Take 15 mLs by mouth every evening    LANCETS MISC    Test sugars once daily. R73.03    LISINOPRIL (PRINIVIL;ZESTRIL) 20 MG TABLET    Take 1 tablet by mouth daily    LUBIPROSTONE (AMITIZA) 24 MCG CAPSULE    Take 1 capsule by mouth 2 times daily (with meals)    MIRTAZAPINE (REMERON) 15 MG TABLET    TAKE 1 TABLET NIGHTLY    NALOXEGOL (MOVANTIK) 25 MG TABS TABLET    Take 1 tablet by mouth every morning    OXYCODONE-ACETAMINOPHEN (PERCOCET) 7.5-325 MG PER TABLET    Take 1 tablet by mouth every 6 hours as needed for Pain for up to 30 days. Intended supply: 30 days    POLYETHYLENE GLYCOL (GLYCOLAX) 17 G PACKET    Take 17 g by mouth daily    ROPINIROLE (REQUIP) 0.25 MG TABLET    TAKE 1 TABLET BY MOUTH NIGHTLY    SENNA (SENOKOT) 8.6 MG TABLET    Take 1 tablet by mouth 2 times daily as needed for Constipation       ALLERGIES     is allergic to atorvastatin; codeine; diltiazem hcl; levofloxacin; pregabalin; and simvastatin. FAMILY HISTORY     has no family status information on file. family history is not on file. SOCIAL HISTORY      reports that she has never smoked. She has never used smokeless tobacco. She reports that she does not drink alcohol or use drugs. PHYSICAL EXAM    (up to 7 for level 4, 8 or more for level 5)   INITIAL VITALS:  height is 5' 1\" (1.549 m) and weight is 95 lb (43.1 kg). Her temporal temperature is 97.9 °F (36.6 °C). Her blood pressure is 214/60 (abnormal) and her pulse is 67. Her respiration is 15 and oxygen saturation is 96%.       Physical Exam  Vitals signs and nursing note reviewed. Constitutional:       Appearance: Normal appearance. HENT:      Head: Normocephalic and atraumatic. Eyes:      Conjunctiva/sclera: Conjunctivae normal.   Neck:      Musculoskeletal: Normal range of motion and neck supple. No neck rigidity or muscular tenderness. Cardiovascular:      Rate and Rhythm: Normal rate and regular rhythm. Pulses: Normal pulses. Heart sounds: Murmur present. Comments: Patient does have a soft systolic ejection murmur which she states is known  Pulmonary:      Effort: Pulmonary effort is normal. No respiratory distress. Breath sounds: Normal breath sounds. No stridor. No wheezing, rhonchi or rales. Abdominal:      General: Bowel sounds are normal. There is no distension. Palpations: Abdomen is soft. There is no mass. Tenderness: There is abdominal tenderness. There is no right CVA tenderness, left CVA tenderness, guarding or rebound. Comments: Mild tenderness to palpation in the umbilical and right lateral abdomen no overt McBurney's tenderness no overt peritoneal findings   Musculoskeletal: Normal range of motion. General: No swelling or tenderness. Lymphadenopathy:      Cervical: No cervical adenopathy. Skin:     General: Skin is warm and dry. Findings: No rash. Neurological:      General: No focal deficit present. Mental Status: She is alert.          DIFFERENTIAL DIAGNOSIS/ MDM:     Patient was sent from her primary care office for evaluation of chronic abdominal pain had labs that revealed hyponatremia with a sodium of 124 previous CT showed the patient to have constipation in reviewing her record the patient is on opioids  I will go ahead because of the abdominal pain I will check an EKG troponin lactic acid and a CT of the abdomen and pelvis I will also give the patient some normal saline    DIAGNOSTIC RESULTS     EKG: All EKG's are interpreted by the Emergency Department Physician who either signs or Co-signs this chart in the [address] a cardiologist.      Interpreted by Daryle Makua, MD     Rhythm: normal sinus   Rate:66  Axis: 99  Ectopy: none  Conduction: Right bundle branch block which the patient has had previously  ST Segments: no acute change  T Waves: no acute change  Q Waves: none    Clinical Impression: normal sinus rhythm with no acute changes/normal EKG. No acute infarction/ischemia noted. Interpreted by Daryle Makua, MD     Rhythm: normal sinus   Rate: 69  Axis: 80  Ectopy: none  Conduction: Right bundle branch block  ST Segments: no acute change  T Waves: no acute change  Q Waves: none    Clinical Impression: normal sinus rhythm with no acute changes/normal EKG. No acute infarction/ischemia noted. RADIOLOGY:  Non-plain film images such as CT, Ultrasound and MRI are read by the radiologist. Rudy Will radiographic images are visualized and the radiologist interpretations are reviewed as follows:       Ct Abdomen Pelvis W Iv Contrast    Result Date: 10/24/2020  Large stool volume. No bowel obstruction. No acute inflammatory process. Common bile duct and pancreatic duct are again dilated. MRCP or ERCP could be helpful for further evaluation. Ct Abdomen Pelvis W Iv Contrast Additional Contrast? Oral    Result Date: 10/8/2020  EXAMINATION: CT OF THE ABDOMEN AND PELVIS WITH CONTRAST 10/8/2020 10:26 am TECHNIQUE: CT of the abdomen and pelvis was performed with the administration of intravenous contrast. Multiplanar reformatted images are provided for review. Dose modulation, iterative reconstruction, and/or weight based adjustment of the mA/kV was utilized to reduce the radiation dose to as low as reasonably achievable. COMPARISON: None HISTORY: ORDERING SYSTEM PROVIDED HISTORY: [facility] abdominal pain TECHNOLOGIST PROVIDED HISTORY: concern for diveriticulitis FINDINGS: Lower Chest: Moderate hiatal hernia. No pleural effusions. Organs: No liver lesion. No gallstones. Common bile duct measures 10 mm. No discrete filling defect. Pancreatic duct within the head of the pancreas is minimally dilated. No discrete pancreatic lesion. Overall pancreas is atrophied. No splenomegaly. No adrenal lesion. No hydronephrosis. No renal lesion. GI/Bowel: Moderate hiatal hernia. No bowel obstruction. No acute inflammatory process. Large stool volume. Pelvis: No free fluid. No bladder calculus. Peritoneum/Retroperitoneum: Atherosclerotic calcification of the abdominal aorta without aneurysmal dilatation. No adenopathy. Bones/Soft Tissues: No acute soft tissue abnormality. Lumbar spine degenerative changes. Large stool volume. No bowel obstruction. No acute inflammatory process. Dilated common bile duct with slightly dilated pancreatic duct. Given age findings are nonspecific. If clinically indicated MRCP with contrast can be obtained. Interpretation per the Radiologist below, if available at the time of this note:      Ct Abdomen Pelvis W Iv Contrast Additional Contrast? Oral    Result Date: 10/8/2020  EXAMINATION: CT OF THE ABDOMEN AND PELVIS WITH CONTRAST 10/8/2020 10:26 am TECHNIQUE: CT of the abdomen and pelvis was performed with the administration of intravenous contrast. Multiplanar reformatted images are provided for review. Dose modulation, iterative reconstruction, and/or weight based adjustment of the mA/kV was utilized to reduce the radiation dose to as low as reasonably achievable. COMPARISON: None HISTORY: ORDERING SYSTEM PROVIDED HISTORY: [facility] abdominal pain TECHNOLOGIST PROVIDED HISTORY: concern for diveriticulitis FINDINGS: Lower Chest: Moderate hiatal hernia. No pleural effusions. Organs: No liver lesion. No gallstones. Common bile duct measures 10 mm. No discrete filling defect. Pancreatic duct within the head of the pancreas is minimally dilated. No discrete pancreatic lesion. Overall pancreas is atrophied. No splenomegaly. No adrenal lesion.   No hydronephrosis. No renal lesion. GI/Bowel: Moderate hiatal hernia. No bowel obstruction. No acute inflammatory process. Large stool volume. Pelvis: No free fluid. No bladder calculus. Peritoneum/Retroperitoneum: Atherosclerotic calcification of the abdominal aorta without aneurysmal dilatation. No adenopathy. Bones/Soft Tissues: No acute soft tissue abnormality. Lumbar spine degenerative changes. Large stool volume. No bowel obstruction. No acute inflammatory process. Dilated common bile duct with slightly dilated pancreatic duct. Given age findings are nonspecific. If clinically indicated MRCP with contrast can be obtained.        LABS:  Results for orders placed or performed during the hospital encounter of 10/24/20   Troponin   Result Value Ref Range    Troponin, High Sensitivity 38 (H) 0 - 14 ng/L    Troponin T NOT REPORTED <0.03 ng/mL    Troponin Interp NOT REPORTED    Lactate, Sepsis   Result Value Ref Range    Lactic Acid, Sepsis 0.7 0.5 - 1.9 mmol/L    Lactic Acid, Sepsis, Whole Blood NOT REPORTED 0.5 - 1.9 mmol/L   Troponin   Result Value Ref Range    Troponin, High Sensitivity 34 (H) 0 - 14 ng/L    Troponin T NOT REPORTED <0.03 ng/mL    Troponin Interp NOT REPORTED    EKG 12 Lead   Result Value Ref Range    Ventricular Rate 69 BPM    Atrial Rate 69 BPM    P-R Interval 168 ms    QRS Duration 122 ms    Q-T Interval 440 ms    QTc Calculation (Bazett) 471 ms    P Axis 66 degrees    R Axis 80 degrees    T Axis 12 degrees           EMERGENCY DEPARTMENT COURSE:   Vitals:    Vitals:    10/24/20 1055 10/24/20 1200 10/24/20 1300 10/24/20 1338   BP: (!) 223/65 (!) 188/42 (!) 221/72 (!) 214/60   Pulse: 71   67   Resp: 15      Temp: 97.9 °F (36.6 °C)      TempSrc: Temporal      SpO2: 97% 95% 96% 96%   Weight: 95 lb (43.1 kg)      Height: 5' 1\" (1.549 m)        -------------------------  BP: (!) 214/60, Temp: 97.9 °F (36.6 °C), Pulse: 67, Resp: 15      RE-EVALUATION:  Patient was sent from her family doctor's office for hyponatremia is being given normal saline is noted to have an elevation in her high-sensitivity troponin CT showing a large stool volume given the hyponatremia and the elevated troponin I do feel she warrants admission for IV hydration and serial enzymes    CONSULTS:  My hospitalist is kind enough to admit the patient to his service    PROCEDURES:  None    FINAL IMPRESSION      1. Hyponatremia    2. Elevated troponin          DISPOSITION/PLAN   DISPOSITION        CONDITION ON DISPOSITION:   Stable    PATIENT REFERRED TO:  No follow-up provider specified. DISCHARGE MEDICATIONS:  New Prescriptions    No medications on file       (Please note that portions of this note were completed with a voicerecognition program.  Efforts were made to edit the dictations but occasionally words are mis-transcribed.)    Hinds MD, F.A.C.E.P.   Attending Emergency Medicine Physician       Tong Gallegos MD  10/24/20 9012

## 2020-10-24 NOTE — ED NOTES
Patient sent from Dr Darren Shaw office. Patient seen 2 wks ago with abd pain CT and labs done at that time found to be constipated given Miralax to take daily. Seen back today in office with feeling jittery patient describes as feeling \"nervous\"   Patient states started 1 1/2 wk ago. EKG, U/A and Labs done in office today reports . Denies any increased stress at home.  has been trying to take less Percocet because she states \"I thought it would be better for me\" Has been taking for years. Patient appropriate during exam.  has been having loose stools since starting medication for constipation.       Cindy Ennis RN  10/24/20 3921

## 2020-10-25 VITALS
TEMPERATURE: 97.7 F | BODY MASS INDEX: 17.56 KG/M2 | SYSTOLIC BLOOD PRESSURE: 142 MMHG | HEART RATE: 63 BPM | OXYGEN SATURATION: 97 % | WEIGHT: 93 LBS | RESPIRATION RATE: 17 BRPM | HEIGHT: 61 IN | DIASTOLIC BLOOD PRESSURE: 49 MMHG

## 2020-10-25 LAB
ANION GAP SERPL CALCULATED.3IONS-SCNC: 8 MMOL/L (ref 9–17)
BUN BLDV-MCNC: 16 MG/DL (ref 8–23)
BUN/CREAT BLD: 19 (ref 9–20)
CALCIUM SERPL-MCNC: 8.7 MG/DL (ref 8.6–10.4)
CHLORIDE BLD-SCNC: 96 MMOL/L (ref 98–107)
CO2: 26 MMOL/L (ref 20–31)
CREAT SERPL-MCNC: 0.83 MG/DL (ref 0.5–0.9)
EKG ATRIAL RATE: 69 BPM
EKG P AXIS: 66 DEGREES
EKG P-R INTERVAL: 168 MS
EKG Q-T INTERVAL: 440 MS
EKG QRS DURATION: 122 MS
EKG QTC CALCULATION (BAZETT): 471 MS
EKG R AXIS: 80 DEGREES
EKG T AXIS: 12 DEGREES
EKG VENTRICULAR RATE: 69 BPM
GFR AFRICAN AMERICAN: >60 ML/MIN
GFR NON-AFRICAN AMERICAN: >60 ML/MIN
GFR SERPL CREATININE-BSD FRML MDRD: ABNORMAL ML/MIN/{1.73_M2}
GFR SERPL CREATININE-BSD FRML MDRD: ABNORMAL ML/MIN/{1.73_M2}
GLUCOSE BLD-MCNC: 91 MG/DL (ref 70–99)
HCT VFR BLD CALC: 30.6 % (ref 36.3–47.1)
HEMOGLOBIN: 9.5 G/DL (ref 11.9–15.1)
MCH RBC QN AUTO: 29.4 PG (ref 25.2–33.5)
MCHC RBC AUTO-ENTMCNC: 31 G/DL (ref 25.2–33.5)
MCV RBC AUTO: 94.7 FL (ref 82.6–102.9)
NRBC AUTOMATED: 0 PER 100 WBC
PDW BLD-RTO: 13.2 % (ref 11.8–14.4)
PLATELET # BLD: 239 K/UL (ref 138–453)
PMV BLD AUTO: 10 FL (ref 8.1–13.5)
POTASSIUM SERPL-SCNC: 4.2 MMOL/L (ref 3.7–5.3)
RBC # BLD: 3.23 M/UL (ref 3.95–5.11)
SODIUM BLD-SCNC: 130 MMOL/L (ref 135–144)
WBC # BLD: 6.3 K/UL (ref 3.5–11.3)

## 2020-10-25 PROCEDURE — 99219 PR INITIAL OBSERVATION CARE/DAY 50 MINUTES: CPT | Performed by: FAMILY MEDICINE

## 2020-10-25 PROCEDURE — G0378 HOSPITAL OBSERVATION PER HR: HCPCS

## 2020-10-25 PROCEDURE — 97161 PT EVAL LOW COMPLEX 20 MIN: CPT

## 2020-10-25 PROCEDURE — 94760 N-INVAS EAR/PLS OXIMETRY 1: CPT

## 2020-10-25 PROCEDURE — 85027 COMPLETE CBC AUTOMATED: CPT

## 2020-10-25 PROCEDURE — 36415 COLL VENOUS BLD VENIPUNCTURE: CPT

## 2020-10-25 PROCEDURE — 96372 THER/PROPH/DIAG INJ SC/IM: CPT

## 2020-10-25 PROCEDURE — 6370000000 HC RX 637 (ALT 250 FOR IP): Performed by: FAMILY MEDICINE

## 2020-10-25 PROCEDURE — 80048 BASIC METABOLIC PNL TOTAL CA: CPT

## 2020-10-25 PROCEDURE — 2580000003 HC RX 258: Performed by: FAMILY MEDICINE

## 2020-10-25 PROCEDURE — 6360000002 HC RX W HCPCS: Performed by: FAMILY MEDICINE

## 2020-10-25 RX ORDER — HYDROCHLOROTHIAZIDE 25 MG/1
25 TABLET ORAL DAILY
Status: DISCONTINUED | OUTPATIENT
Start: 2020-10-26 | End: 2020-10-25 | Stop reason: HOSPADM

## 2020-10-25 RX ORDER — LORAZEPAM 0.5 MG/1
0.5 TABLET ORAL ONCE
Status: COMPLETED | OUTPATIENT
Start: 2020-10-25 | End: 2020-10-25

## 2020-10-25 RX ORDER — LORAZEPAM 0.5 MG/1
0.5 TABLET ORAL EVERY 12 HOURS PRN
Qty: 15 TABLET | Refills: 0 | Status: SHIPPED | OUTPATIENT
Start: 2020-10-25 | End: 2020-11-01

## 2020-10-25 RX ORDER — SODIUM CHLORIDE 1000 MG
1 TABLET, SOLUBLE MISCELLANEOUS 2 TIMES DAILY WITH MEALS
Qty: 10 TABLET | Refills: 0 | Status: SHIPPED | OUTPATIENT
Start: 2020-10-25 | End: 2021-02-11 | Stop reason: SDUPTHER

## 2020-10-25 RX ORDER — SODIUM CHLORIDE 1000 MG
1 TABLET, SOLUBLE MISCELLANEOUS 2 TIMES DAILY WITH MEALS
Status: DISCONTINUED | OUTPATIENT
Start: 2020-10-25 | End: 2020-10-25 | Stop reason: HOSPADM

## 2020-10-25 RX ORDER — HYDROCHLOROTHIAZIDE 25 MG/1
25 TABLET ORAL DAILY
Qty: 30 TABLET | Refills: 3 | Status: SHIPPED | OUTPATIENT
Start: 2020-10-26 | End: 2021-01-06

## 2020-10-25 RX ADMIN — GABAPENTIN 600 MG: 300 CAPSULE ORAL at 10:00

## 2020-10-25 RX ADMIN — ASPIRIN 81 MG: 81 TABLET, CHEWABLE ORAL at 10:01

## 2020-10-25 RX ADMIN — CARVEDILOL 6.25 MG: 3.12 TABLET, FILM COATED ORAL at 10:14

## 2020-10-25 RX ADMIN — OXYCODONE HYDROCHLORIDE AND ACETAMINOPHEN 1 TABLET: 5; 325 TABLET ORAL at 10:02

## 2020-10-25 RX ADMIN — LISINOPRIL 20 MG: 20 TABLET ORAL at 10:01

## 2020-10-25 RX ADMIN — LORAZEPAM 0.5 MG: 0.5 TABLET ORAL at 13:23

## 2020-10-25 RX ADMIN — AMLODIPINE BESYLATE 2.5 MG: 2.5 TABLET ORAL at 10:01

## 2020-10-25 RX ADMIN — SODIUM CHLORIDE: 9 INJECTION, SOLUTION INTRAVENOUS at 06:46

## 2020-10-25 RX ADMIN — HYDROCHLOROTHIAZIDE 50 MG: 25 TABLET ORAL at 10:00

## 2020-10-25 RX ADMIN — FAMOTIDINE 20 MG: 20 TABLET, FILM COATED ORAL at 10:01

## 2020-10-25 RX ADMIN — ENOXAPARIN SODIUM 40 MG: 40 INJECTION SUBCUTANEOUS at 10:07

## 2020-10-25 RX ADMIN — EZETIMIBE 10 MG: 10 TABLET ORAL at 10:10

## 2020-10-25 RX ADMIN — POLYETHYLENE GLYCOL 3350 17 G: 17 POWDER, FOR SOLUTION ORAL at 10:00

## 2020-10-25 RX ADMIN — HYDRALAZINE HYDROCHLORIDE 25 MG: 25 TABLET, FILM COATED ORAL at 10:10

## 2020-10-25 ASSESSMENT — PAIN SCALES - GENERAL
PAINLEVEL_OUTOF10: 4
PAINLEVEL_OUTOF10: 0

## 2020-10-25 NOTE — PROGRESS NOTES
Physical Therapy    Facility/Department: [facility]  PROGRESSIVE CARE  Initial Assessment    NAME: Wang Ornelas  : 1928  MRN: [id number]    Date of Service: 10/25/2020    Discharge Recommendations:  Home with assist PRN        Assessment   Prognosis: Good  Decision Making: Low Complexity  REQUIRES PT FOLLOW UP: No  Activity Tolerance  Activity Tolerance: Patient Tolerated treatment well       Patient Diagnosis(es): The primary encounter diagnosis was Hyponatremia. A diagnosis of Elevated troponin was also pertinent to this visit. 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 has a past surgical history that includes Lumbar disc surgery () and Appendectomy (194).     Restrictions     Vision/Hearing  Vision: Within Functional Limits  Hearing: Within functional limits     Subjective  General  Chart Reviewed: Yes  Patient assessed for rehabilitation services?: Yes          Orientation  Orientation  Overall Orientation Status: Within Functional Limits  Social/Functional History  Social/Functional History  Lives With: Daughter  Type of Home: House  Home Layout: One level  Home Access: Stairs to enter with rails  Entrance Stairs - Number of Steps: 1-2  Entrance Stairs - Rails: Left  Bathroom Shower/Tub: Tub/Shower unit  Bathroom Toilet: Standard  Bathroom Equipment: Shower chair  Bathroom Accessibility: Accessible  Home Equipment: 4 wheeled walker  Receives Help From: Family  ADL Assistance: Independent  Homemaking Assistance: Independent  Homemaking Responsibilities: (Helps Daughter)  Ambulation Assistance: Independent  Transfer Assistance: Independent  Active : No  Cognition        Objective     Observation/Palpation  Posture: Fair    AROM RLE (degrees)  RLE AROM: WFL  AROM LLE (degrees)  LLE AROM : WFL  Strength RLE  Strength RLE: [facility]  Strength LLE  Strength LLE: WFL        Bed mobility  Bridging: Modified independent   Supine to Sit: Modified independent  Sit to Supine: Modified independent  Transfers  Sit to Stand: Modified independent  Stand to sit: Modified independent  Ambulation  Ambulation?: Yes  Ambulation 1  Surface: level tile  Device: Rolling Walker  Assistance: Modified Independent  Distance: 50'     Balance  Posture: Fair  Sitting - Static: Good  Sitting - Dynamic: Good  Standing - Static: Good  Standing - Dynamic: Fair;+        Plan   Safety Devices  Type of devices: Patient at risk for falls, Left in bed    G-Code       OutComes Score                                                  AM-PAC Score             Goals          Therapy Time   Individual Concurrent Group Co-treatment   Time In 0940         Time Out 0958         Minutes 9575 Singh Snow, PT

## 2020-10-25 NOTE — PROGRESS NOTES
Reviewed plan of care, and discharge instructions to patient and daughter. See discharge instructions. Discharge via to daughter via w/c. See discharge instructions.

## 2020-10-25 NOTE — PLAN OF CARE
Problem: Falls - Risk of:  Goal: Will remain free from falls  Description: Will remain free from falls  Outcome: Ongoing  Goal: Absence of physical injury  Description: Absence of physical injury  Outcome: Ongoing     Problem: Bowel/Gastric:  Goal: Ability to achieve a regular elimination pattern will improve  Description: Ability to achieve a regular elimination pattern will improve  Outcome: Ongoing  Goal: Occurrences of constipation will decrease  Description: Occurrences of constipation will decrease  Outcome: Ongoing     Problem: Nutritional:  Goal: Ability to follow a diet with enough fiber (20 to 30 grams) for normal bowel function will improve  Description: Ability to follow a diet with enough fiber (20 to 30 grams) for normal bowel function will improve  Outcome: Ongoing     Problem: Pain:  Description: Pain management should include both nonpharmacologic and pharmacologic interventions.   Goal: Pain level will decrease  Description: Pain level will decrease  Outcome: Ongoing  Goal: Control of acute pain  Description: Control of acute pain  Outcome: Ongoing  Goal: Control of chronic pain  Description: Control of chronic pain  Outcome: Ongoing     Problem: Fluid Volume:  Goal: Ability to achieve a balanced intake and output will improve  Description: Ability to achieve a balanced intake and output will improve  Outcome: Ongoing     Problem: Physical Regulation:  Goal: Ability to maintain clinical measurements within normal limits will improve  Description: Ability to maintain clinical measurements within normal limits will improve  Outcome: Ongoing  Goal: Will show no signs and symptoms of electrolyte imbalance  Description: Will show no signs and symptoms of electrolyte imbalance  Outcome: Ongoing

## 2020-10-25 NOTE — DISCHARGE INSTR - DIET

## 2020-10-25 NOTE — DISCHARGE SUMMARY
tablet  Take 1 tablet by mouth 2 times daily             hydroCHLOROthiazide (HYDRODIURIL) 25 MG tablet  Take 1 tablet by mouth daily             lactulose (CHRONULAC) 10 GM/15ML solution  Take 15 mLs by mouth every evening             Lancets MISC  Test sugars once daily. R73.03             lisinopril (PRINIVIL;ZESTRIL) 20 MG tablet  Take 1 tablet by mouth daily             LORazepam (ATIVAN) 0.5 MG tablet  Take 1 tablet by mouth every 12 hours as needed for Anxiety for up to 15 doses. lubiprostone (AMITIZA) 24 MCG capsule  Take 1 capsule by mouth 2 times daily (with meals)             mirtazapine (REMERON) 15 MG tablet  TAKE 1 TABLET NIGHTLY             naloxegol (MOVANTIK) 25 MG TABS tablet  Take 1 tablet by mouth every morning             oxyCODONE-acetaminophen (PERCOCET) 7.5-325 MG per tablet  Take 1 tablet by mouth every 6 hours as needed for Pain for up to 30 days. Intended supply: 30 days             polyethylene glycol (GLYCOLAX) 17 g packet  Take 17 g by mouth daily             rOPINIRole (REQUIP) 0.25 MG tablet  TAKE 1 TABLET BY MOUTH NIGHTLY             senna (SENOKOT) 8.6 MG tablet  Take 1 tablet by mouth 2 times daily as needed for Constipation             sodium chloride 1 g tablet  Take 1 tablet by mouth 2 times daily (with meals) for 5 days                 Consults:  None    Significant Diagnostic Studies:  Ct Abdomen Pelvis W Iv Contrast    Result Date: 10/24/2020  EXAMINATION: CT OF THE ABDOMEN AND PELVIS WITH CONTRAST, 10/24/2020 12:31 pm TECHNIQUE: CT of the abdomen and pelvis was performed with the administration of intravenous contrast. Multiplanar reformatted images are provided for review. Dose modulation, iterative reconstruction, and/or weight based adjustment of the mA/kV was utilized to reduce the radiation dose to as low as reasonably achievable.  COMPARISON: October 8, 2020 HISTORY: ORDERING SYSTEM PROVIDED HISTORY: Pain TECHNOLOGIST PROVIDED HISTORY: IV Only Contrast Pain Reason for Exam: RLQ pain for 3 months Acuity: Acute Type of Exam: Initial FINDINGS: Lower Chest: Lung bases are clear. Organs: No liver lesion. No gallstones. Dilated common bile duct. Dilated pancreatic duct. No discrete pancreatic lesion. No splenomegaly. No adrenal lesion. No hydronephrosis. No renal lesion. GI/Bowel: Moderate hiatal hernia. No bowel obstruction. Large stool volume. No acute inflammatory process. Pelvis: No free fluid. No bladder calculus. Peritoneum/Retroperitoneum: Atherosclerotic calcification of the abdominal aorta without aneurysmal dilatation. No adenopathy. Bones/Soft Tissues: No acute soft tissue abnormality. Lumbar spine degenerative changes. Large stool volume. No bowel obstruction. No acute inflammatory process. Common bile duct and pancreatic duct are again dilated. MRCP or ERCP could be helpful for further evaluation. Disposition:   home    Discharge Instructions: Activity: activity as tolerated  Diet:  regular diet    Follow up with Leslee Naidu MD in 1 weeks.     Signed:  Ilya Gallegos  10/25/2020, 12:51 PM    Time spent in discharge of this pt is more than 30 minutes in examination,evaluvation,  counseling and review of medication and discharge plan

## 2020-10-25 NOTE — H&P
Hospital Medicine  History and Physical                                                                                                                                                 Full Code    Patient:  Ney Bruner  MRN: [id number]                                                                                                                                                                     CHIEF COMPLAINT:  Feels nervous    History Obtained From:  patient  PCP: Sridhar Jennings MD    HISTORY OF PRESENT ILLNESS:   The patient is a 80 y.o. female who presents with h/o of feeling very nervous and abdominal pain, pt na was low, pt has h/o of cad and h/o of cad and s/p stent placed, pt has anxiety also, denies any fever, no chills. 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 Authorizing Provider   polyethylene glycol (GLYCOLAX) 17 g packet Take 17 g by mouth daily   Yes Historical Provider, MD   oxyCODONE-acetaminophen (PERCOCET) 7.5-325 MG per tablet Take 1 tablet by mouth every 6 hours as needed for Pain for up to 30 days. Intended supply: 30 days 10/8/20 11/7/20 Yes Sridhar Jennings MD   senna (SENOKOT) 8.6 MG tablet Take 1 tablet by mouth 2 times daily as needed for Constipation 10/8/20 10/8/21 Yes Sridhar Jennings MD   gabapentin (NEURONTIN) 300 MG capsule Take 2 capsules by mouth 2 times daily for 90 days.  9/27/20 12/26/20 Yes Sridhar Jennings MD   lisinopril (PRINIVIL;ZESTRIL) 20 MG tablet Take 1 tablet by mouth daily 8/20/20 Yes Read MD Travis   hydroCHLOROthiazide (HYDRODIURIL) 50 MG tablet Take 1 tablet by mouth daily 8/20/20  Yes Read MD Travis   lubiprostone [facility]) 24 MCG capsule Take 1 capsule by mouth 2 times daily (with meals) 7/30/20  Yes Read MD Travis   lactulose [facility]) 10 GM/15ML solution Take 15 mLs by mouth every evening 7/28/20  Yes Read MD Travis   amLODIPine (NORVASC) 2.5 MG tablet Take 1 tablet by mouth daily 5/18/20  Yes Read MD Travis   carvedilol (COREG) 3.125 MG tablet Take 2 tablets by mouth 2 times daily (with meals) 4/22/20  Yes Read MD Travis   hydrALAZINE (APRESOLINE) 25 MG tablet Take 1 tablet by mouth 2 times daily 3/19/20  Yes Read MD Travis   ezetimibe (ZETIA) 10 MG tablet TAKE 1 TABLET DAILY 3/19/20  Yes Read MD Travis   rOPINIRole (REQUIP) 0.25 MG tablet TAKE 1 TABLET BY MOUTH NIGHTLY 3/19/20  Yes Read MD Travis   mirtazapine (REMERON) 15 MG tablet TAKE 1 TABLET NIGHTLY 3/16/20  Yes Read MD Travis   aspirin 81 MG chewable tablet Take 81 mg by mouth   Yes Historical Provider, MD   naloxegol (MOVANTIK) 25 MG TABS tablet Take 1 tablet by mouth every morning 8/12/20   Read MD Travis   diclofenac sodium (VOLTAREN) 1 % GEL Apply 2 g topically 4 times daily 7/28/20   Read MD Travis   blood glucose test strips (ASCENSIA AUTODISC VI;ONE TOUCH ULTRA TEST VI) strip 1 each by In Vitro route daily As needed. 6/23/20   Read MD Travis   Lancets MISC Test sugars once daily.   R73.03 6/23/20   Read MD Travis       Current meds  Scheduled Meds:   sodium chloride  1 g Oral BID WC    [START ON 10/26/2020] hydroCHLOROthiazide  25 mg Oral Daily    LORazepam  0.5 mg Oral Once    amLODIPine  2.5 mg Oral Daily    aspirin  81 mg Oral Daily    carvedilol  6.25 mg Oral BID WC    diclofenac sodium  2 g Topical 4x Daily    ezetimibe  10 mg Oral Daily    gabapentin  600 mg Oral BID    hydrALAZINE  25 mg Oral BID    lactulose  10 g Oral QPM    lisinopril  20 mg Oral Daily    mirtazapine  15 mg Oral Nightly    naloxegol  25 mg Oral QAM    polyethylene glycol  17 g Oral Daily    rOPINIRole  0.25 mg Oral Nightly    famotidine  20 mg Oral BID    enoxaparin  40 mg Subcutaneous Daily     Continuous Infusions:   sodium chloride 75 mL/hr at 10/25/20 0646     PRN Meds:.oxyCODONE-acetaminophen, senna, acetaminophen **OR** acetaminophen, promethazine **OR** ondansetron    Allergies:  Atorvastatin; Codeine; Diltiazem hcl; Levofloxacin; Pregabalin; and Simvastatin    Social History:   TOBACCO:   reports that she has never smoked. She has never used smokeless tobacco.  ETOH:   reports no history of alcohol use. OCCUPATION:      Family History:   History reviewed. No pertinent family history.     REVIEW OF SYSTEMS:  Constitutional:  negative for  fevers, chills, sweats and weight loss  HEENT:  negative for  hearing loss, ear drainage, nasal congestion, snoring, hoarseness and voice change  Neck:  No swollen glands and No h/o goiter or thyroid disease  Cardiac:  negative for  chest pain, dyspnea, palpitations, orthopnea, PND, edema  Respiratory:  negative for  dry cough, cough with sputum, dyspnea, wheezing and hemoptysis  Gastrointestinal:  negative for nausea, vomiting, change in bowel habits, diarrhea, constipation, abdominal pain and hemtochezia  :  negative for frequency, dysuria, urinary incontinence, hesitancy, decreased stream and hematuria  Musculoskeletal:  negative for  myalgias, arthralgias, joint swelling and stiff joints  Neuro:  negative for headaches, dizziness, seizures, memory problems, visual disturbance, weakness and syncope      Physical Exam:    Vitals: BP (!) 142/49   Pulse 63   Temp 97.7 °F (36.5 °C) (Tympanic)   Resp 17   Ht 5' 1\" (1.549 m)   Wt 93 lb (42.2 kg)   SpO2 97%   BMI 17.57 kg/m²   General appearance: alert, appears stated age and cooperative  Skin: Skin color, texture, turgor normal. No rashes or lesions  HEENT: Head: Normocephalic, no Reason for Exam: RLQ pain for 3 months Acuity: Acute Type of Exam: Initial FINDINGS: Lower Chest: Lung bases are clear. Organs: No liver lesion. No gallstones. Dilated common bile duct. Dilated pancreatic duct. No discrete pancreatic lesion. No splenomegaly. No adrenal lesion. No hydronephrosis. No renal lesion. GI/Bowel: Moderate hiatal hernia. No bowel obstruction. Large stool volume. No acute inflammatory process. Pelvis: No free fluid. No bladder calculus. Peritoneum/Retroperitoneum: Atherosclerotic calcification of the abdominal aorta without aneurysmal dilatation. No adenopathy. Bones/Soft Tissues: No acute soft tissue abnormality. Lumbar spine degenerative changes. Large stool volume. No bowel obstruction. No acute inflammatory process. Common bile duct and pancreatic duct are again dilated. MRCP or ERCP could be helpful for further evaluation. EKG: no acute changes     Prophylaxis:   DVT with  [x] lovenox        [] heparin        [] Scd        [] none:     Assessment and Plan   1. Anxiety/ativan prn  2. Constipation  3. Hyponatremia  4.  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    Gastroesophageal reflux disease K21.9    Hyperlipidemia, mixed E78.2    Prediabetes R73.03    Shortness of breath R06.02    Constipation K59.00       Anticipated Disposition upon discharge: [] Home                                                                         [] Home with Home Health [] [facility]                                                                         [] [address]          Patient is admitted as inpatient status because of co-morbidities listed above, severity of signs and symptoms as outlined, requirement for current medical therapies and most importantly because of direct risk to patient if care not provided in a hospital setting.     Jeb Garcia MD  Admitting Hospitalist

## 2020-10-26 ENCOUNTER — TELEPHONE (OUTPATIENT)
Dept: FAMILY MEDICINE CLINIC | Age: 85
End: 2020-10-26

## 2020-10-27 NOTE — TELEPHONE ENCOUNTER
[facility] Transitions Initial Follow Up Call    Outreach made within 2 business days of discharge: Yes    Patient: Ken Estrella Patient : 1928   MRN: [id number]  Reason for Admission: hyponatremia   Discharge Date: 10/25/20       Spoke with: Brittany    Discharge department/facility: [facility]    TCM Interactive Patient Contact:  Was patient able to fill all prescriptions: Yes  Was patient instructed to bring all medications to the follow-up visit: Yes  Is patient taking all medications as directed in the discharge summary? Yes  Does patient understand their discharge instructions: Yes  Does patient have questions or concerns that need addressed prior to 7-14 day follow up office visit: no    Scheduled appointment with PCP within 7-14 days    Follow Up  Future Appointments   Date Time Provider Antonio Fallon   2020 11:40 AM [facility]       Tena Levin LPN     Daughter states the abd is there, but not as bad. They are keeping up with hydration and miralax. Pt is not taking the ativan at this time, insurance was not covering it. Jittery feeling has subsided. Advised them to call if any questions or concerns are to arise. Thank you!

## 2020-10-27 NOTE — TELEPHONE ENCOUNTER
Have been calling her daughter, Claire Mcmahon number and messages left. Will await a return call. Thank you!

## 2020-11-06 DIAGNOSIS — G89.29 CHRONIC MIDLINE LOW BACK PAIN WITHOUT SCIATICA: ICD-10-CM

## 2020-11-06 DIAGNOSIS — M54.50 CHRONIC MIDLINE LOW BACK PAIN WITHOUT SCIATICA: ICD-10-CM

## 2020-11-06 NOTE — TELEPHONE ENCOUNTER
Requested Prescriptions     Pending Prescriptions Disp Refills    oxyCODONE-acetaminophen (PERCOCET) 7.5-325 MG per tablet 120 tablet 0     Sig: Take 1 tablet by mouth every 6 hours as needed for Pain for up to 30 days.  Intended supply: 30 days     Last Appt:  10/8/2020  Next Appt:   Visit date not found  Med verified in [place]

## 2020-11-09 RX ORDER — OXYCODONE AND ACETAMINOPHEN 7.5; 325 MG/1; MG/1
1 TABLET ORAL EVERY 6 HOURS PRN
Qty: 120 TABLET | Refills: 0 | Status: SHIPPED | OUTPATIENT
Start: 2020-11-09 | End: 2020-12-07 | Stop reason: SDUPTHER

## 2020-11-23 RX ORDER — LORAZEPAM 0.5 MG/1
0.5 TABLET ORAL EVERY 12 HOURS PRN
Qty: 15 TABLET | Refills: 0 | OUTPATIENT
Start: 2020-11-23 | End: 2020-11-30

## 2020-11-23 NOTE — TELEPHONE ENCOUNTER
I had told her after she was out of the hospital that I do not want her taking ativan because it is not good for her to take with percocet. Does she feel like she needs something to take as needed for anxiety?

## 2020-12-11 RX ORDER — ROPINIROLE 0.25 MG/1
TABLET, FILM COATED ORAL
Qty: 90 TABLET | Refills: 2 | Status: SHIPPED | OUTPATIENT
Start: 2020-12-11 | End: 2021-02-11

## 2020-12-11 NOTE — TELEPHONE ENCOUNTER
Juan Rodriguez called - said [facility] is not sending her the Requip. Thinks she should still have refills but she can't find her bottle. Wants to know is you can send in a new Rx for her.

## 2020-12-14 RX ORDER — HYDRALAZINE HYDROCHLORIDE 25 MG/1
TABLET, FILM COATED ORAL
Qty: 180 TABLET | Refills: 1 | Status: SHIPPED | OUTPATIENT
Start: 2020-12-14 | End: 2021-01-08 | Stop reason: SDUPTHER

## 2020-12-14 RX ORDER — ROPINIROLE 0.25 MG/1
TABLET, FILM COATED ORAL
Qty: 90 TABLET | Refills: 2 | OUTPATIENT
Start: 2020-12-14

## 2020-12-26 LAB — SARS-COV-2: POSITIVE

## 2020-12-29 ENCOUNTER — TELEPHONE (OUTPATIENT)
Dept: FAMILY MEDICINE CLINIC | Age: 85
End: 2020-12-29

## 2020-12-30 LAB
BASOPHILS ABSOLUTE: NORMAL
BASOPHILS RELATIVE PERCENT: NORMAL
BUN BLDV-MCNC: 20 MG/DL
CALCIUM SERPL-MCNC: 8 MG/DL
CHLORIDE BLD-SCNC: 101 MMOL/L
CO2: 21 MMOL/L
CREAT SERPL-MCNC: 0.69 MG/DL
EOSINOPHILS ABSOLUTE: NORMAL
EOSINOPHILS RELATIVE PERCENT: NORMAL
GFR CALCULATED: 80
GLUCOSE BLD-MCNC: 121 MG/DL
HCT VFR BLD CALC: NORMAL %
HEMOGLOBIN: NORMAL
LYMPHOCYTES ABSOLUTE: NORMAL
LYMPHOCYTES RELATIVE PERCENT: NORMAL
MCH RBC QN AUTO: NORMAL PG
MCHC RBC AUTO-ENTMCNC: NORMAL G/DL
MCV RBC AUTO: NORMAL FL
MONOCYTES ABSOLUTE: NORMAL
MONOCYTES RELATIVE PERCENT: NORMAL
NEUTROPHILS ABSOLUTE: NORMAL
NEUTROPHILS RELATIVE PERCENT: NORMAL
PDW BLD-RTO: NORMAL %
PLATELET # BLD: 196 K/ΜL
PMV BLD AUTO: NORMAL FL
POTASSIUM SERPL-SCNC: 3.5 MMOL/L
RBC # BLD: NORMAL 10*6/UL
SODIUM BLD-SCNC: 128 MMOL/L
WBC # BLD: NORMAL 10*3/UL

## 2020-12-31 NOTE — TELEPHONE ENCOUNTER
Can you let Pravin Nolan (her daughter) know that it looks like Digna Lax has really low sodium that is causing her to be very confused so they are working on correcting that. They do that with IVF. Also it looks like she had a mild heart attack, but as least from what I can see they are not doing anything about that other than adjusting her medications.

## 2021-01-06 DIAGNOSIS — I21.4 NSTEMI (NON-ST ELEVATED MYOCARDIAL INFARCTION) (HCC): ICD-10-CM

## 2021-01-06 DIAGNOSIS — E87.1 HYPONATREMIA: Primary | ICD-10-CM

## 2021-01-06 RX ORDER — POLYMYXIN B SULFATE AND TRIMETHOPRIM 1; 10000 MG/ML; [USP'U]/ML
1 SOLUTION OPHTHALMIC EVERY 6 HOURS
Qty: 10 ML | Refills: 0 | Status: SHIPPED | OUTPATIENT
Start: 2021-01-06 | End: 2021-01-11

## 2021-01-07 ENCOUNTER — TELEPHONE (OUTPATIENT)
Dept: FAMILY MEDICINE CLINIC | Age: 86
End: 2021-01-07

## 2021-01-07 ENCOUNTER — TELEPHONE (OUTPATIENT)
Dept: NEPHROLOGY | Age: 86
End: 2021-01-07

## 2021-01-07 NOTE — TELEPHONE ENCOUNTER
Cass with [facility] calling stating HH was ordered for pt when she was discharged from [facility] but [facility]'s never sent them the order, when Cass called to get order, they told her once pt is discharged, their electronic med records disappear and they were unable to send order and Cass needed to call pt's PCP to get order for [facility], will you order, pt is Covid+ and they would like this ordered to they can do a lung check on pt, please advise Cass at [phone], pt is scheduled for VV on 1-8.

## 2021-01-07 NOTE — TELEPHONE ENCOUNTER
1/7/2021   Writer contacted patient's daughter, Pepe Crawford a referral to NEPHROLOGY after patient was an inpatient at  [facility].   Patient did not see a Nephrologist from [facility] in [place] when she was at [facility]. Patient saw Nephrologist Dr Chuck Galvin. Patient's daughter does not want her mother to [place] area for this follow up. Wants to be seen in [place] or [place] as she lives in [place]. Writer explained that patient can be seen at [facility] Nephrology clinic by Dr Odalis Greene or Dr Db Torres as patient lives in [place]. Nephrology office in [place] will contact patient's daughter to schedule at [facility], [address]. Instructed daughter to call [place] office to call on Tuesday 1/12/2021 if she does not hear back today on Monday. Information faxed to [place] office by Tara Loja

## 2021-01-08 ENCOUNTER — VIRTUAL VISIT (OUTPATIENT)
Dept: FAMILY MEDICINE CLINIC | Age: 86
End: 2021-01-08
Payer: MEDICARE

## 2021-01-08 DIAGNOSIS — I10 ESSENTIAL HYPERTENSION: ICD-10-CM

## 2021-01-08 DIAGNOSIS — E87.1 HYPONATREMIA: Primary | ICD-10-CM

## 2021-01-08 DIAGNOSIS — L03.213 PRESEPTAL CELLULITIS: ICD-10-CM

## 2021-01-08 DIAGNOSIS — M54.50 CHRONIC MIDLINE LOW BACK PAIN WITHOUT SCIATICA: ICD-10-CM

## 2021-01-08 DIAGNOSIS — U07.1 COVID-19: ICD-10-CM

## 2021-01-08 DIAGNOSIS — G89.29 CHRONIC MIDLINE LOW BACK PAIN WITHOUT SCIATICA: ICD-10-CM

## 2021-01-08 PROCEDURE — 1090F PRES/ABSN URINE INCON ASSESS: CPT | Performed by: FAMILY MEDICINE

## 2021-01-08 PROCEDURE — G8484 FLU IMMUNIZE NO ADMIN: HCPCS | Performed by: FAMILY MEDICINE

## 2021-01-08 PROCEDURE — 1123F ACP DISCUSS/DSCN MKR DOCD: CPT | Performed by: FAMILY MEDICINE

## 2021-01-08 PROCEDURE — G8427 DOCREV CUR MEDS BY ELIG CLIN: HCPCS | Performed by: FAMILY MEDICINE

## 2021-01-08 PROCEDURE — 1111F DSCHRG MED/CURRENT MED MERGE: CPT | Performed by: FAMILY MEDICINE

## 2021-01-08 PROCEDURE — 4040F PNEUMOC VAC/ADMIN/RCVD: CPT | Performed by: FAMILY MEDICINE

## 2021-01-08 PROCEDURE — 99213 OFFICE O/P EST LOW 20 MIN: CPT

## 2021-01-08 PROCEDURE — G8419 CALC BMI OUT NRM PARAM NOF/U: HCPCS | Performed by: FAMILY MEDICINE

## 2021-01-08 PROCEDURE — 99214 OFFICE O/P EST MOD 30 MIN: CPT | Performed by: FAMILY MEDICINE

## 2021-01-08 PROCEDURE — 1036F TOBACCO NON-USER: CPT | Performed by: FAMILY MEDICINE

## 2021-01-08 RX ORDER — AMOXICILLIN AND CLAVULANATE POTASSIUM 875; 125 MG/1; MG/1
1 TABLET, FILM COATED ORAL 2 TIMES DAILY
Qty: 20 TABLET | Refills: 0 | Status: SHIPPED | OUTPATIENT
Start: 2021-01-08 | End: 2021-01-11 | Stop reason: SDUPTHER

## 2021-01-08 RX ORDER — HYDRALAZINE HYDROCHLORIDE 25 MG/1
12.5 TABLET, FILM COATED ORAL 2 TIMES DAILY
Qty: 90 TABLET | Refills: 1
Start: 2021-01-08 | End: 2021-06-03 | Stop reason: SDUPTHER

## 2021-01-08 RX ORDER — OXYCODONE AND ACETAMINOPHEN 7.5; 325 MG/1; MG/1
1 TABLET ORAL EVERY 6 HOURS PRN
Qty: 120 TABLET | Refills: 0 | Status: SHIPPED | OUTPATIENT
Start: 2021-01-08 | End: 2021-03-02 | Stop reason: SDUPTHER

## 2021-01-08 ASSESSMENT — ENCOUNTER SYMPTOMS
CONSTIPATION: 0
NAUSEA: 0
DIARRHEA: 0
SHORTNESS OF BREATH: 1
BLURRED VISION: 0
EYE DISCHARGE: 1
BACK PAIN: 1
ABDOMINAL PAIN: 0
CHEST TIGHTNESS: 0
DOUBLE VISION: 0
WHEEZING: 0
COUGH: 0
FOREIGN BODY SENSATION: 0
PHOTOPHOBIA: 0

## 2021-01-08 ASSESSMENT — PATIENT HEALTH QUESTIONNAIRE - PHQ9: SUM OF ALL RESPONSES TO PHQ QUESTIONS 1-9: 0

## 2021-01-08 NOTE — PROGRESS NOTES
Post-Discharge Transitional Care Management Services or Hospital Follow Up      Michelle Martin   YOB: 1928    Date of Office Visit:  2021  Date of Hospital Admission: 10/24/20  Date of Hospital Discharge: 10/25/20  Readmission Risk Score(high >=14%. Medium >=10%):No data recorded    Care management risk score Rising risk (score 2-5) and Complex Care (Scores >=6): 8     Non face to face  following discharge, date last encounter closed (first attempt may have been earlier): *No documented post hospital discharge outreach found in the last 14 days *No documented post hospital discharge outreach found in the last 14 days    Call initiated 2 business days of discharge: *No response recorded in the last 14 days     Patient Active Problem List   Diagnosis    Memory loss    Essential hypertension    Psychophysiological insomnia    Therapeutic opioid-induced constipation (OIC)    Anxiety    Chronic bilateral low back pain    Chronic coronary artery disease    Dry eye syndrome of both lacrimal glands    Early stage nonexudative age-related macular degeneration of both eyes    Endothelial corneal dystrophy    Gastroesophageal reflux disease    Hyperlipidemia, mixed    Prediabetes    Shortness of breath    Constipation    Hyponatremia       Allergies   Allergen Reactions    Atorvastatin      Other reaction(s): Muscle Pain    Codeine     Diltiazem Hcl Hives    Levofloxacin      Other reaction(s):  Intolerance-unknown    Pregabalin      lyrica    Simvastatin      Other reaction(s): Muscle Pain       Medications listed as ordered at the time of discharge from [facility] Medication Instructions [facility]    Printed on:21 0991   Medication Information                      amLODIPine (NORVASC) 2.5 MG tablet  Take 1 tablet by mouth daily             amoxicillin-clavulanate (AUGMENTIN) 875-125 MG per tablet  Take 1 tablet by mouth 2 times daily for 10 days Outpatient Medications Marked as Taking for the 1/8/21 encounter (Virtual Visit) with Jany Alofnso MD   Medication Sig Dispense Refill    oxyCODONE-acetaminophen (PERCOCET) 7.5-325 MG per tablet Take 1 tablet by mouth every 6 hours as needed for Pain for up to 30 days. Intended supply: 30 days 120 tablet 0    hydrALAZINE (APRESOLINE) 25 MG tablet Take 0.5 tablets by mouth 2 times daily 90 tablet 1    amoxicillin-clavulanate (AUGMENTIN) 875-125 MG per tablet Take 1 tablet by mouth 2 times daily for 10 days 20 tablet 0    trimethoprim-polymyxin b (POLYTRIM) 83522-9.1 UNIT/ML-% ophthalmic solution Place 1 drop into the left eye every 6 hours for 5 days 10 mL 0    rOPINIRole (REQUIP) 0.25 MG tablet TAKE 1 TABLET BY MOUTH NIGHTLY 90 tablet 2    polyethylene glycol (GLYCOLAX) 17 g packet Take 17 g by mouth daily      senna (SENOKOT) 8.6 MG tablet Take 1 tablet by mouth 2 times daily as needed for Constipation 60 tablet 2    lisinopril (PRINIVIL;ZESTRIL) 20 MG tablet Take 1 tablet by mouth daily 90 tablet 1    naloxegol (MOVANTIK) 25 MG TABS tablet Take 1 tablet by mouth every morning 30 tablet 5    lubiprostone (AMITIZA) 24 MCG capsule Take 1 capsule by mouth 2 times daily (with meals) 60 capsule 3    diclofenac sodium (VOLTAREN) 1 % GEL Apply 2 g topically 4 times daily 2 Tube 3    lactulose (CHRONULAC) 10 GM/15ML solution Take 15 mLs by mouth every evening 150 mL 1    blood glucose test strips (ASCENSIA AUTODISC VI;ONE TOUCH ULTRA TEST VI) strip 1 each by In Vitro route daily As needed. 100 each 1    Lancets MISC Test sugars once daily.   R73.03 100 each 1    amLODIPine (NORVASC) 2.5 MG tablet Take 1 tablet by mouth daily 90 tablet 1    carvedilol (COREG) 3.125 MG tablet Take 2 tablets by mouth 2 times daily (with meals) 360 tablet 3    ezetimibe (ZETIA) 10 MG tablet TAKE 1 TABLET DAILY 90 tablet 3    mirtazapine (REMERON) 15 MG tablet TAKE 1 TABLET NIGHTLY 90 tablet 3  aspirin 81 MG chewable tablet Take 81 mg by mouth          Medications patient taking as of now reconciled against medications ordered at time of hospital discharge: Yes    Chief Complaint   Patient presents with   [address] from [facility]     +[facility], discharged 1/3/2021, Hyponatremia, NSTEMI    Pain     pain patch not helping    Eye Problem     right eye, crusty and matted shut       Here today for a hospital follow up from being admitted with hyponatremia and covid     Eye Problem   The right eye is affected. This is a new problem. The current episode started in the past 7 days (4 days). The problem occurs constantly. The problem has been gradually worsening. There was no injury mechanism. The pain is at a severity of 5/10. The pain is moderate. There is no known exposure to pink eye. She does not wear contacts. Associated symptoms include an eye discharge and a recent URI. Pertinent negatives include no blurred vision, double vision, fever, foreign body sensation, nausea, photophobia or weakness. She has tried eye drops for the symptoms. The treatment provided no relief. Her appetite has been good. She is not short of breath. She is not coughing much. She is not having any diarrhea. Her daughter is trying to make her get up and move more since she has been home. She has been having a lot of leg pain becauase she was taken off of her percocet while she was in the hospital and she was put on fentanyl and she does not think that is working as well for her. She would like to restart the percocet, but her daughter wasn't sure that she was allowed to. She has been walking with a walker anywhere she goes in the house. She is just feeling very weak. Inpatient course: Discharge summary reviewed- see chart. Interval history/Current status: improving    Review of Systems   Constitutional: Positive for fatigue. Negative for activity change, appetite change, chills and fever. Eyes: Positive for discharge. Negative for blurred vision, double vision, photophobia and visual disturbance. Respiratory: Positive for shortness of breath. Negative for cough, chest tightness and wheezing. Cardiovascular: Negative for chest pain, palpitations and leg swelling. Gastrointestinal: Negative for abdominal pain, constipation, diarrhea and nausea. Genitourinary: Negative for difficulty urinating. Musculoskeletal: Positive for arthralgias (leg pain) and back pain. Neurological: Negative for dizziness, syncope, weakness, light-headedness and headaches. Psychiatric/Behavioral: Positive for sleep disturbance (sleeping a lot). Negative for dysphoric mood. The patient is not nervous/anxious. There were no vitals filed for this visit. There is no height or weight on file to calculate BMI. Wt Readings from Last 3 Encounters:   10/25/20 93 lb (42.2 kg)   10/24/20 91 lb (41.3 kg)   07/28/20 100 lb (45.4 kg)     BP Readings from Last 3 Encounters:   10/25/20 (!) 142/49   10/24/20 (!) 150/80   07/28/20 138/60       Physical Exam  Vitals signs and nursing note reviewed. Constitutional:       General: She is not in acute distress. Appearance: She is well-developed. HENT:      Head:     Eyes:      Comments: Swelling of the eye lid   Neck:      Musculoskeletal: Normal range of motion. Thyroid: No thyromegaly. Pulmonary:      Effort: Pulmonary effort is normal. No respiratory distress. Skin:     Findings: No rash. Neurological:      Mental Status: She is alert and oriented to person, place, and time. Psychiatric:         Mood and Affect: Mood normal.         Behavior: Behavior normal.         Thought Content: Thought content normal.             Assessment/Plan:  1.  Hyponatremia Improving; her sodium had returned to normal once she was discharged. I will keep her off of the hctz and I advised her to stay on the sodium tablets for now. I also put in a referral to nephrology that she will see in the next few months. - External Referral To Home Health    2. COVID-19  Resolving; she is doing a lot better but she is still very weak and short of breath. I put in a referral for home PT and OT.     - External Referral To Home Health    3. Preseptal cellulitis  New; I talked to her daughter earlier this week and it sounded like she had pinkeye but at her exam today her whole eye is red, swollen and sore so I will treat with augmentin. 4. Chronic midline low back pain without sciatica  Stable; she needed a refill of her percocet so that was sent. - oxyCODONE-acetaminophen (PERCOCET) 7.5-325 MG per tablet; Take 1 tablet by mouth every 6 hours as needed for Pain for up to 30 days. Intended supply: 30 days  Dispense: 120 tablet; Refill: 0    5. Essential hypertension  - hydrALAZINE (APRESOLINE) 25 MG tablet; Take 0.5 tablets by mouth 2 times daily  Dispense: 90 tablet;  Refill: 1        Medical Decision Making: moderate complexity    Severiano Bussing, MD  1/8/2021  10:49 AM

## 2021-01-11 ENCOUNTER — TELEPHONE (OUTPATIENT)
Dept: FAMILY MEDICINE CLINIC | Age: 86
End: 2021-01-11

## 2021-01-11 RX ORDER — AMOXICILLIN AND CLAVULANATE POTASSIUM 875; 125 MG/1; MG/1
1 TABLET, FILM COATED ORAL 2 TIMES DAILY
Qty: 20 TABLET | Refills: 0 | Status: SHIPPED | OUTPATIENT
Start: 2021-01-11 | End: 2021-01-21

## 2021-01-11 NOTE — TELEPHONE ENCOUNTER
Brittany calling stating she has lost pt's augmentin, [facility] uses [facility], please advise brittany at above number.

## 2021-01-12 ENCOUNTER — TELEPHONE (OUTPATIENT)
Dept: FAMILY MEDICINE CLINIC | Age: 86
End: 2021-01-12

## 2021-01-12 NOTE — TELEPHONE ENCOUNTER
[facility] calling stating they had gotten a referral for pt and called daughter to schedule and they refused.

## 2021-01-14 ENCOUNTER — TELEPHONE (OUTPATIENT)
Dept: FAMILY MEDICINE CLINIC | Age: 86
End: 2021-01-14

## 2021-01-14 RX ORDER — GLYCERIN PEDIATRIC
1 SUPPOSITORY, RECTAL RECTAL PRN
Qty: 5 SUPPOSITORY | Refills: 0 | Status: SHIPPED | OUTPATIENT
Start: 2021-01-14

## 2021-01-14 NOTE — TELEPHONE ENCOUNTER
Annie Smith states pt does have stool at base of rectum, unable to push out. Please send suppository to [facility].

## 2021-01-14 NOTE — TELEPHONE ENCOUNTER
Brittany calling stating pt has not been able to have a BM, they have been trying different things since Monday with no resolve, can you recommend something, [facility] uses [facility], please advise at above number.

## 2021-01-14 NOTE — TELEPHONE ENCOUNTER
She needs to be using a capful of miralax daily every day and she can do sennakot bid. Does she feel like there is a hard lump of stool right at the rectum that won't come out? If so I will send in a suppository.

## 2021-02-09 RX ORDER — LISINOPRIL 20 MG/1
20 TABLET ORAL DAILY
Qty: 90 TABLET | Refills: 1 | Status: SHIPPED | OUTPATIENT
Start: 2021-02-09 | End: 2021-02-11 | Stop reason: DRUGHIGH

## 2021-02-09 RX ORDER — SODIUM CHLORIDE 1000 MG
1 TABLET, SOLUBLE MISCELLANEOUS 2 TIMES DAILY WITH MEALS
Refills: 0 | OUTPATIENT
Start: 2021-02-09 | End: 2021-02-14

## 2021-02-09 RX ORDER — CARVEDILOL 3.12 MG/1
6.25 TABLET ORAL 2 TIMES DAILY WITH MEALS
Qty: 360 TABLET | Refills: 1 | Status: SHIPPED | OUTPATIENT
Start: 2021-02-09 | End: 2021-02-11 | Stop reason: SDUPTHER

## 2021-02-09 NOTE — TELEPHONE ENCOUNTER
[address] [facility] fax refill request for     [employer] [facility] 30mg tab 1 tab by mouth in the morning    Also requesting Amlodipine 5mg 1 tab by mouth twice daily    Carvedilol 12.5mg 1 tab by mouth twice daily    Lipitor 40mg 1 tab by mouth bedtime    Lisinopril 2.5mg 1 tab by mouth twice daily

## 2021-02-09 NOTE — TELEPHONE ENCOUNTER
Pharmacist calling to speak to [address] [place] [address] office regarding pended meds, dosing and duplications, please call [facility].

## 2021-02-11 ENCOUNTER — HOSPITAL ENCOUNTER (INPATIENT)
Age: 86
LOS: 3 days | Discharge: HOME | DRG: 291 | End: 2021-02-14
Attending: EMERGENCY MEDICINE | Admitting: FAMILY MEDICINE
Payer: MEDICARE

## 2021-02-11 ENCOUNTER — VIRTUAL VISIT (OUTPATIENT)
Dept: FAMILY MEDICINE CLINIC | Age: 86
DRG: 291 | End: 2021-02-11
Payer: MEDICARE

## 2021-02-11 ENCOUNTER — APPOINTMENT (OUTPATIENT)
Dept: CT IMAGING | Age: 86
DRG: 291 | End: 2021-02-11
Payer: MEDICARE

## 2021-02-11 DIAGNOSIS — I10 ESSENTIAL HYPERTENSION: ICD-10-CM

## 2021-02-11 DIAGNOSIS — D64.9 ANEMIA, UNSPECIFIED TYPE: ICD-10-CM

## 2021-02-11 DIAGNOSIS — I50.9 NEW ONSET OF CONGESTIVE HEART FAILURE (HCC): Primary | ICD-10-CM

## 2021-02-11 DIAGNOSIS — D50.9 IRON DEFICIENCY ANEMIA, UNSPECIFIED IRON DEFICIENCY ANEMIA TYPE: ICD-10-CM

## 2021-02-11 DIAGNOSIS — E78.2 ELEVATED TRIGLYCERIDES WITH HIGH CHOLESTEROL: ICD-10-CM

## 2021-02-11 DIAGNOSIS — I50.9 ACUTE CONGESTIVE HEART FAILURE, UNSPECIFIED HEART FAILURE TYPE (HCC): ICD-10-CM

## 2021-02-11 DIAGNOSIS — R06.02 SHORTNESS OF BREATH: ICD-10-CM

## 2021-02-11 DIAGNOSIS — M79.89 LEG SWELLING: Primary | ICD-10-CM

## 2021-02-11 DIAGNOSIS — E87.1 HYPONATREMIA: ICD-10-CM

## 2021-02-11 LAB
-: NORMAL
ABSOLUTE EOS #: 0.11 K/UL (ref 0–0.44)
ABSOLUTE IMMATURE GRANULOCYTE: 0.03 K/UL (ref 0–0.3)
ABSOLUTE LYMPH #: 1.74 K/UL (ref 1.1–3.7)
ABSOLUTE MONO #: 0.72 K/UL (ref 0.1–1.2)
ALBUMIN SERPL-MCNC: 3.3 G/DL (ref 3.5–5.2)
ALBUMIN/GLOBULIN RATIO: 1 (ref 1–2.5)
ALP BLD-CCNC: 69 U/L (ref 35–104)
ALT SERPL-CCNC: 17 U/L (ref 5–33)
AMORPHOUS: NORMAL
ANION GAP SERPL CALCULATED.3IONS-SCNC: 11 MMOL/L (ref 9–17)
AST SERPL-CCNC: 28 U/L
BACTERIA: NORMAL
BASOPHILS # BLD: 0 % (ref 0–2)
BASOPHILS ABSOLUTE: 0.03 K/UL (ref 0–0.2)
BILIRUB SERPL-MCNC: 0.33 MG/DL (ref 0.3–1.2)
BILIRUBIN URINE: NEGATIVE
BNP INTERPRETATION: ABNORMAL
BUN BLDV-MCNC: 18 MG/DL (ref 8–23)
BUN/CREAT BLD: 21 (ref 9–20)
CALCIUM SERPL-MCNC: 8.7 MG/DL (ref 8.6–10.4)
CASTS UA: NORMAL /LPF (ref 0–2)
CHLORIDE BLD-SCNC: 104 MMOL/L (ref 98–107)
CO2: 22 MMOL/L (ref 20–31)
COLOR: ABNORMAL
COMMENT UA: ABNORMAL
CREAT SERPL-MCNC: 0.87 MG/DL (ref 0.5–0.9)
CRYSTALS, UA: NORMAL /HPF
D-DIMER QUANTITATIVE: 3.26 MG/L FEU (ref 0–0.59)
DIFFERENTIAL TYPE: ABNORMAL
EKG ATRIAL RATE: 74 BPM
EKG P AXIS: 65 DEGREES
EKG P-R INTERVAL: 156 MS
EKG Q-T INTERVAL: 446 MS
EKG QRS DURATION: 114 MS
EKG QTC CALCULATION (BAZETT): 495 MS
EKG R AXIS: 107 DEGREES
EKG T AXIS: 12 DEGREES
EKG VENTRICULAR RATE: 74 BPM
EOSINOPHILS RELATIVE PERCENT: 2 % (ref 1–4)
EPITHELIAL CELLS UA: NORMAL /HPF (ref 0–5)
GFR AFRICAN AMERICAN: >60 ML/MIN
GFR NON-AFRICAN AMERICAN: >60 ML/MIN
GFR SERPL CREATININE-BSD FRML MDRD: ABNORMAL ML/MIN/{1.73_M2}
GFR SERPL CREATININE-BSD FRML MDRD: ABNORMAL ML/MIN/{1.73_M2}
GLUCOSE BLD-MCNC: 160 MG/DL (ref 70–99)
GLUCOSE URINE: NEGATIVE
HCT VFR BLD CALC: 27.6 % (ref 36.3–47.1)
HEMOGLOBIN: 8.1 G/DL (ref 11.9–15.1)
IMMATURE GRANULOCYTES: 0 %
INR BLD: 1.2
KETONES, URINE: NEGATIVE
LEUKOCYTE ESTERASE, URINE: NEGATIVE
LYMPHOCYTES # BLD: 23 % (ref 24–43)
MAGNESIUM: 2.2 MG/DL (ref 1.6–2.6)
MCH RBC QN AUTO: 25.9 PG (ref 25.2–33.5)
MCHC RBC AUTO-ENTMCNC: 29.3 G/DL (ref 25.2–33.5)
MCV RBC AUTO: 88.2 FL (ref 82.6–102.9)
MONOCYTES # BLD: 10 % (ref 3–12)
MUCUS: NORMAL
NITRITE, URINE: NEGATIVE
NRBC AUTOMATED: 0 PER 100 WBC
OTHER OBSERVATIONS UA: NORMAL
PARTIAL THROMBOPLASTIN TIME: 30.1 SEC (ref 23.9–33.8)
PDW BLD-RTO: 16.1 % (ref 11.8–14.4)
PH UA: 6.5 (ref 5–6)
PLATELET # BLD: 303 K/UL (ref 138–453)
PLATELET ESTIMATE: ABNORMAL
PMV BLD AUTO: 11.1 FL (ref 8.1–13.5)
POC OCCULT BLOOD, FECAL: NEGATIVE
POTASSIUM SERPL-SCNC: 4.5 MMOL/L (ref 3.7–5.3)
PRO-BNP: 850 PG/ML
PROTEIN UA: NEGATIVE
PROTHROMBIN TIME: 14.9 SEC (ref 11.5–14.2)
RBC # BLD: 3.13 M/UL (ref 3.95–5.11)
RBC # BLD: ABNORMAL 10*6/UL
RBC UA: NORMAL /HPF (ref 0–4)
RENAL EPITHELIAL, UA: NORMAL /HPF
SARS-COV-2, RAPID: DETECTED
SARS-COV-2: ABNORMAL
SARS-COV-2: ABNORMAL
SEG NEUTROPHILS: 65 % (ref 36–65)
SEGMENTED NEUTROPHILS ABSOLUTE COUNT: 4.91 K/UL (ref 1.5–8.1)
SODIUM BLD-SCNC: 137 MMOL/L (ref 135–144)
SOURCE: ABNORMAL
SPECIFIC GRAVITY UA: 1.01 (ref 1.01–1.02)
TOTAL PROTEIN: 6.6 G/DL (ref 6.4–8.3)
TRICHOMONAS: NORMAL
TROPONIN INTERP: ABNORMAL
TROPONIN T: ABNORMAL NG/ML
TROPONIN, HIGH SENSITIVITY: 77 NG/L (ref 0–14)
TROPONIN, HIGH SENSITIVITY: 78 NG/L (ref 0–14)
TROPONIN, HIGH SENSITIVITY: 86 NG/L (ref 0–14)
TURBIDITY: ABNORMAL
URINE HGB: NEGATIVE
UROBILINOGEN, URINE: NORMAL
WBC # BLD: 7.5 K/UL (ref 3.5–11.3)
WBC # BLD: ABNORMAL 10*3/UL
WBC UA: NORMAL /HPF (ref 0–4)
YEAST: NORMAL

## 2021-02-11 PROCEDURE — 71260 CT THORAX DX C+: CPT

## 2021-02-11 PROCEDURE — 85379 FIBRIN DEGRADATION QUANT: CPT

## 2021-02-11 PROCEDURE — 81001 URINALYSIS AUTO W/SCOPE: CPT

## 2021-02-11 PROCEDURE — 84484 ASSAY OF TROPONIN QUANT: CPT

## 2021-02-11 PROCEDURE — 93005 ELECTROCARDIOGRAM TRACING: CPT | Performed by: EMERGENCY MEDICINE

## 2021-02-11 PROCEDURE — 82272 OCCULT BLD FECES 1-3 TESTS: CPT

## 2021-02-11 PROCEDURE — 4040F PNEUMOC VAC/ADMIN/RCVD: CPT | Performed by: FAMILY MEDICINE

## 2021-02-11 PROCEDURE — 6360000004 HC RX CONTRAST MEDICATION: Performed by: EMERGENCY MEDICINE

## 2021-02-11 PROCEDURE — 83036 HEMOGLOBIN GLYCOSYLATED A1C: CPT

## 2021-02-11 PROCEDURE — 83540 ASSAY OF IRON: CPT

## 2021-02-11 PROCEDURE — 85025 COMPLETE CBC W/AUTO DIFF WBC: CPT

## 2021-02-11 PROCEDURE — 1123F ACP DISCUSS/DSCN MKR DOCD: CPT | Performed by: FAMILY MEDICINE

## 2021-02-11 PROCEDURE — 83880 ASSAY OF NATRIURETIC PEPTIDE: CPT

## 2021-02-11 PROCEDURE — 96374 THER/PROPH/DIAG INJ IV PUSH: CPT

## 2021-02-11 PROCEDURE — 85610 PROTHROMBIN TIME: CPT

## 2021-02-11 PROCEDURE — 99285 EMERGENCY DEPT VISIT HI MDM: CPT

## 2021-02-11 PROCEDURE — 36415 COLL VENOUS BLD VENIPUNCTURE: CPT

## 2021-02-11 PROCEDURE — 6360000002 HC RX W HCPCS: Performed by: EMERGENCY MEDICINE

## 2021-02-11 PROCEDURE — 2060000000 HC ICU INTERMEDIATE R&B

## 2021-02-11 PROCEDURE — 99214 OFFICE O/P EST MOD 30 MIN: CPT | Performed by: FAMILY MEDICINE

## 2021-02-11 PROCEDURE — 1090F PRES/ABSN URINE INCON ASSESS: CPT | Performed by: FAMILY MEDICINE

## 2021-02-11 PROCEDURE — G8427 DOCREV CUR MEDS BY ELIG CLIN: HCPCS | Performed by: FAMILY MEDICINE

## 2021-02-11 PROCEDURE — 2580000003 HC RX 258: Performed by: NURSE PRACTITIONER

## 2021-02-11 PROCEDURE — 99212 OFFICE O/P EST SF 10 MIN: CPT | Performed by: FAMILY MEDICINE

## 2021-02-11 PROCEDURE — 80053 COMPREHEN METABOLIC PANEL: CPT

## 2021-02-11 PROCEDURE — 83735 ASSAY OF MAGNESIUM: CPT

## 2021-02-11 PROCEDURE — 6370000000 HC RX 637 (ALT 250 FOR IP): Performed by: NURSE PRACTITIONER

## 2021-02-11 PROCEDURE — 83550 IRON BINDING TEST: CPT

## 2021-02-11 PROCEDURE — U0002 COVID-19 LAB TEST NON-CDC: HCPCS

## 2021-02-11 PROCEDURE — 85730 THROMBOPLASTIN TIME PARTIAL: CPT

## 2021-02-11 RX ORDER — NICOTINE 21 MG/24HR
1 PATCH, TRANSDERMAL 24 HOURS TRANSDERMAL DAILY PRN
Status: DISCONTINUED | OUTPATIENT
Start: 2021-02-11 | End: 2021-02-14 | Stop reason: HOSPADM

## 2021-02-11 RX ORDER — GABAPENTIN 300 MG/1
600 CAPSULE ORAL 2 TIMES DAILY
Status: DISCONTINUED | OUTPATIENT
Start: 2021-02-11 | End: 2021-02-14 | Stop reason: HOSPADM

## 2021-02-11 RX ORDER — AMLODIPINE BESYLATE 2.5 MG/1
2.5 TABLET ORAL 2 TIMES DAILY
Qty: 180 TABLET | Refills: 2 | Status: ON HOLD | OUTPATIENT
Start: 2021-02-11 | End: 2021-02-14 | Stop reason: SDUPTHER

## 2021-02-11 RX ORDER — SODIUM CHLORIDE 1000 MG
1 TABLET, SOLUBLE MISCELLANEOUS 2 TIMES DAILY WITH MEALS
Qty: 10 TABLET | Refills: 0 | OUTPATIENT
Start: 2021-02-11 | End: 2021-02-16

## 2021-02-11 RX ORDER — LANOLIN ALCOHOL/MO/W.PET/CERES
3 CREAM (GRAM) TOPICAL NIGHTLY PRN
Status: DISCONTINUED | OUTPATIENT
Start: 2021-02-11 | End: 2021-02-14 | Stop reason: HOSPADM

## 2021-02-11 RX ORDER — SODIUM CHLORIDE 1000 MG
1 TABLET, SOLUBLE MISCELLANEOUS 2 TIMES DAILY WITH MEALS
Status: DISCONTINUED | OUTPATIENT
Start: 2021-02-12 | End: 2021-02-14 | Stop reason: HOSPADM

## 2021-02-11 RX ORDER — SENNA PLUS 8.6 MG/1
1 TABLET ORAL 2 TIMES DAILY PRN
Status: DISCONTINUED | OUTPATIENT
Start: 2021-02-11 | End: 2021-02-14 | Stop reason: HOSPADM

## 2021-02-11 RX ORDER — LISINOPRIL 2.5 MG/1
20 TABLET ORAL DAILY
Qty: 90 TABLET | Refills: 2 | Status: SHIPPED
Start: 2021-02-11 | End: 2021-02-11 | Stop reason: SDUPTHER

## 2021-02-11 RX ORDER — CARVEDILOL 12.5 MG/1
12.5 TABLET ORAL 2 TIMES DAILY WITH MEALS
Status: DISCONTINUED | OUTPATIENT
Start: 2021-02-12 | End: 2021-02-14 | Stop reason: HOSPADM

## 2021-02-11 RX ORDER — SODIUM CHLORIDE 1000 MG
1 TABLET, SOLUBLE MISCELLANEOUS 2 TIMES DAILY WITH MEALS
Qty: 60 TABLET | Refills: 0 | Status: ON HOLD | OUTPATIENT
Start: 2021-02-11 | End: 2021-02-14 | Stop reason: HOSPADM

## 2021-02-11 RX ORDER — SODIUM CHLORIDE 0.9 % (FLUSH) 0.9 %
10 SYRINGE (ML) INJECTION EVERY 12 HOURS SCHEDULED
Status: DISCONTINUED | OUTPATIENT
Start: 2021-02-11 | End: 2021-02-14 | Stop reason: HOSPADM

## 2021-02-11 RX ORDER — FUROSEMIDE 20 MG/1
20 TABLET ORAL DAILY PRN
Qty: 5 TABLET | Refills: 0 | Status: SHIPPED | OUTPATIENT
Start: 2021-02-11 | End: 2021-08-13

## 2021-02-11 RX ORDER — ONDANSETRON 2 MG/ML
4 INJECTION INTRAMUSCULAR; INTRAVENOUS EVERY 6 HOURS PRN
Status: DISCONTINUED | OUTPATIENT
Start: 2021-02-11 | End: 2021-02-14 | Stop reason: HOSPADM

## 2021-02-11 RX ORDER — CARVEDILOL 12.5 MG/1
12.5 TABLET ORAL 2 TIMES DAILY WITH MEALS
Qty: 180 TABLET | Refills: 2 | Status: SHIPPED | OUTPATIENT
Start: 2021-02-11 | End: 2021-10-21

## 2021-02-11 RX ORDER — POLYETHYLENE GLYCOL 3350 17 G/17G
17 POWDER, FOR SOLUTION ORAL DAILY PRN
Status: DISCONTINUED | OUTPATIENT
Start: 2021-02-11 | End: 2021-02-14 | Stop reason: HOSPADM

## 2021-02-11 RX ORDER — POLYETHYLENE GLYCOL 3350 17 G/17G
17 POWDER, FOR SOLUTION ORAL DAILY
Status: DISCONTINUED | OUTPATIENT
Start: 2021-02-12 | End: 2021-02-14 | Stop reason: HOSPADM

## 2021-02-11 RX ORDER — AMLODIPINE BESYLATE 2.5 MG/1
2.5 TABLET ORAL 2 TIMES DAILY
Status: DISCONTINUED | OUTPATIENT
Start: 2021-02-11 | End: 2021-02-12

## 2021-02-11 RX ORDER — SODIUM CHLORIDE 0.9 % (FLUSH) 0.9 %
10 SYRINGE (ML) INJECTION PRN
Status: DISCONTINUED | OUTPATIENT
Start: 2021-02-11 | End: 2021-02-14 | Stop reason: HOSPADM

## 2021-02-11 RX ORDER — ACETAMINOPHEN 325 MG/1
650 TABLET ORAL EVERY 6 HOURS PRN
Status: DISCONTINUED | OUTPATIENT
Start: 2021-02-11 | End: 2021-02-14 | Stop reason: HOSPADM

## 2021-02-11 RX ORDER — LISINOPRIL 2.5 MG/1
2.5 TABLET ORAL DAILY
Status: DISCONTINUED | OUTPATIENT
Start: 2021-02-12 | End: 2021-02-14 | Stop reason: HOSPADM

## 2021-02-11 RX ORDER — ASPIRIN 81 MG/1
81 TABLET, CHEWABLE ORAL DAILY
Status: DISCONTINUED | OUTPATIENT
Start: 2021-02-12 | End: 2021-02-14 | Stop reason: HOSPADM

## 2021-02-11 RX ORDER — FUROSEMIDE 10 MG/ML
40 INJECTION INTRAMUSCULAR; INTRAVENOUS ONCE
Status: COMPLETED | OUTPATIENT
Start: 2021-02-11 | End: 2021-02-11

## 2021-02-11 RX ORDER — FUROSEMIDE 10 MG/ML
20 INJECTION INTRAMUSCULAR; INTRAVENOUS 2 TIMES DAILY
Status: DISCONTINUED | OUTPATIENT
Start: 2021-02-11 | End: 2021-02-14 | Stop reason: HOSPADM

## 2021-02-11 RX ORDER — ACETAMINOPHEN 650 MG/1
650 SUPPOSITORY RECTAL EVERY 6 HOURS PRN
Status: DISCONTINUED | OUTPATIENT
Start: 2021-02-11 | End: 2021-02-14 | Stop reason: HOSPADM

## 2021-02-11 RX ORDER — PROMETHAZINE HYDROCHLORIDE 25 MG/1
12.5 TABLET ORAL EVERY 6 HOURS PRN
Status: DISCONTINUED | OUTPATIENT
Start: 2021-02-11 | End: 2021-02-14 | Stop reason: HOSPADM

## 2021-02-11 RX ORDER — HYDRALAZINE HYDROCHLORIDE 25 MG/1
12.5 TABLET, FILM COATED ORAL 2 TIMES DAILY
Status: DISCONTINUED | OUTPATIENT
Start: 2021-02-11 | End: 2021-02-14 | Stop reason: HOSPADM

## 2021-02-11 RX ORDER — LISINOPRIL 2.5 MG/1
2.5 TABLET ORAL DAILY
Qty: 180 TABLET | Refills: 2 | Status: SHIPPED | OUTPATIENT
Start: 2021-02-11 | End: 2022-03-29

## 2021-02-11 RX ADMIN — MELATONIN 3 MG: 3 TAB ORAL at 21:56

## 2021-02-11 RX ADMIN — AMLODIPINE BESYLATE 2.5 MG: 2.5 TABLET ORAL at 21:56

## 2021-02-11 RX ADMIN — FUROSEMIDE 40 MG: 10 INJECTION, SOLUTION INTRAMUSCULAR; INTRAVENOUS at 18:53

## 2021-02-11 RX ADMIN — HYDRALAZINE HYDROCHLORIDE 12.5 MG: 25 TABLET, FILM COATED ORAL at 21:56

## 2021-02-11 RX ADMIN — IOPAMIDOL 80 ML: 755 INJECTION, SOLUTION INTRAVENOUS at 17:45

## 2021-02-11 RX ADMIN — GABAPENTIN 600 MG: 300 CAPSULE ORAL at 21:56

## 2021-02-11 RX ADMIN — SODIUM CHLORIDE, PRESERVATIVE FREE 10 ML: 5 INJECTION INTRAVENOUS at 22:01

## 2021-02-11 ASSESSMENT — ENCOUNTER SYMPTOMS
DIARRHEA: 0
BACK PAIN: 1
COUGH: 1
VOMITING: 0
ABDOMINAL PAIN: 0
SHORTNESS OF BREATH: 1
SHORTNESS OF BREATH: 1
CHEST TIGHTNESS: 0
SORE THROAT: 0
WHEEZING: 0

## 2021-02-11 NOTE — ED PROVIDER NOTES
[facility] ED  [address]  [place] [address]  Phone: [phone]        [address] [place] [address] ED  EMERGENCY DEPARTMENT ENCOUNTER      Pt Name: Kermit Essex  MRN: [id number]  Sumeetgflaury 8/16/1928  Date of evaluation: 2/11/2021  Provider: Gracie Hernandez DO    CHIEF COMPLAINT       Chief Complaint   Patient presents with    Shortness of Breath     worsening over past 2 weeks         HISTORY OF PRESENT ILLNESS   (Location/Symptom, Timing/Onset,Context/Setting, Quality, Duration, Modifying Factors, Severity)  Note limiting factors. Kermit Essex is a 80 y.o. female who presents to the emergency department for the evaluation of shortness of breath. Patient states that this is been a progressive issue over the past few weeks. Patient states that she has a slight cough but this really is not new. It is nonproductive and she has no fever. Patient denies any history of heart failure or significant coronary artery disease recently but she does have a stent that was placed about 20 years ago. She is noticing swelling in her legs and dyspnea on exertion. She has no history of blood clots. Denies any recent travel. Walking around and doing her normal activities make all of this much worse    Nursing Notes were reviewed. REVIEW OF SYSTEMS    (2-9systems for level 4, 10 or more for level 5)     Review of Systems   Constitutional: Negative for fever. HENT: Negative for sore throat. Respiratory: Positive for shortness of breath. Cardiovascular: Positive for leg swelling. Negative for chest pain. Gastrointestinal: Negative for diarrhea and vomiting. Genitourinary: Negative for dysuria. Skin: Negative for rash. Neurological: Negative for weakness. All other systems reviewed and are negative. Except asnoted above the remainder of the review of systems was reviewed and negative.        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     L4 & L5    Cancer (HCC)     skin cancers    Depression     History of heart artery stent     Hyperlipidemia     Hypertension     Nerve pain          SURGICAL HISTORY       Past Surgical History:   Procedure Laterality Date    APPENDECTOMY  1948    LUMBAR One [address] Pb SURGERY  2012    lumbar surgery on L4 and L5          CURRENT MEDICATIONS     Previous Medications    AMLODIPINE (NORVASC) 2.5 MG TABLET    Take 1 tablet by mouth 2 times daily    ASPIRIN 81 MG CHEWABLE TABLET    Take 81 mg by mouth    BLOOD GLUCOSE TEST STRIPS (ASCENSIA AUTODISC VI;ONE TOUCH ULTRA TEST VI) STRIP    1 each by In Vitro route daily As needed. CARVEDILOL (COREG) 12.5 MG TABLET    Take 1 tablet by mouth 2 times daily (with meals)    FUROSEMIDE (LASIX) 20 MG TABLET    Take 1 tablet by mouth daily as needed (leg swelling)    GABAPENTIN (NEURONTIN) 300 MG CAPSULE    Take 2 capsules by mouth 2 times daily for 90 days. GLYCERIN, LAXATIVE, (GLYCERIN ADULT) 2.1 G SUPPOSITORY    Place 1 suppository rectally as needed (constipation)    HYDRALAZINE (APRESOLINE) 25 MG TABLET    Take 0.5 tablets by mouth 2 times daily    LANCETS MISC    Test sugars once daily. R73.03    LISINOPRIL (PRINIVIL;ZESTRIL) 2.5 MG TABLET    Take 1 tablet by mouth daily    POLYETHYLENE GLYCOL (GLYCOLAX) 17 G PACKET    Take 17 g by mouth daily    SENNA (SENOKOT) 8.6 MG TABLET    Take 1 tablet by mouth 2 times daily as needed for Constipation       ALLERGIES     Atorvastatin, Codeine, Diltiazem hcl, Levofloxacin, Pregabalin, and Simvastatin    FAMILY HISTORY     History reviewed. No pertinent family history. SOCIAL HISTORY       Social History     Socioeconomic History    Marital status:       Spouse name: None    Number of children: None    Years of education: None    Highest education level: None   Occupational History    None   Social Needs    Financial resource strain: None  Food insecurity     Worry: None     Inability: None    Transportation needs     Medical: None     Non-medical: None   Tobacco Use    Smoking status: Never Smoker    Smokeless tobacco: Never Used   Substance and Sexual Activity    Alcohol use: No    Drug use: No    Sexual activity: Not Currently     Partners: Male   Lifestyle    Physical activity     Days per week: None     Minutes per session: None    Stress: None   Relationships    Social connections     Talks on phone: None     Gets together: None     Attends Hoahaoism service: None     Active member of club or organization: None     Attends meetings of clubs or organizations: None     Relationship status: None    Intimate partner violence     Fear of current or ex partner: None     Emotionally abused: None     Physically abused: None     Forced sexual activity: None   Other Topics Concern    None   Social History Narrative    None       SCREENINGS    [place] Coma Scale  Eye Opening: Spontaneous  Best Verbal Response: Oriented  Best Motor Response: Obeys commands  [place] Coma Scale Score: 15        PHYSICAL EXAM    (up to 7 for level 4, 8 or more for level 5)     ED Triage Vitals [02/11/21 1627]   BP Temp Temp Source Pulse Resp SpO2 Height Weight   -- 96 °F (35.6 °C) Tympanic 78 -- 95 % 5' (1.524 m) 110 lb (49.9 kg)       Physical Exam  Vitals signs and nursing note reviewed. Constitutional:       General: She is not in acute distress. Appearance: Normal appearance. She is not ill-appearing or toxic-appearing. HENT:      Head: Normocephalic and atraumatic. Nose: Nose normal. No congestion. Mouth/Throat:      Mouth: Mucous membranes are moist.   Eyes:      General:         Right eye: No discharge. Left eye: No discharge. Conjunctiva/sclera: Conjunctivae normal.   Neck:      Musculoskeletal: Normal range of motion. Cardiovascular:      Rate and Rhythm: Normal rate and regular rhythm. Pulses: Normal pulses. Heart sounds: Murmur present. No friction rub. Pulmonary:      Effort: Pulmonary effort is normal. No respiratory distress. Breath sounds: Examination of the left-lower field reveals rales. Rales present. No wheezing. Abdominal:      General: Abdomen is flat. There is no distension. Palpations: Abdomen is soft. Tenderness: There is no abdominal tenderness. Musculoskeletal: Normal range of motion. General: No deformity or signs of injury. Right lower leg: Edema present. Left lower leg: Edema present. Skin:     General: Skin is warm and dry. Capillary Refill: Capillary refill takes less than 2 seconds. Findings: No rash. Neurological:      General: No focal deficit present. Mental Status: She is alert. Mental status is at baseline. Motor: No weakness. Comments: Speaking normally. No facial asymmetry. Moving all 4 extremities. Normal gait.     Psychiatric:         Mood and Affect: Mood normal.         EMERGENCY DEPARTMENT COURSE and DIFFERENTIAL DIAGNOSIS/MDM:   Vitals:    Vitals:    02/11/21 1735 02/11/21 1810 02/11/21 1815 02/11/21 1830   BP: (!) 150/52 (!) 150/56 (!) 165/49 (!) 153/60   Pulse: 73 74 74 74   Resp: 25 28 19 19   Temp:       TempSrc:       SpO2: 96% 97% 98%    Weight:       Height: Patient presents to the emergency department with a complaint described above. Vital signs are grossly unremarkable, she is nontoxic and she is resting comfortably in bed. She does have a little bit of increased work of breathing the more that she talks, she has to sort of stop and catch her breath. She does have 2+ pitting edema bilaterally in the lower extremities. Review of her medical record shows that she had an echo in 2019 which showed some left ventricular hypertrophy, preserved ejection fraction, some regurg across multiple valves and a PFO. At this time I do have some concern for new onset decompensated heart failure, lower suspicion for an acute coronary syndrome or pulmonary embolus as cause or associate diagnosis. I am however getting twelve-lead EKG and routine blood work that includes cardiac enzymes and D-dimer. I will reevaluate      DIAGNOSTIC RESULTS     Twelve lead EKG interpreted by myself:  A 12 lead EKG done at 1631, interpreted by myself, showed a regular rhythm at a rate of 74bpm.  The KS interval was normal.  The QRS complex was widened consistent with RBB. There was no ST segment elevation or depression, T wave inversion not present but there are some changes with right bundle branch block.   QRS progression through precordial leads was grossly normal.  Interpretation: Normal sinus rhythm, right bundle branch block morphology noted no ST segment changes or pattern consistent with acute ischemia or infarct    LABS:  Labs Reviewed   CBC WITH AUTO DIFFERENTIAL - Abnormal; Notable for the following components:       Result Value    RBC 3.13 (*)     Hemoglobin 8.1 (*)     Hematocrit 27.6 (*)     RDW 16.1 (*)     Lymphocytes 23 (*)     All other components within normal limits   COMPREHENSIVE METABOLIC PANEL - Abnormal; Notable for the following components:    Glucose 160 (*)     Bun/Cre Ratio 21 (*)     Albumin 3.3 (*)     All other components within normal limits D-DIMER, QUANTITATIVE - Abnormal; Notable for the following components:    D-Dimer, Quant 3.26 (*)     All other components within normal limits   TROPONIN - Abnormal; Notable for the following components:    Troponin, High Sensitivity 86 (*)     All other components within normal limits   PROTIME-INR - Abnormal; Notable for the following components:    Protime 14.9 (*)     All other components within normal limits   BRAIN NATRIURETIC PEPTIDE - Abnormal; Notable for the following components:    Pro- (*)     All other components within normal limits   COVID-19 - Abnormal; Notable for the following components:    SARS-CoV-2, Rapid DETECTED (*)     All other components within normal limits   TROPONIN - Abnormal; Notable for the following components:    Troponin, High Sensitivity 77 (*)     All other components within normal limits   MAGNESIUM   APTT   URINE RT REFLEX TO CULTURE   POC FECAL OCCULT BLOOD       All other labs were within normal range or not returned as of this dictation. RADIOLOGY:  CT CHEST PULMONARY EMBOLISM W CONTRAST   Final Result   1. No pulmonary embolism. 2. Small to moderate volume bilateral pleural effusion with bibasilar   consolidation, greater on the left than right. Pneumonia is a consideration   versus sequela from congestive heart failure. There is not much in the way   of vascular engorgement or interstitial edema on this exam.   3. Calcific atherosclerosis aorta and coronary arteries. 4. Mild cardiac enlargement. ED Course as of Feb 11 1901   Thu Feb 11, 2021   1712 Hemoglobin noted to be 8.1 today which is a steady trend down, it was 9.5 back in October, it was 11 before that.   Will do rectal exam    [TS]   1721 FOBT negative    [TS]   4727 Patient has an elevated D-dimer, creatinine normal.  Have ordered CT for PE    [TS]   1733 Initial troponin elevated at 86, BNP is 850, previous troponins have been a little bit below 40    [TS] 5727 Patient does have positive Covid test, however, it is reported that she had Covid back in December. [TS]   6143 CT scan shows no PE and has evidence of decompensated heart failure. Patient will need repeat troponin, have ordered Lasix    [TS]   1859 Repeat troponin is lower than the first.  At this time we will get her admitted for acute decompensated heart failure    [TS]      ED Course User Index  [TS] Kathrin Garza DO         PROCEDURES:  Unless otherwise noted below, none     Procedures    FINAL IMPRESSION      1. New onset of congestive heart failure ([address]    2. Anemia, unspecified type          DISPOSITION/PLAN   DISPOSITION Decision To Admit 02/11/2021 07:00:07 PM      PATIENT REFERRED TO:  No follow-up provider specified.     DISCHARGE MEDICATIONS:  New Prescriptions    No medications on file          (Please note that portions of this note were completed with a voice recognition program.  Efforts were made to edit the dictations but occasionally words are mis-transcribed.)    Kathrin Garza DO (electronically signed)  Board Certified Emergency Physician         Kathrin Garza DO  02/11/21 8316

## 2021-02-11 NOTE — FLOWSHEET NOTE
rounding in ED    Assessment: Patient returning from CT scan. Patient's daughter in room with her. Patient having breathing difficulties. Patient welcomes prayer. Intervention:  engaged in active listening, caring conversation and prayer. Outcome: Patient and daughter expressed appreciation for visit and prayer. Daughter stated she will contact patient's  once they know more. Plan: Chaplains will remain available to offer spiritual and emotional support as needed. 02/11/21 1811   Encounter Summary   Services provided to: Patient and family together   Referral/Consult From: [facility] Family members; Evangelical/andrew community   [facility] [address]   Continue Visiting   (2/11/21)   Complexity of Encounter Low   Length of Encounter 15 minutes   Spiritual Assessment Completed Yes   Spiritual/Moravian   Type Spiritual support   Assessment Approachable   Intervention Explored feelings, thoughts, concerns;Prayer; Active listening   Outcome Engaged in conversation;Expressed gratitude   Electronically signed by Jasper Greenberg on 2/11/2021 at 6:25 PM

## 2021-02-11 NOTE — PROGRESS NOTES
carvedilol (COREG) 12.5 MG tablet Take 1 tablet by mouth 2 times daily (with meals) Yes Cedrick Sifuentes MD   amLODIPine (NORVASC) 2.5 MG tablet Take 1 tablet by mouth 2 times daily Yes Cedrick Sifuentes MD   lisinopril (PRINIVIL;ZESTRIL) 2.5 MG tablet Take 1 tablet by mouth daily Yes Cedrick Sifuentes MD   furosemide (LASIX) 20 MG tablet Take 1 tablet by mouth daily as needed (leg swelling) Yes Cedrick Sifuentes MD   Glycerin, Laxative, (GLYCERIN ADULT) 2.1 g suppository Place 1 suppository rectally as needed (constipation) Yes Cedrick Sifuentes MD   hydrALAZINE (APRESOLINE) 25 MG tablet Take 0.5 tablets by mouth 2 times daily Yes Cedrick Sifuentes MD   rOPINIRole (REQUIP) 0.25 MG tablet TAKE 1 TABLET BY MOUTH NIGHTLY Yes Cedrick Sifuentes MD   polyethylene glycol (GLYCOLAX) 17 g packet Take 17 g by mouth daily Yes Historical Provider, MD   senna (SENOKOT) 8.6 MG tablet Take 1 tablet by mouth 2 times daily as needed for Constipation Yes Cedrick Sifuentes MD   blood glucose test strips (ASCENSIA AUTODISC VI;ONE TOUCH ULTRA TEST VI) strip 1 each by In Vitro route daily As needed. Yes Cedrick Sifuentes MD   Lancets MISC Test sugars once daily. R73.03 Yes Cedrick Sifuentes MD   ezetimibe (ZETIA) 10 MG tablet TAKE 1 TABLET DAILY Yes Cedrick Sifuentes MD   mirtazapine (REMERON) 15 MG tablet TAKE 1 TABLET NIGHTLY Yes Cedrick Sifuentes MD   aspirin 81 MG chewable tablet Take 81 mg by mouth Yes Historical Provider, MD   sodium chloride 1 g tablet Take 1 tablet by mouth 2 times daily (with meals) for 5 days  Patient taking differently: Take 1 g by mouth 3 times daily   Jolene Pacheco MD   gabapentin (NEURONTIN) 300 MG capsule Take 2 capsules by mouth 2 times daily for 90 days.   Cedrick Sifuentes MD       Social History     Tobacco Use    Smoking status: Never Smoker    Smokeless tobacco: Never Used   Substance Use Topics    Alcohol use: No    Drug use: No        Allergies   Allergen Reactions    Atorvastatin Skin:        [x] No significant exanthematous lesions or discoloration noted on facial skin         [] Abnormal-            Psychiatric:       [x] Normal Affect [x] No Hallucinations        [] Abnormal-     Other pertinent observable physical exam findings-     ASSESSMENT/PLAN:  1. Leg swelling  New; I am very concerned about new onset CHF. I was planning to order an echo, labs and CXR to evaluate, but while I was talking to the patient and going over my plan her granddaughter found the pulse ox and I had her do a walking pulse ox on [employer] L[employer] and her O2 sat dropped down to 78% so I advised them to take her to the ER immediately for a work up.     - ECHO Complete 2D W Doppler W Color  - Basic Metabolic Panel; Future    2. Shortness of breath  Worsening; as above I am very concerned about new onset CHF    - ECHO Complete 2D W Doppler W Color  - XR CHEST STANDARD (2 VW); Future    3. Essential hypertension  Stable; her blood pressure is well controlled    4. Hyponatremia  I have not checked her sodium level in 2 months so I ordered a bmp to know if I should continue her on the sodium tablets. - Basic Metabolic Panel; Future      Return in about 1 month (around 3/11/2021) for leg swelling. Ferman Kocher is a 80 y.o. female being evaluated by a Virtual Visit (video visit) encounter to address concerns as mentioned above. A caregiver was present when appropriate. Due to this being a TeleHealth encounter (During WXLST-31 public health emergency), evaluation of the following organ systems was limited: Vitals/Constitutional/EENT/Resp/CV/GI//MS/Neuro/Skin/Heme-Lymph-Imm. Pursuant to the emergency declaration under the [address] [place] [address] and the [employer] and [employer] Act, this Virtual Visit was conducted with patient's (and/or legal guardian's) consent, to reduce the patient's risk of exposure to COVID-19 and provide necessary medical care. The patient (and/or legal guardian) has also been advised to contact this office for worsening conditions or problems, and seek emergency medical treatment and/or call 911 if deemed necessary. Patient identification was verified at the start of the visit: Yes    Total time spent on this encounter: Not billed by time    Services were provided through a video synchronous discussion virtually to substitute for in-person clinic visit. --Estrada Gao MD on 2/11/2021 at 5:21 PM    An electronic signature was used to authenticate this note.

## 2021-02-11 NOTE — ED NOTES
Writer assisted patient to lay on her left side for rectal exam.  Exam completed per Dr. Zach Cyr. Patient tolerated well. She turns herself back onto her back and positions for comfort. Writer adjusted the head of bed to her liking. Patient appears and verbalizes feeling short of breath from this activity. SpO2 down to 93% on room air but no lower. Writer applied oxygen at 2lpm via NC to see if it would help with her shortness of breath. Side rail remains up x1 with call light in reach. Daughter at bedside.       Yesenia Oliver, RN  02/11/21 [id number] [facility] Estrella Hernandez RN  02/11/21 6676

## 2021-02-12 ENCOUNTER — APPOINTMENT (OUTPATIENT)
Dept: GENERAL RADIOLOGY | Age: 86
DRG: 291 | End: 2021-02-12
Payer: MEDICARE

## 2021-02-12 PROBLEM — Q21.12 PFO (PATENT FORAMEN OVALE): Status: ACTIVE | Noted: 2021-02-12

## 2021-02-12 PROBLEM — I21.4 NSTEMI (NON-ST ELEVATED MYOCARDIAL INFARCTION) (HCC): Status: ACTIVE | Noted: 2020-12-26

## 2021-02-12 PROBLEM — R55 VASOVAGAL SYNCOPE: Status: ACTIVE | Noted: 2021-01-01

## 2021-02-12 PROBLEM — M25.512 CHRONIC LEFT SHOULDER PAIN: Status: ACTIVE | Noted: 2021-02-12

## 2021-02-12 PROBLEM — J44.9 COPD (CHRONIC OBSTRUCTIVE PULMONARY DISEASE) (HCC): Status: ACTIVE | Noted: 2021-02-12

## 2021-02-12 PROBLEM — G89.29 CHRONIC LEFT SHOULDER PAIN: Status: ACTIVE | Noted: 2021-02-12

## 2021-02-12 PROBLEM — Z85.820 HISTORY OF MALIGNANT MELANOMA OF SKIN: Status: ACTIVE | Noted: 2021-02-12

## 2021-02-12 PROBLEM — U07.1 COVID-19: Status: ACTIVE | Noted: 2020-12-26
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
## 2021-02-12 LAB
ANION GAP SERPL CALCULATED.3IONS-SCNC: 9 MMOL/L (ref 9–17)
BNP INTERPRETATION: ABNORMAL
BUN BLDV-MCNC: 17 MG/DL (ref 8–23)
BUN/CREAT BLD: 19 (ref 9–20)
CALCIUM SERPL-MCNC: 8.4 MG/DL (ref 8.6–10.4)
CHLORIDE BLD-SCNC: 105 MMOL/L (ref 98–107)
CHOLESTEROL/HDL RATIO: 1.6
CHOLESTEROL: 77 MG/DL
CO2: 25 MMOL/L (ref 20–31)
CREAT SERPL-MCNC: 0.9 MG/DL (ref 0.5–0.9)
ESTIMATED AVERAGE GLUCOSE: 123 MG/DL
GFR AFRICAN AMERICAN: >60 ML/MIN
GFR NON-AFRICAN AMERICAN: 59 ML/MIN
GFR SERPL CREATININE-BSD FRML MDRD: ABNORMAL ML/MIN/{1.73_M2}
GFR SERPL CREATININE-BSD FRML MDRD: ABNORMAL ML/MIN/{1.73_M2}
GLUCOSE BLD-MCNC: 91 MG/DL (ref 70–99)
HBA1C MFR BLD: 5.9 % (ref 4–6)
HCT VFR BLD CALC: 23.5 % (ref 36.3–47.1)
HCT VFR BLD CALC: 23.9 % (ref 36.3–47.1)
HCT VFR BLD CALC: 24.3 % (ref 36.3–47.1)
HDLC SERPL-MCNC: 49 MG/DL
HEMOGLOBIN: 6.8 G/DL (ref 11.9–15.1)
HEMOGLOBIN: 7 G/DL (ref 11.9–15.1)
HEMOGLOBIN: 7 G/DL (ref 11.9–15.1)
IRON SATURATION: 6 % (ref 20–55)
IRON: 17 UG/DL (ref 37–145)
LDL CHOLESTEROL: 19 MG/DL (ref 0–130)
MAGNESIUM: 2 MG/DL (ref 1.6–2.6)
MCH RBC QN AUTO: 25.5 PG (ref 25.2–33.5)
MCHC RBC AUTO-ENTMCNC: 28.8 G/DL (ref 25.2–33.5)
MCV RBC AUTO: 88.4 FL (ref 82.6–102.9)
NRBC AUTOMATED: 0 PER 100 WBC
PDW BLD-RTO: 16.2 % (ref 11.8–14.4)
PLATELET # BLD: 254 K/UL (ref 138–453)
PMV BLD AUTO: 10.7 FL (ref 8.1–13.5)
POTASSIUM SERPL-SCNC: 4.2 MMOL/L (ref 3.7–5.3)
PRO-BNP: 1352 PG/ML
RBC # BLD: 2.75 M/UL (ref 3.95–5.11)
SODIUM BLD-SCNC: 139 MMOL/L (ref 135–144)
TOTAL IRON BINDING CAPACITY: 266 UG/DL (ref 250–450)
TRIGL SERPL-MCNC: 44 MG/DL
TROPONIN INTERP: ABNORMAL
TROPONIN T: ABNORMAL NG/ML
TROPONIN, HIGH SENSITIVITY: 75 NG/L (ref 0–14)
TSH SERPL DL<=0.05 MIU/L-ACNC: 2.35 MIU/L (ref 0.3–5)
UNSATURATED IRON BINDING CAPACITY: 249 UG/DL (ref 112–347)
VLDLC SERPL CALC-MCNC: NORMAL MG/DL (ref 1–30)
WBC # BLD: 5 K/UL (ref 3.5–11.3)

## 2021-02-12 PROCEDURE — 71045 X-RAY EXAM CHEST 1 VIEW: CPT

## 2021-02-12 PROCEDURE — APPSS45 APP SPLIT SHARED TIME 31-45 MINUTES: Performed by: NURSE PRACTITIONER

## 2021-02-12 PROCEDURE — 83880 ASSAY OF NATRIURETIC PEPTIDE: CPT

## 2021-02-12 PROCEDURE — 2580000003 HC RX 258: Performed by: NURSE PRACTITIONER

## 2021-02-12 PROCEDURE — 80048 BASIC METABOLIC PNL TOTAL CA: CPT

## 2021-02-12 PROCEDURE — 84443 ASSAY THYROID STIM HORMONE: CPT

## 2021-02-12 PROCEDURE — 6370000000 HC RX 637 (ALT 250 FOR IP): Performed by: NURSE PRACTITIONER

## 2021-02-12 PROCEDURE — 6370000000 HC RX 637 (ALT 250 FOR IP): Performed by: INTERNAL MEDICINE

## 2021-02-12 PROCEDURE — 94761 N-INVAS EAR/PLS OXIMETRY MLT: CPT

## 2021-02-12 PROCEDURE — P9016 RBC LEUKOCYTES REDUCED: HCPCS

## 2021-02-12 PROCEDURE — 86900 BLOOD TYPING SEROLOGIC ABO: CPT

## 2021-02-12 PROCEDURE — 86901 BLOOD TYPING SEROLOGIC RH(D): CPT

## 2021-02-12 PROCEDURE — 97161 PT EVAL LOW COMPLEX 20 MIN: CPT

## 2021-02-12 PROCEDURE — 85027 COMPLETE CBC AUTOMATED: CPT

## 2021-02-12 PROCEDURE — 36430 TRANSFUSION BLD/BLD COMPNT: CPT

## 2021-02-12 PROCEDURE — 85014 HEMATOCRIT: CPT

## 2021-02-12 PROCEDURE — 86850 RBC ANTIBODY SCREEN: CPT

## 2021-02-12 PROCEDURE — 80061 LIPID PANEL: CPT

## 2021-02-12 PROCEDURE — 99222 1ST HOSP IP/OBS MODERATE 55: CPT | Performed by: INTERNAL MEDICINE

## 2021-02-12 PROCEDURE — 83735 ASSAY OF MAGNESIUM: CPT

## 2021-02-12 PROCEDURE — 2060000000 HC ICU INTERMEDIATE R&B

## 2021-02-12 PROCEDURE — 97165 OT EVAL LOW COMPLEX 30 MIN: CPT

## 2021-02-12 PROCEDURE — 36415 COLL VENOUS BLD VENIPUNCTURE: CPT

## 2021-02-12 PROCEDURE — 94664 DEMO&/EVAL PT USE INHALER: CPT

## 2021-02-12 PROCEDURE — 6360000002 HC RX W HCPCS: Performed by: NURSE PRACTITIONER

## 2021-02-12 PROCEDURE — 86920 COMPATIBILITY TEST SPIN: CPT

## 2021-02-12 PROCEDURE — 2700000000 HC OXYGEN THERAPY PER DAY

## 2021-02-12 PROCEDURE — 85018 HEMOGLOBIN: CPT

## 2021-02-12 PROCEDURE — 94150 VITAL CAPACITY TEST: CPT

## 2021-02-12 PROCEDURE — 84484 ASSAY OF TROPONIN QUANT: CPT

## 2021-02-12 RX ORDER — ALBUTEROL SULFATE 2.5 MG/3ML
2.5 SOLUTION RESPIRATORY (INHALATION)
Status: DISCONTINUED | OUTPATIENT
Start: 2021-02-12 | End: 2021-02-12

## 2021-02-12 RX ORDER — ROPINIROLE 0.25 MG/1
0.25 TABLET, FILM COATED ORAL 2 TIMES DAILY
Status: DISCONTINUED | OUTPATIENT
Start: 2021-02-12 | End: 2021-02-14 | Stop reason: HOSPADM

## 2021-02-12 RX ORDER — ROPINIROLE 0.25 MG/1
0.25 TABLET, FILM COATED ORAL NIGHTLY
Status: DISCONTINUED | OUTPATIENT
Start: 2021-02-12 | End: 2021-02-12

## 2021-02-12 RX ORDER — OXYCODONE HYDROCHLORIDE AND ACETAMINOPHEN 5; 325 MG/1; MG/1
1 TABLET ORAL EVERY 6 HOURS PRN
Status: DISCONTINUED | OUTPATIENT
Start: 2021-02-12 | End: 2021-02-14 | Stop reason: HOSPADM

## 2021-02-12 RX ORDER — AMLODIPINE BESYLATE 5 MG/1
5 TABLET ORAL 2 TIMES DAILY
Status: DISCONTINUED | OUTPATIENT
Start: 2021-02-12 | End: 2021-02-14 | Stop reason: HOSPADM

## 2021-02-12 RX ORDER — OXYCODONE HYDROCHLORIDE AND ACETAMINOPHEN 5; 325 MG/1; MG/1
2 TABLET ORAL EVERY 6 HOURS PRN
Status: DISCONTINUED | OUTPATIENT
Start: 2021-02-12 | End: 2021-02-14 | Stop reason: HOSPADM

## 2021-02-12 RX ORDER — SODIUM CHLORIDE 9 MG/ML
INJECTION, SOLUTION INTRAVENOUS PRN
Status: DISCONTINUED | OUTPATIENT
Start: 2021-02-12 | End: 2021-02-14 | Stop reason: HOSPADM

## 2021-02-12 RX ORDER — ISOSORBIDE MONONITRATE 30 MG/1
30 TABLET, EXTENDED RELEASE ORAL DAILY
Status: DISCONTINUED | OUTPATIENT
Start: 2021-02-12 | End: 2021-02-14 | Stop reason: HOSPADM

## 2021-02-12 RX ORDER — ALBUTEROL SULFATE 2.5 MG/3ML
2.5 SOLUTION RESPIRATORY (INHALATION) EVERY 6 HOURS PRN
Status: DISCONTINUED | OUTPATIENT
Start: 2021-02-12 | End: 2021-02-14 | Stop reason: HOSPADM

## 2021-02-12 RX ORDER — SODIUM CHLORIDE FOR INHALATION 0.9 %
3 VIAL, NEBULIZER (ML) INHALATION
Status: DISCONTINUED | OUTPATIENT
Start: 2021-02-12 | End: 2021-02-14 | Stop reason: HOSPADM

## 2021-02-12 RX ADMIN — FUROSEMIDE 20 MG: 10 INJECTION, SOLUTION INTRAMUSCULAR; INTRAVENOUS at 17:00

## 2021-02-12 RX ADMIN — CARVEDILOL 12.5 MG: 12.5 TABLET, FILM COATED ORAL at 08:00

## 2021-02-12 RX ADMIN — POLYETHYLENE GLYCOL 3350 17 G: 17 POWDER, FOR SOLUTION ORAL at 09:07

## 2021-02-12 RX ADMIN — MELATONIN 3 MG: 3 TAB ORAL at 21:17

## 2021-02-12 RX ADMIN — ROPINIROLE HYDROCHLORIDE 0.25 MG: 0.25 TABLET, FILM COATED ORAL at 12:56

## 2021-02-12 RX ADMIN — GABAPENTIN 600 MG: 300 CAPSULE ORAL at 09:06

## 2021-02-12 RX ADMIN — CARVEDILOL 12.5 MG: 12.5 TABLET, FILM COATED ORAL at 17:00

## 2021-02-12 RX ADMIN — HYDRALAZINE HYDROCHLORIDE 12.5 MG: 25 TABLET, FILM COATED ORAL at 09:06

## 2021-02-12 RX ADMIN — SODIUM CHLORIDE TAB 1 GM 1 G: 1 TAB at 17:00

## 2021-02-12 RX ADMIN — GABAPENTIN 600 MG: 300 CAPSULE ORAL at 21:12

## 2021-02-12 RX ADMIN — AMLODIPINE BESYLATE 5 MG: 5 TABLET ORAL at 09:07

## 2021-02-12 RX ADMIN — SODIUM CHLORIDE TAB 1 GM 1 G: 1 TAB at 08:00

## 2021-02-12 RX ADMIN — LISINOPRIL 2.5 MG: 2.5 TABLET ORAL at 09:06

## 2021-02-12 RX ADMIN — OXYCODONE AND ACETAMINOPHEN 2 TABLET: 5; 325 TABLET ORAL at 10:43

## 2021-02-12 RX ADMIN — FUROSEMIDE 20 MG: 10 INJECTION, SOLUTION INTRAMUSCULAR; INTRAVENOUS at 09:07

## 2021-02-12 RX ADMIN — ISOSORBIDE MONONITRATE 30 MG: 30 TABLET, EXTENDED RELEASE ORAL at 09:06

## 2021-02-12 RX ADMIN — ROPINIROLE HYDROCHLORIDE 0.25 MG: 0.25 TABLET, FILM COATED ORAL at 20:46

## 2021-02-12 RX ADMIN — SODIUM CHLORIDE, PRESERVATIVE FREE 10 ML: 5 INJECTION INTRAVENOUS at 09:07

## 2021-02-12 RX ADMIN — SODIUM CHLORIDE, PRESERVATIVE FREE 10 ML: 5 INJECTION INTRAVENOUS at 21:12

## 2021-02-12 ASSESSMENT — PAIN SCALES - GENERAL: PAINLEVEL_OUTOF10: 6

## 2021-02-12 NOTE — FLOWSHEET NOTE
Julieta Pierce from lab called critical HGB of 7.0. Updated CNP [address] of result. Awaiting response.

## 2021-02-12 NOTE — H&P
HOSPITALIST ADMISSION H&P      REASON FOR ADMISSION:  Dyspnea on exertion -- CHF, anemia  ESTIMATED LENGTH OF STAY:>2 midnights, 3-4 days    ATTENDING/ADMITTING PHYSICIAN: Ean Fajardo MD  PCP: Brielle Boss MD    HISTORY OF PRESENT ILLNESS:      The patient is a 80 y.o. female patient of Brielle Boss MD who presents from the ER with c/o gradually worsening dyspnea on exertion over the past 2-3 weeks. She also reports increase in bilateral lower leg edema. She has had a nonproductive cough. She denies fevers, chest pain, vomiting, or diarrhea. She had a virtual visit with her PCP on 02//11/2021 for these complaints and was directed to the ER when pulse oximetry noted hypoxia at 78% with ambulation during the virtual visit. The patients daughter recently declined [facility]. She has a history of CAD with RCA cardiac stent placement in 2002. She was hospitalized at [place] [address] 12/26/2020-01/03/2021 with hyponatremia, covid-19 (tested positive at [facility] ER 12/26/2020 per report), NSTEMI, metabolic encephalopathy, and vasovagal syncope. She was seen by cardiology, nephrology, and neurology during this hospitalization. She was started on imdur, coreg dose was increased, HCTZ was discontinued, and lisinopril dose was decreased. She was also seen by orthopedics for her left shoulder pain. In ER, her D. Dimer was elevated at 3.26, CTA of her chest impression: 1. No pulmonary embolism. 2. Small to moderate volume bilateral pleural effusion with bibasilar consolidation, greater on the left than right. Pneumonia is a consideration versus sequela from congestive heart failure. 3. Calcific atherosclerosis aorta and coronary arteries. 4. Mild cardiac enlargement. Troponin 86 --> 77, EKG showed NSR with right BBB, proBNP 850 --> 1352. Anemia -- with downward trend -- 8.1 --> 7. Stool for occult blood negative in ER. While at [facility] hemoglobin 11.2 --> 8.8.     DMII Hypertension    Silver Dodson    Depression/anxiety/insomnia    Chronic pain syndrome -- takes percocet 7.5 mg occasionally when back pain flares up (prescribed by PCP)    Memory loss/mild dementia     See below for additional PMH. Patient cwjq-soznwlnxqe-qwviipgj-available records reviewed, including, but not limited to ER records, imaging results, lab results, office records, personal records, and OARRS -- no signs of abuse or diversion -- OD risk score 280. 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     L4 & L5    Cancer (HCC)     skin cancers    Depression     History of heart artery stent     Hyperlipidemia     Hypertension     Nerve pain            Past Surgical History:   Procedure Laterality Date    APPENDECTOMY  56    CARDIAC CATHETERIZATION  2002    with stent placement    CATARACT REMOVAL WITH IMPLANT Bilateral     2005 and 1998; lens implant bilat    LUMBAR One Arch Pb SURGERY  2012    lumbar surgery on L4 and L5        Medications Prior to Admission:    Medications Prior to Admission: carvedilol (COREG) 12.5 MG tablet, Take 1 tablet by mouth 2 times daily (with meals)  amLODIPine (NORVASC) 2.5 MG tablet, Take 1 tablet by mouth 2 times daily  lisinopril (PRINIVIL;ZESTRIL) 2.5 MG tablet, Take 1 tablet by mouth daily  furosemide (LASIX) 20 MG tablet, Take 1 tablet by mouth daily as needed (leg swelling)  hydrALAZINE (APRESOLINE) 25 MG tablet, Take 0.5 tablets by mouth 2 times daily  polyethylene glycol (GLYCOLAX) 17 g packet, Take 17 g by mouth daily  senna (SENOKOT) 8.6 MG tablet, Take 1 tablet by mouth 2 times daily as needed for Constipation  gabapentin (NEURONTIN) 300 MG capsule, Take 2 capsules by mouth 2 times daily for 90 days.   aspirin 81 MG chewable tablet, Take 81 mg by mouth  sodium chloride 1 g tablet, Take 1 tablet by mouth 2 times daily (with meals) Glycerin, Laxative, (GLYCERIN ADULT) 2.1 g suppository, Place 1 suppository rectally as needed (constipation)  blood glucose test strips (ASCENSIA AUTODISC VI;ONE TOUCH ULTRA TEST VI) strip, 1 each by In Vitro route daily As needed. Lancets MISC, Test sugars once daily. R73.03    Allergies:    Atorvastatin, Codeine, Diltiazem hcl, Levofloxacin, Pregabalin, and Simvastatin    Social History:    reports that she has never smoked. She has never used smokeless tobacco. She reports that she does not drink alcohol or use drugs. Family History:   family history includes Cancer in her natural sibling; Diabetes in her natural sibling; Heart Disease in her natural sibling; Hypertension in her father and mother; Stroke in her father. REVIEW OF SYSTEMS:  See HPI and problem list; otherwise no other new complaints with respect to eyes, ENT, neck, pulmonary, coronary, chest, GI, , endocrine, musculoskeletal, immune system/connective tissue disease, hematologic, neurologic, psychiatric, skin, lymphatics, or malignancies. Code status will need to be discussed with patient's daughter -- Full Code at this time. PHYSICAL EXAM:  Vitals:  BP (!) 147/45   Pulse 66   Temp 96.9 °F (36.1 °C) (Tympanic)   Resp 14   Ht 5' (1.524 m)   Wt 111 lb 6.4 oz (50.5 kg)   SpO2 96%   BMI 21.76 kg/m²     General: awake, alert and cooperative  HEENT: External nose normal, Normocephalic, Atraumatic and Neck with full ROM  Neck: Supple, Carotid Pulses Present, No Masses, Tenderness, Nodularity and No Lymphadenopathy  Chest/Lungs: bases diminished bilaterally -- with dypsnea on exertion, Clear to Auscultation without Rales, Rhonchi, or Wheezes and Respirations even and unlabored, occasional dry cough noted  Cardiac: Regular Rate and Rhythm and Systolic Murmur Present  GI/Abdomen:  Bowel Sounds Present and Soft, Non-tender, without Guarding or Rebound Tenderness  : Not examined Extremities/Musculoskeletal: BLE with 2+ edema and Generalized weakness   Skin: No Cyanosis, No rash and Skin warm and dry  Neuro: Dementia at baseline, Alert and Oriented, to Person, to Place, to Situation, No Localizing Signs/Symptoms, follows commands with all 4 extremities and Strength equal bilaterally -- ambulates with walker at times      LABS:    CBC with Differential:    Lab Results   Component Value Date    WBC 5.0 02/12/2021    RBC 2.75 02/12/2021    HGB 7.0 02/12/2021    HCT 24.3 02/12/2021     02/12/2021    MCV 88.4 02/12/2021    MCH 25.5 02/12/2021    MCHC 28.8 02/12/2021    RDW 16.2 02/12/2021    LYMPHOPCT 23 02/11/2021    MONOPCT 10 02/11/2021    BASOPCT 0 02/11/2021    MONOSABS 0.72 02/11/2021    LYMPHSABS 1.74 02/11/2021    EOSABS 0.11 02/11/2021    BASOSABS 0.03 02/11/2021    DIFFTYPE NOT REPORTED 02/11/2021     CMP:    Lab Results   Component Value Date     02/11/2021    K 4.5 02/11/2021     02/11/2021    CO2 22 02/11/2021    BUN 18 02/11/2021    CREATININE 0.87 02/11/2021    GFRAA >60 02/11/2021    LABGLOM >60 02/11/2021    GLUCOSE 160 02/11/2021    PROT 6.6 02/11/2021    LABALBU 3.3 02/11/2021    CALCIUM 8.7 02/11/2021    BILITOT 0.33 02/11/2021    ALKPHOS 69 02/11/2021    AST 28 02/11/2021    ALT 17 02/11/2021       ASSESSMENT:      Patient Active Problem List   Diagnosis    Memory loss    Essential hypertension    Psychophysiological insomnia    Therapeutic opioid-induced constipation (OIC)    Anxiety    Chronic bilateral low back pain    Chronic coronary artery disease    Dry eye syndrome of both lacrimal glands    Early stage nonexudative age-related macular degeneration of both eyes    Endothelial corneal dystrophy    Gastroesophageal reflux disease    Hyperlipidemia, mixed    Prediabetes    Shortness of breath    Constipation    Hyponatremia    New onset of congestive heart failure (HCC)    History of malignant melanoma of skin  [facility]ch[facility] obstructive [facility] disease) [facility]    PFO (patent foramen ovale)    COVID-19    Chronic left shoulder pain    Vasovagal syncope    NSTEMI (non-ST elevated myocardial infarction) ([facility] [address]    Fall     Patient myyk-dywveruboy-nhlpxiuq-available records reviewed, including, but not limited to, ER reports--labs--EKGs---OSH reports--OS reports---personal notes     KHAI DIAZ    89 [address]  [HERNAN Novak;  [facility]]      SUPPLEMENTAL OXYGEN---2 liters   FULL CODE   Genscottie Hernandez progressive decline        COVID-19-POSITIVE[facility]expected---2.11.2021      CHFacute diastolic----2.11.2021            CTA chestpulmonary---2.11.2021no PEsmall-to-moderate volume                                      bilateral pleural effusionsbibasilar consolidationcalcific atherosclerosis                                      aorta and coronary arteries---mild cardiac enlargement  Anemia---noted progressive downward trend  ASCVD           EKG---2.11.2021----NSR74low voltage---RBBB---old septal infarctioncannot exclude old inferior infarction           Cf.  EKG----10.24.2020---NSR69LAERBBBold anteroseptal infarctioncannot exclude old inferior infarction           2D ECHO---10.10.2019---PFO right-to-left shuntminimal ASminimal MV stenosis---                               moderate TR---mild-to-moderate MRNRVSFLVEF ~ 60-65%           2D ECHO---6. 1.2016LVEF ~ 60%           Cardiac catheterization-----stents---BMS RCA4.2002diffuse non-obstructive CAD in                                LAD-LCx with mild high grade mid-RCA stenosis           Prior studies: 2D NXHD4.73480. 56890.7315---BXSIOXB stress5.2003---normal 4.2009         Hypertensive heart diseasepreviously without failure---but with BLE edema   RBBB  Valvular heart disease---murmur   Peripheral vascular disease         Carotid bruits   Hypertension  Hyperlipidemia  Diabetes Mellitus Type 2  CKD----Stage 3 GERD  COPD  [place]ARD  Memory loss  Depression--anxietyinsomnia   PMH:  anemia, bowel obstruction, chronic bilateral low back painL4-5 bulge,             opioid-induced constipation, dry eye syndrome, macular degeneration---OU,              endothelial corneal dystrophy, hyponatremia, OA, skin cancers, displaced fracture              right wrist radial styloid processclosed2017, rheumatic fever  PSH:  L4-5 spinal stenosis2012, appendectomy1948, cataract extractionIOL20051998OU,             blepharoplasty--levatorOU, YAG laser capsulotomy[id number], lacrimal gland            dilatation---irrigation---nasolacrimal duct obstruction---2008    Allergies:        codeine, Diltiazem---hives, Lyricapregabalin---hives, Levofloxacin  Intolerances: Atorvastatin---atorvastatin---muscle aches    Attending Supervising Physician's Attestation Statement  I performed a history and physical examination on the patient and discussed the management with the nurse practitioner. I reviewed and agree with the findings and plan as documented in her note . Electronically signed by Stacey Iniguez on 2/12/21 at 11:23 AM EST        PLAN:    1. Dyspnea on exertion -- clinically appears like CHF -- 2D echo pending, cardiology consulted, serial troponins, telemetry monitoring, IV diuresis, monitor I&O and daily weights  2. Anemia -- lovenox and aspirin held, recheck Hgb/Hct at noon, transfuse prn, iron studies pending, repeat stool for occult pending  3. Hypertension -- stable, con't home medications and monitor  4. IFG vs DMII -- HgbA1C pending, carb controlled diet when taking PO and monitor  5. RT protocols -- supplemental oxygen prn, mednebs prn  6. PT/OT eval and treat, fall precautions  7. Home medications reviewed  8. Need to discuss code status wishes with patient and her daughter when available  5.  See orders Note that over 50 minutes was spent in evaluation of the patient, review of the chart and pertinent records, discussion with family/staff, etc    Racheludence SANTO PhamC, FNP-BC  8:42 AM  2/12/2021

## 2021-02-12 NOTE — PROGRESS NOTES
Physician Progress Note      Ava Bianchi  [facility] #:                  [id number]  :                       1928  ADMIT DATE:       2021 4:24 PM  [address] [place] DATE:  RESPONDING  PROVIDER #:        Lucy GILLIS          QUERY TEXT:    Patient admitted with new onset CHF. Patient with recent Covid infection and   noted positive Covid test on 2021. If possible, please document in   progress notes and discharge summary if patient has an active Covid-19   infection or if patient is testing positive for Covid-19 without a current   active infection: The medical record reflects the following:  Risk Factors: was COVID positive on 2020  Clinical Indicators: noted SOB and cough, CT cheat Small to moderate volume   bilateral pleural effusion with bibasilar consolidation, greater on the left   than right. ?Pneumonia is a consideration versus sequela from congestive heart   failure. Treatment: Lasix 20mg IV BID, no isolation    Thank you,  Marquise Haprer, RN CDI Supervisor  Options provided:  -- Active Covid-19 infection is being treated and/or evaluated on current   encounter  -- Positive Covid test result without an active current Covid-19 infection  -- Other - I will add my own diagnosis  -- Disagree - Not applicable / Not valid  -- Disagree - Clinically unable to determine / Unknown  -- Refer to Clinical Documentation Reviewer    PROVIDER RESPONSE TEXT:    Patient is testing positive for Covid without an active Covid-19 infection.     Query created by: Oral Dowling on 2021 11:01 AM      Electronically signed by:  Rickie Miles 2021 11:03 AM

## 2021-02-12 NOTE — CONSULTS
[facility]    Consult                        Today's Date: 2/12/2021  Patient Name: Evelio Hunter  Date of admission: 2/11/2021  4:24 PM  Patient's age: 80 y.o., 8/16/1928  Admission Dx: New onset of congestive heart failure ([facility] [address] [I50.9]    Reason for Consult:  Acute CHF. Requesting Physician: Melinda Salinas MD    CHIEF COMPLAINT:    SOB    History Obtained From: electronic medical record    HISTORY OF PRESENT ILLNESS:      The patient is a 80 y.o.  female who is admitted to the hospital for SOB. The patient presented with SOB, orthopnea, cough and hypoxia. No chest pain. No dizziness or syncope. Past Medical History:   has a past medical history of Anxiety, 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, Cancer ([facility] [address] Depression, History of heart artery stent, Hyperlipidemia, Hypertension, and Nerve pain. Past Surgical History:   has a past surgical history that includes Lumbar disc surgery (2012); Appendectomy (1948); Cardiac catheterization (04/2002); and Cataract removal with implant (Bilateral). Home Medications:    Prior to Admission medications    Medication Sig Start Date End Date Taking?  Authorizing Provider   carvedilol (COREG) 12.5 MG tablet Take 1 tablet by mouth 2 times daily (with meals) 2/11/21  Yes Arslan Bolaños MD   amLODIPine (NORVASC) 2.5 MG tablet Take 1 tablet by mouth 2 times daily 2/11/21  Yes Arslan Bolaños MD   lisinopril (PRINIVIL;ZESTRIL) 2.5 MG tablet Take 1 tablet by mouth daily 2/11/21  Yes Arslan Bolaños MD   furosemide (LASIX) 20 MG tablet Take 1 tablet by mouth daily as needed (leg swelling) 2/11/21  Yes Arslan Bolaños MD   hydrALAZINE (APRESOLINE) 25 MG tablet Take 0.5 tablets by mouth 2 times daily 1/8/21  Yes Arslan Bolaños MD   polyethylene glycol (GLYCOLAX) 17 g packet Take 17 g by mouth daily   Yes Historical Provider, MD senna (SENOKOT) 8.6 MG tablet Take 1 tablet by mouth 2 times daily as needed for Constipation 10/8/20 10/8/21 Yes Brenna Miller MD   gabapentin (NEURONTIN) 300 MG capsule Take 2 capsules by mouth 2 times daily for 90 days. 9/27/20 2/11/21 Yes Brenna Miller MD   aspirin 81 MG chewable tablet Take 81 mg by mouth   Yes Karina Fernandez MD   sodium chloride 1 g tablet Take 1 tablet by mouth 2 times daily (with meals) 2/11/21   Brenna Miller MD   Glycerin, Laxative, (GLYCERIN ADULT) 2.1 g suppository Place 1 suppository rectally as needed (constipation) 1/14/21   Brenna Miller MD   blood glucose test strips (ASCENSIA AUTODISC VI;ONE TOUCH ULTRA TEST VI) strip 1 each by In Vitro route daily As needed. 6/23/20   Brenna Miller MD   Lancets MISC Test sugars once daily. R73.03 6/23/20   Brenna Miller MD       Allergies:  Atorvastatin, Codeine, Diltiazem hcl, Levofloxacin, Pregabalin, and Simvastatin    Social History:   reports that she has never smoked. She has never used smokeless tobacco. She reports that she does not drink alcohol or use drugs. Family History: family history includes Cancer in her natural sibling; Diabetes in her natural sibling; Heart Disease in her natural sibling; Hypertension in her father and mother; Stroke in her father. No h/o sudden cardiac death. No for premature CAD    REVIEW OF SYSTEMS:    · Constitutional: there has been no unanticipated weight loss. There's been Yes change in energy level, Yes change in activity level. · Eyes: No visual changes or diplopia. No scleral icterus. · ENT: No Headaches, hearing loss or vertigo. No mouth sores or sore throat. · Cardiovascular: No chest pain, Yes dyspnea on exertion, No palpitations or No loss of consciousness. No cough, hemoptysis, No pleuritic pain, or phlebitis. · Respiratory: No cough or wheezing, no sputum production. No hematemesis. · Gastrointestinal: No abdominal pain, appetite loss, blood in stools. No change in bowel or bladder habits. · Genitourinary: No dysuria, trouble voiding, or hematuria. · Musculoskeletal:  No gait disturbance, No weakness or joint complaints. · Integumentary: No rash or pruritis. · Neurological: No headache, diplopia, change in muscle strength, numbness or tingling. No change in gait, balance, coordination, mood, affect, memory, mentation, behavior. · Psychiatric: No anxiety, or depression. · Endocrine: No temperature intolerance. No excessive thirst, fluid intake, or urination. No tremor. · Hematologic/Lymphatic: No abnormal bruising or bleeding, blood clots or swollen lymph nodes. · Allergic/Immunologic: No nasal congestion or hives. PHYSICAL EXAM:      BP (!) 147/45   Pulse 66   Temp 96.9 °F (36.1 °C) (Tympanic)   Resp 14   Ht 5' (1.524 m)   Wt 111 lb 6.4 oz (50.5 kg)   SpO2 96%   BMI 21.76 kg/m²    Constitutional and General Appearance: alert, cooperative, no distress and appears stated age  HEENT: PERRL, no cervical lymphadenopathy. No masses palpable. Normal oral mucosa  Respiratory:  · Normal excursion and expansion without use of accessory muscles  · Resp Auscultation: Good respiratory effort. No for increased work of breathing. On auscultation: clear to auscultation bilaterally  Cardiovascular:  · The apical impulse is not displaced  · Heart tones are crisp and normal. regular S1 and S2.  · Jugular venous pulsation Normal  · The carotid upstroke is normal in amplitude and contour without delay or bruit  · Peripheral pulses are symmetrical and full  Abdomen:  · No masses or tenderness  · Bowel sounds present  Extremities:  ·  No Cyanosis or Clubbing  ·  Lower extremity edema: yes  · Skin: Warm and dry  Neurological:  · Alert and oriented.   · Moves all extremities well  · No abnormalities of mood, affect, memory, mentation, or behavior are noted    DATA:    Diagnostics:    EKG: NSR, RBBB Echo 10/2019 EF 60-65%. Mild AS. Mild to moderate MR. Moderate TR. Labs:     CBC:   Recent Labs     02/11/21  1651 02/12/21  0547   WBC 7.5 5.0   HGB 8.1* 7.0*   HCT 27.6* 24.3*    254     BMP:   Recent Labs     02/11/21  1651 02/12/21  0547    139   K 4.5 4.2   CO2 22 25   BUN 18 17   CREATININE 0.87 0.90   LABGLOM >60 59*   GLUCOSE 160* 91     BNP: No results for input(s): BNP in the last 72 hours. PT/INR:   Recent Labs     02/11/21 1651   PROTIME 14.9*   INR 1.2     APTT:  Recent Labs     02/11/21 1651   APTT 30.1     CARDIAC ENZYMES:No results for input(s): CKTOTAL, CKMB, CKMBINDEX, TROPONINI in the last 72 hours. FASTING LIPID PANEL:  Lab Results   Component Value Date    HDL 63 10/08/2020    TRIG 156 10/08/2020     LIVER PROFILE:  Recent Labs     02/11/21 1651   AST 28   ALT 17   LABALBU 3.3*       IMPRESSION/RECOMMENDATIONS:  1. Acute CHF with preserved EF. Continue IV lasix. Close follow on BMP, I/O and chart. 3. Anemia. Treat per primary service. 3. Elevated troponin. Type 2. Medical treatment, considering her age and co morbidities. Will hold on further cardiac work up. Will follow as outpatient. Discussed with patient and Nurse.     Whitley Lynch MD  [facility]           [phone]

## 2021-02-12 NOTE — ED NOTES
Patient was assisted up to [facility]. She moves well requiring stand-by assist.  Patient returned to bed, head of bed adjusted to her liking.   Patient becomes dyspneic with the activity but does recover with rest.        Virginia Delong RN  02/11/21 2037

## 2021-02-12 NOTE — FLOWSHEET NOTE
Patient's daughter Belgica Hawthorne contacted and updated with POC. Unable to re-contact daughter with re-update of POC and to confirm code status. Will re-attempt in 1 hour.       Nahum Lott RN

## 2021-02-12 NOTE — PROGRESS NOTES
[facility]lomo Andrews, [facility]. New onset of congestive heart failure ([facility] [address] [I50.9] . Vitals:    02/12/21 1210   BP: (!) 119/52   Pulse: 68   Resp: 22   Temp: 96.5 °F (35.8 °C)   SpO2: 96%   . Patients home meds are   Prior to Admission medications    Medication Sig Start Date End Date Taking? Authorizing Provider   carvedilol (COREG) 12.5 MG tablet Take 1 tablet by mouth 2 times daily (with meals) 2/11/21  Yes Arleen Dean MD   amLODIPine (NORVASC) 2.5 MG tablet Take 1 tablet by mouth 2 times daily 2/11/21  Yes Arleen Dean MD   lisinopril (PRINIVIL;ZESTRIL) 2.5 MG tablet Take 1 tablet by mouth daily 2/11/21  Yes Arleen Dean MD   furosemide (LASIX) 20 MG tablet Take 1 tablet by mouth daily as needed (leg swelling) 2/11/21  Yes Arleen Dean MD   hydrALAZINE (APRESOLINE) 25 MG tablet Take 0.5 tablets by mouth 2 times daily 1/8/21  Yes Arleen Dean MD   polyethylene glycol (GLYCOLAX) 17 g packet Take 17 g by mouth daily   Yes Historical Provider, MD   senna (SENOKOT) 8.6 MG tablet Take 1 tablet by mouth 2 times daily as needed for Constipation 10/8/20 10/8/21 Yes Arleen Dean MD   gabapentin (NEURONTIN) 300 MG capsule Take 2 capsules by mouth 2 times daily for 90 days. 9/27/20 2/11/21 Yes Arleen Dean MD   aspirin 81 MG chewable tablet Take 81 mg by mouth   Yes Historical Provider, MD   sodium chloride 1 g tablet Take 1 tablet by mouth 2 times daily (with meals) 2/11/21   Arleen Dean MD   Glycerin, Laxative, (GLYCERIN ADULT) 2.1 g suppository Place 1 suppository rectally as needed (constipation) 1/14/21   Arleen Dean MD   blood glucose test strips (ASCENSIA AUTODISC VI;ONE TOUCH ULTRA TEST VI) strip 1 each by In Vitro route daily As needed. 6/23/20   Arleen Dean MD   Lancets MISC Test sugars once daily.   R73.03 6/23/20   Arleen Dean MD   .  Recent Surgical History: None = 0     Assessment     Peak Flow (asthma only)    Predicted: 282  Personal Best: 150   % Predicted 53%  Peak Flow : 50-59% = 3    IS volume 750    RR 16  Breath Sounds: clear      · Bronchodilator assessment at level  1  · Hyperinflation assessment at level 1  · Secretion Management assessment at level  1  ·   · [x]    Bronchodilator Assessment  BRONCHODILATOR ASSESSMENT SCORE  Score 0 1 2 3 4 5   Breath Sounds   []  Patient Baseline [x]  No Wheeze good aeration []  Faint, scattered wheezing, good aeration []  Expiratory Wheezing and or moderately diminished []  Insp/Exp wheeze and/or very diminished []  Insp/Exp and/ or marked distress   Respiratory Rate   []  Patient Baseline [x]  Less than 20 []  Less than 20 []  20-25 []  Greater than 25 []  Greater than 25   Peak flow % of Pred or PB []  NA   []  Greater than 90%  []  81-90% []  71-80% [x]  Less than or equal to 70%  or unable to perform []  Unable due to Respiratory Distress   Dyspnea re []  Patient Baseline [x]  No SOB []  No SOB []  SOB on exertion []  SOB min activity []  At rest/acute   e FEV% Predicted       [x]  NA []  Above 69%  []  Unable []  Above 60-69%  []  Unable []  Above 50-59%  []  Unable []  Above 35-49%  []  Unable []  Less than 35%  []  Unable                 [x]  Hyperinflation Assessment  Score 1 2 3   CXR and Breath Sounds   [x]  Clear []  No atelectasis  Basilar aeration []  Atelectasis or absent basilar breath sounds   Incentive Spirometry Volume  (Per IBW)   [x]  Greater than or equal to 15ml/Kg []  less than 15ml/Kg []  less than 15ml/Kg   Surgery within last 2 weeks [x]  None or general   []  Abdominal or thoracic surgery  []  Abdominal or thoracic   Chronic Pulmonary Historyre [x]  No []  Yes []  Yes     [x]  Secretion Management Assessment  Score 1 2 3   Bilateral Breath Sounds   [x]  Occasional Rhonchi []  Scattered Rhonchi []  Course Rhonchi and/or poor aeration   Sputum    [x]  Small amount of thin secretions []  Moderate amount of viscous secretions []  Copius, Viscious Yellow/ Secretions CXR as reported by physician []  clear  []  Unavailable []  Infiltrates and/or consolidation  []  Unavailable []  Mucus Plugging and or lobar consolidation  []  Unavailable   Cough [x]  Strong, productive cough []  Weak productive cough []  No cough or weak non-productive cough   [place] Brad  3:10 PM                            FEMALE                                  MALE                            FEV1 Predicted Normal Values                        FEV1 Predicted Normal Values          Age                                     Height in Feet and Inches       Age                                     Height in Feet and Inches       4' 11\" 5' 1\" 5' 3\" 5' 5\" 5' 7\" 5' 9\" 5' 11\" 6' 1\"  4' 11\" 5' 1\" 5' 3\" 5' 5\" 5' 7\" 5' 9\" 5' 11\" 6' 1\"   42 - 45 2.49 2.66 2.84 3.03 3.22 3.42 3.62 3.83 42 - 45 2.82 3.03 3.26 3.49 3.72 3.96 4.22 4.47   46 - 49 2.40 2.57 2.76 2.94 3.14 3.33 3.54 3.75 46 - 49 2.70 2.92 3.14 3.37 3.61 3.85 4.10 4.36   50 - 53 2.31 2.48 2.66 2.85 3.04 3.24 3.45 3.66 50 - 53 2.58 2.80 3.02 3.25 3.49 3.73 3.98 4.24   54 - 57 2.21 2.38 2.57 2.75 2.95 3.14 3.35 3.56 54 - 57 2.46 2.67 2.89 3.12 3.36 3.60 3.85 4.11   58 - 61 2.10 2.28 2.46 2.65 2.84 3.04 3.24 3.45 58 - 61 2.32 2.54 2.76 2.99 3.23 3.47 3.72 3.98   62 - 65 1.99 2.17 2.35 2.54 2.73 2.93 3.13 3.34 62 - 65 2.19 2.40 2.62 2.85 3.09 3.33 3.58 3.84   66 - 69 1.88 2.05 2.23 2.42 2.61 2.81 3.02 3.23 66 - 69 2.04 2.26 2.48 2.71 2.95 3.19 3.44 3.70   70+ 1.82 1.99 2.17 2.36 2.55 2.75 2.95 3.16 70+ 1.97 2.19 2.41 2.64 2.87 3.12 3.37 3.62             Predicted Peak Expiratory Flow Rate                                       Height (in)  Female       Height (in) Male           Age 64 62 64 63 57 71 76 79 Age            21 344 255 636 743 260 956 889 153  04 87 34 89 47 78 06 46 93   25 337 352 366 381 396 411 426 441 25 447 476 505 533 562 [phone]   30 329 344 359 374 389 404 419 434 30 437 466 494 523 552 942 998 739 090 724 [id number] 314 329 344 359 374 70 353 382 410 439 468 [phone]   75 261 274 289 305 319 334 348 364 75 344 372 400 429 458 487 [phone]   80 253 266 282 296 312 327 342 356 80 335 362 390 419 448 [phone]

## 2021-02-12 NOTE — PROGRESS NOTES
Occupational Therapy   Occupational Therapy Initial Assessment  Date: 2021   Patient Name: Reena Claros  MRN: [id number]     : 1928    Date of Service: 2021    Discharge Recommendations:  Home with assist PRN     Assessment   Prognosis: Fair  Decision Making: Low Complexity  OT Education: OT Role;Plan of Care;Transfer Training  Patient Education: Pt educated on use of grab bar for sit > stand transfer following toileting task for improved safety. No Skilled OT: At baseline function  REQUIRES OT FOLLOW UP: No  Safety Devices  Safety Devices in place: Yes  Type of devices: Left in bed;Call light within reach           Patient Diagnosis(es): The primary encounter diagnosis was New onset of congestive heart failure ([facility] [address] A diagnosis of Anemia, unspecified type was also pertinent to this visit. 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 has a past surgical history that includes Lumbar disc surgery (); Appendectomy (194); Cardiac catheterization (2002); and Cataract removal with implant (Bilateral). Subjective   General  Chart Reviewed: Yes  Patient assessed for rehabilitation services?: Yes  Family / Caregiver Present: No  Referring Practitioner: KYLEE Rodriguez CNP  Diagnosis: New onset of congestive heart failure  Subjective  Subjective: Pt rec'd in bed, pleasant and cooperative for OT. Pt is a 79 yo female with a new onset of congestive heart failure.   Patient Currently in Pain: No  Vital Signs  Patient Currently in Pain: No    Social/Functional History  Social/Functional History  Lives With: Daughter  Type of Home: House  Home Layout: One level  Home Access: Stairs to enter with rails  Entrance Stairs - Number of Steps: 2  Entrance Stairs - Rails: Left  Bathroom Shower/Tub: Tub/Shower unit Bathroom Toilet: Handicap height  Bathroom Equipment: Grab bars in shower, Shower chair  Bathroom Accessibility: Accessible  Home Equipment: 4 wheeled walker  Receives Help From: Family  ADL Assistance: Needs assistance  Bath: Moderate assistance  Dressing: Minimal assistance  Toileting: Needs assistance  Homemaking Assistance: (Daughter cooks/cleans)  Homemaking Responsibilities: No  Ambulation Assistance: Independent  Transfer Assistance: Independent  Active : No  Patient's  Info: daughter     Objective   Vision: Within Functional Limits  Hearing: Exceptions to [facility]  Hearing Exceptions: Bilateral hearing aid    Orientation  Overall Orientation Status: Within Functional Limits  Observation/Palpation  Posture: Fair  Observation: Pt on 1L O2  ADL  Toileting: Modified independent   Additional Comments: Pt reports that her daughter assists her with LB dressing of socks and shoes, but she is able to dress herself otherwise. Pt's daughter also bathes her and assists with personal hygiene following toileting. Bed mobility  Rolling to Left: Modified independent  Supine to Sit: Modified independent  Sit to Supine: Minimal assistance(pt reports that she also receives assistance at home with getting into bed)  Scooting: Modified independent  Transfers  Stand Step Transfers: Modified independent  Sit to stand: Modified independent  Stand to sit: Modified independent  Transfer Comments: Pt used 2ww for functional mobility to/from toilet this date  Cognition  Overall Cognitive Status: WFL  LUE AROM (degrees)  LUE AROM : Exceptions  LUE General AROM: Very limited shoulder ROM. Per report from pt it has been this way for a long time. Left Hand AROM (degrees)  Left Hand AROM: WF  RUE AROM (degrees)  RUE AROM : Exceptions  RUE General AROM: Very limited shoulder ROM. Per report from pt it has been this way for a long time.   Right Hand AROM (degrees)  Right Hand AROM: WFL  LUE Strength Gross LUE Strength: Exceptions to [facility]  L Elbow Ext: 3+/5  L Hand General: 3+/5  RUE Strength  Gross RUE Strength: Exceptions to [facility]  R Elbow Flex: 3+/5  R Elbow Ext: 3+/5    Plan   Plan  Times per week: Eval only    Goals  Short term goals  Time Frame for Short term goals: Eval only       Therapy Time   Individual Concurrent Group Co-treatment   Time In 1500         Time Out 1510         Minutes 10         Timed Code Treatment Minutes: 0 Minutes       Adiel Murrieta, OT

## 2021-02-12 NOTE — PROGRESS NOTES
Physical Therapy    Facility/Department: [facility]  PROGRESSIVE CARE  Initial Assessment    NAME: Christa Olsen  : 1928  MRN: [id number]    Date of Service: 2021    Discharge Recommendations:  Home with assist PRN        Assessment   Prognosis: Good  Decision Making: Low Complexity  No Skilled PT: Independent with functional mobility   REQUIRES PT FOLLOW UP: No  Activity Tolerance  Activity Tolerance: Patient Tolerated treatment well       Patient Diagnosis(es): The primary encounter diagnosis was New onset of congestive heart failure ([facility] [address] A diagnosis of Anemia, unspecified type was also pertinent to this visit. 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 has a past surgical history that includes Lumbar disc surgery (); Appendectomy (); Cardiac catheterization (2002); and Cataract removal with implant (Bilateral).     Restrictions     Vision/Hearing  Vision: Within Functional Limits  Hearing: Within functional limits     Subjective  General  Chart Reviewed: Yes  Patient assessed for rehabilitation services?: Yes  Pain Screening  Patient Currently in Pain: No  Vital Signs  Patient Currently in Pain: No       Orientation  Orientation  Overall Orientation Status: Within Functional Limits  Social/Functional History  Social/Functional History  Lives With: Daughter  Type of Home: House  Home Layout: One level  Home Access: Stairs to enter with rails  Entrance Stairs - Number of Steps: 2  Entrance Stairs - Rails: Left  Bathroom Shower/Tub: Tub/Shower unit  Bathroom Toilet: Handicap height  Bathroom Equipment: Grab bars in shower, Shower chair  Bathroom Accessibility: Accessible  Home Equipment: 4 wheeled walker  Receives Help From: Family  ADL Assistance: Independent  Homemaking Assistance: (Daughter cooks/cleans) Ambulation Assistance: Independent  Transfer Assistance: Independent  Active : No  Cognition        Objective     Observation/Palpation  Posture: Fair    AROM RLE (degrees)  RLE AROM: WFL  AROM LLE (degrees)  LLE AROM : WFL  Strength RLE  Strength RLE: WFL  Strength LLE  Strength LLE: WFL           Transfers  Sit to Stand: Independent  Stand to sit:  Independent  Ambulation  Ambulation?: Yes  Ambulation 1  Surface: level tile  Device: Rolling Walker  Assistance: Independent  Distance: 30ft x 2     Balance  Posture: Fair  Sitting - Static: Good  Sitting - Dynamic: Good  Standing - Static: Good  Standing - Dynamic: Fair;+        Plan   Safety Devices  Type of devices: Call light within reach, Nurse notified, Left in chair    G-Code       OutComes Score                                                  AM-PAC Score             Goals          Therapy Time   Individual Concurrent Group Co-treatment   Time In 1130         Time Out Ramila 12, PT

## 2021-02-12 NOTE — PLAN OF CARE
Lives with daughter, business card provided, denies needs at discharge. Voices understanding to notify DC planning if needs arise before discharge.

## 2021-02-13 ENCOUNTER — APPOINTMENT (OUTPATIENT)
Dept: GENERAL RADIOLOGY | Age: 86
DRG: 291 | End: 2021-02-13
Payer: MEDICARE

## 2021-02-13 LAB
ABO/RH: NORMAL
ABSOLUTE EOS #: 0.12 K/UL (ref 0–0.44)
ABSOLUTE IMMATURE GRANULOCYTE: <0.03 K/UL (ref 0–0.3)
ABSOLUTE LYMPH #: 1.61 K/UL (ref 1.1–3.7)
ABSOLUTE MONO #: 0.73 K/UL (ref 0.1–1.2)
ANION GAP SERPL CALCULATED.3IONS-SCNC: 7 MMOL/L (ref 9–17)
ANTIBODY SCREEN: NEGATIVE
ARM BAND NUMBER: NORMAL
BASOPHILS # BLD: 1 % (ref 0–2)
BASOPHILS ABSOLUTE: 0.03 K/UL (ref 0–0.2)
BLD PROD TYP BPU: NORMAL
BUN BLDV-MCNC: 14 MG/DL (ref 8–23)
BUN/CREAT BLD: 15 (ref 9–20)
CALCIUM SERPL-MCNC: 8.2 MG/DL (ref 8.6–10.4)
CHLORIDE BLD-SCNC: 102 MMOL/L (ref 98–107)
CO2: 27 MMOL/L (ref 20–31)
CREAT SERPL-MCNC: 0.94 MG/DL (ref 0.5–0.9)
CROSSMATCH RESULT: NORMAL
DIFFERENTIAL TYPE: ABNORMAL
DISPENSE STATUS BLOOD BANK: NORMAL
EOSINOPHILS RELATIVE PERCENT: 2 % (ref 1–4)
EXPIRATION DATE: NORMAL
GFR AFRICAN AMERICAN: >60 ML/MIN
GFR NON-AFRICAN AMERICAN: 56 ML/MIN
GFR SERPL CREATININE-BSD FRML MDRD: ABNORMAL ML/MIN/{1.73_M2}
GFR SERPL CREATININE-BSD FRML MDRD: ABNORMAL ML/MIN/{1.73_M2}
GLUCOSE BLD-MCNC: 93 MG/DL (ref 70–99)
HCT VFR BLD CALC: 26.4 % (ref 36.3–47.1)
HCT VFR BLD CALC: 26.4 % (ref 36.3–47.1)
HCT VFR BLD CALC: 26.6 % (ref 36.3–47.1)
HCT VFR BLD CALC: 27.6 % (ref 36.3–47.1)
HEMOGLOBIN: 7.8 G/DL (ref 11.9–15.1)
HEMOGLOBIN: 7.9 G/DL (ref 11.9–15.1)
HEMOGLOBIN: 7.9 G/DL (ref 11.9–15.1)
HEMOGLOBIN: 8.4 G/DL (ref 11.9–15.1)
IMMATURE GRANULOCYTES: 0 %
LYMPHOCYTES # BLD: 26 % (ref 24–43)
MCH RBC QN AUTO: 25.9 PG (ref 25.2–33.5)
MCHC RBC AUTO-ENTMCNC: 30.4 G/DL (ref 25.2–33.5)
MCV RBC AUTO: 85.2 FL (ref 82.6–102.9)
MONOCYTES # BLD: 12 % (ref 3–12)
NRBC AUTOMATED: 0 PER 100 WBC
PDW BLD-RTO: 16 % (ref 11.8–14.4)
PLATELET # BLD: 255 K/UL (ref 138–453)
PLATELET ESTIMATE: ABNORMAL
PMV BLD AUTO: 10.7 FL (ref 8.1–13.5)
POTASSIUM SERPL-SCNC: 4.2 MMOL/L (ref 3.7–5.3)
RBC # BLD: 3.24 M/UL (ref 3.95–5.11)
RBC # BLD: ABNORMAL 10*6/UL
SEG NEUTROPHILS: 59 % (ref 36–65)
SEGMENTED NEUTROPHILS ABSOLUTE COUNT: 3.77 K/UL (ref 1.5–8.1)
SODIUM BLD-SCNC: 136 MMOL/L (ref 135–144)
TRANSFUSION STATUS: NORMAL
UNIT DIVISION: 0
UNIT NUMBER: NORMAL
WBC # BLD: 6.3 K/UL (ref 3.5–11.3)
WBC # BLD: ABNORMAL 10*3/UL

## 2021-02-13 PROCEDURE — 71045 X-RAY EXAM CHEST 1 VIEW: CPT

## 2021-02-13 PROCEDURE — 99232 SBSQ HOSP IP/OBS MODERATE 35: CPT | Performed by: FAMILY MEDICINE

## 2021-02-13 PROCEDURE — 6360000002 HC RX W HCPCS: Performed by: NURSE PRACTITIONER

## 2021-02-13 PROCEDURE — 6370000000 HC RX 637 (ALT 250 FOR IP): Performed by: NURSE PRACTITIONER

## 2021-02-13 PROCEDURE — 36430 TRANSFUSION BLD/BLD COMPNT: CPT

## 2021-02-13 PROCEDURE — 80048 BASIC METABOLIC PNL TOTAL CA: CPT

## 2021-02-13 PROCEDURE — 94640 AIRWAY INHALATION TREATMENT: CPT

## 2021-02-13 PROCEDURE — 6360000002 HC RX W HCPCS: Performed by: INTERNAL MEDICINE

## 2021-02-13 PROCEDURE — 85014 HEMATOCRIT: CPT

## 2021-02-13 PROCEDURE — 2580000003 HC RX 258: Performed by: NURSE PRACTITIONER

## 2021-02-13 PROCEDURE — 2060000000 HC ICU INTERMEDIATE R&B

## 2021-02-13 PROCEDURE — 85025 COMPLETE CBC W/AUTO DIFF WBC: CPT

## 2021-02-13 PROCEDURE — 94761 N-INVAS EAR/PLS OXIMETRY MLT: CPT

## 2021-02-13 PROCEDURE — 2700000000 HC OXYGEN THERAPY PER DAY

## 2021-02-13 PROCEDURE — 36415 COLL VENOUS BLD VENIPUNCTURE: CPT

## 2021-02-13 PROCEDURE — 85018 HEMOGLOBIN: CPT

## 2021-02-13 RX ADMIN — GABAPENTIN 600 MG: 300 CAPSULE ORAL at 08:37

## 2021-02-13 RX ADMIN — OXYCODONE AND ACETAMINOPHEN 2 TABLET: 5; 325 TABLET ORAL at 15:28

## 2021-02-13 RX ADMIN — ALBUTEROL SULFATE 2.5 MG: 2.5 SOLUTION RESPIRATORY (INHALATION) at 21:55

## 2021-02-13 RX ADMIN — SODIUM CHLORIDE, PRESERVATIVE FREE 10 ML: 5 INJECTION INTRAVENOUS at 08:34

## 2021-02-13 RX ADMIN — LISINOPRIL 2.5 MG: 2.5 TABLET ORAL at 08:29

## 2021-02-13 RX ADMIN — OXYCODONE AND ACETAMINOPHEN 2 TABLET: 5; 325 TABLET ORAL at 21:29

## 2021-02-13 RX ADMIN — SODIUM CHLORIDE TAB 1 GM 1 G: 1 TAB at 08:28

## 2021-02-13 RX ADMIN — FUROSEMIDE 20 MG: 10 INJECTION, SOLUTION INTRAMUSCULAR; INTRAVENOUS at 17:23

## 2021-02-13 RX ADMIN — SODIUM CHLORIDE, PRESERVATIVE FREE 10 ML: 5 INJECTION INTRAVENOUS at 21:19

## 2021-02-13 RX ADMIN — CARVEDILOL 12.5 MG: 12.5 TABLET, FILM COATED ORAL at 17:23

## 2021-02-13 RX ADMIN — AMLODIPINE BESYLATE 5 MG: 5 TABLET ORAL at 08:29

## 2021-02-13 RX ADMIN — CARVEDILOL 12.5 MG: 12.5 TABLET, FILM COATED ORAL at 08:29

## 2021-02-13 RX ADMIN — AMLODIPINE BESYLATE 5 MG: 5 TABLET ORAL at 21:16

## 2021-02-13 RX ADMIN — HYDRALAZINE HYDROCHLORIDE 12.5 MG: 25 TABLET, FILM COATED ORAL at 21:16

## 2021-02-13 RX ADMIN — HYDRALAZINE HYDROCHLORIDE 12.5 MG: 25 TABLET, FILM COATED ORAL at 08:28

## 2021-02-13 RX ADMIN — GABAPENTIN 600 MG: 300 CAPSULE ORAL at 21:15

## 2021-02-13 RX ADMIN — FUROSEMIDE 20 MG: 10 INJECTION, SOLUTION INTRAMUSCULAR; INTRAVENOUS at 08:29

## 2021-02-13 RX ADMIN — SODIUM CHLORIDE TAB 1 GM 1 G: 1 TAB at 17:23

## 2021-02-13 RX ADMIN — POLYETHYLENE GLYCOL 3350 17 G: 17 POWDER, FOR SOLUTION ORAL at 08:29

## 2021-02-13 RX ADMIN — ISOSORBIDE MONONITRATE 30 MG: 30 TABLET, EXTENDED RELEASE ORAL at 08:28

## 2021-02-13 ASSESSMENT — PAIN SCALES - GENERAL
PAINLEVEL_OUTOF10: 8
PAINLEVEL_OUTOF10: 8
PAINLEVEL_OUTOF10: 6

## 2021-02-13 ASSESSMENT — PAIN DESCRIPTION - ORIENTATION: ORIENTATION: LEFT

## 2021-02-13 ASSESSMENT — PAIN DESCRIPTION - ONSET: ONSET: ON-GOING

## 2021-02-13 ASSESSMENT — PAIN DESCRIPTION - DESCRIPTORS: DESCRIPTORS: ACHING

## 2021-02-13 NOTE — PLAN OF CARE
Problem: Falls - Risk of:  Goal: Will remain free from falls  Description: Will remain free from falls  Outcome: Ongoing  Goal: Absence of physical injury  Description: Absence of physical injury  Outcome: Ongoing     Problem: Airway Clearance - Ineffective  Goal: Achieve or maintain patent airway  Outcome: Ongoing     Problem: Gas Exchange - Impaired  Goal: Absence of hypoxia  Outcome: Ongoing  Goal: Promote optimal lung function  Outcome: Ongoing     Problem: Breathing Pattern - Ineffective  Goal: Ability to achieve and maintain a regular respiratory rate  Outcome: Ongoing     Problem:  Body Temperature -  Risk of, Imbalanced  Goal: Ability to maintain a body temperature within defined limits  Outcome: Ongoing  Goal: Will regain or maintain usual level of consciousness  Outcome: Ongoing  Goal: Complications related to the disease process, condition or treatment will be avoided or minimized  Outcome: Ongoing     Problem: Isolation Precautions - Risk of Spread of Infection  Goal: Prevent transmission of infection  Outcome: Ongoing     Problem: Nutrition Deficits  Goal: Optimize nutritional status  Outcome: Ongoing     Problem: Risk for Fluid Volume Deficit  Goal: Maintain normal heart rhythm  Outcome: Ongoing  Goal: Maintain absence of muscle cramping  Outcome: Ongoing  Goal: Maintain normal serum potassium, sodium, calcium, phosphorus, and pH  Outcome: Ongoing     Problem: Loneliness or Risk for Loneliness  Goal: Demonstrate positive use of time alone when socialization is not possible  Outcome: Ongoing     Problem: Fatigue  Goal: Verbalize increase energy and improved vitality  Outcome: Ongoing     Problem: Patient Education: Go to Patient Education Activity  Goal: Patient/Family Education  Outcome: Ongoing     Problem: Discharge Planning:  Goal: Discharged to appropriate level of care  Description: Discharged to appropriate level of care  2/12/2021 1900 by Charlie Mancilla RN  Outcome: Ongoing

## 2021-02-13 NOTE — PROGRESS NOTES
Hospitalist Progress Note  2/13/2021 11:21 AM  Subjective:   Admit Date: 2/11/2021  PCP: Anitra Cunha MD    Interval History: Patient with CHF,has improvement in leg swelling ,breathing is better. Anemia  recieved a unit of blood yesterday hgb is improved today. Diet: DIET CARDIAC;  Medications:   Scheduled Meds:   amLODIPine  5 mg Oral BID    isosorbide mononitrate  30 mg Oral Daily    rOPINIRole  0.25 mg Oral BID    [Held by provider] aspirin  81 mg Oral Daily    carvedilol  12.5 mg Oral BID WC    gabapentin  600 mg Oral BID    hydrALAZINE  12.5 mg Oral BID    lisinopril  2.5 mg Oral Daily    polyethylene glycol  17 g Oral Daily    sodium chloride  1 g Oral BID     sodium chloride flush  10 mL Intravenous 2 times per day    furosemide  20 mg Intravenous BID    [Held by provider] enoxaparin  40 mg Subcutaneous Daily     Continuous Infusions:   sodium chloride         Patient's current medications documented, reviewed, and updated. CBC:   Recent Labs     02/11/21  1651 02/12/21  0547 02/12/21  0547 02/12/21  1857 02/13/21  0200 02/13/21  0753   WBC 7.5 5.0  --   --   --  6.3   HGB 8.1* 7.0*   < > 6.8* 7.9* 8.4*    254  --   --   --  255    < > = values in this interval not displayed. BMP:    Recent Labs     02/11/21  1651 02/12/21  0547 02/13/21  0753    139 136   K 4.5 4.2 4.2    105 102   CO2 22 25 27   BUN 18 17 14   CREATININE 0.87 0.90 0.94*   GLUCOSE 160* 91 93     Hepatic:   Recent Labs     02/11/21  1651   AST 28   ALT 17   BILITOT 0.33   ALKPHOS 69     Troponin: No results for input(s): TROPONINI in the last 72 hours. BNP: No results for input(s): BNP in the last 72 hours.   Lipids:   Recent Labs     02/12/21  0547   CHOL 77   HDL 49     INR:   Recent Labs     02/11/21  1651   INR 1.2         Objective: Vitals: BP (!) 138/53   Pulse 65   Temp 97.9 °F (36.6 °C) (Oral)   Resp 21   Ht 5' (1.524 m)   Wt 111 lb 6.4 oz (50.5 kg)   SpO2 96%   BMI 21.76 kg/m²   General appearance: alert and cooperative with exam  HEENT: Eye: Normal external eye, conjunctiva, lids cornea, SARAY. Neck: no adenopathy, no carotid bruit, no JVD, supple, symmetrical, trachea midline and thyroid not enlarged, symmetric, no tenderness/mass/nodules  Lungs: diminished breath sounds bibasilar  Heart: regular rate and rhythm, S1, S2 normal, no murmur, click, rub or gallop  Abdomen: soft, non-tender; bowel sounds normal; no masses,  no organomegaly  Extremities: extremities normal, atraumatic, no cyanosis or edema  Neurologic: Mental status: Alert, oriented, thought content appropriate    Assessment and Plan:   1. Acute CHF with preserved EF  2. Anemia requiring transfusion    PLAN:Continue IV diuresis,monItor labs.         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     Early stage nonexudative age-related macular degeneration of both eyes     Endothelial corneal dystrophy     Gastroesophageal reflux disease     Hyperlipidemia, mixed     Prediabetes     Shortness of breath     Constipation     Hyponatremia     New onset of congestive heart failure (HCC)     History of malignant melanoma of skin     COPD (chronic obstructive pulmonary disease) (HCC)     PFO (patent foramen ovale)     COVID-19     Chronic left shoulder pain     Vasovagal syncope     NSTEMI (non-ST elevated myocardial infarction) ([facility] [address]     Dagoberto Hernández MD  Rounding Hospitalist

## 2021-02-14 VITALS
BODY MASS INDEX: 21.44 KG/M2 | HEART RATE: 67 BPM | TEMPERATURE: 97.8 F | DIASTOLIC BLOOD PRESSURE: 74 MMHG | OXYGEN SATURATION: 97 % | WEIGHT: 109.2 LBS | RESPIRATION RATE: 18 BRPM | SYSTOLIC BLOOD PRESSURE: 132 MMHG | HEIGHT: 60 IN

## 2021-02-14 LAB
ABSOLUTE EOS #: 0.17 K/UL (ref 0–0.44)
ABSOLUTE IMMATURE GRANULOCYTE: <0.03 K/UL (ref 0–0.3)
ABSOLUTE LYMPH #: 2.14 K/UL (ref 1.1–3.7)
ABSOLUTE MONO #: 0.75 K/UL (ref 0.1–1.2)
ANION GAP SERPL CALCULATED.3IONS-SCNC: 10 MMOL/L (ref 9–17)
BASOPHILS # BLD: 0 % (ref 0–2)
BASOPHILS ABSOLUTE: <0.03 K/UL (ref 0–0.2)
BUN BLDV-MCNC: 14 MG/DL (ref 8–23)
BUN/CREAT BLD: 14 (ref 9–20)
CALCIUM SERPL-MCNC: 8 MG/DL (ref 8.6–10.4)
CHLORIDE BLD-SCNC: 101 MMOL/L (ref 98–107)
CO2: 28 MMOL/L (ref 20–31)
CREAT SERPL-MCNC: 0.97 MG/DL (ref 0.5–0.9)
DIFFERENTIAL TYPE: ABNORMAL
EOSINOPHILS RELATIVE PERCENT: 3 % (ref 1–4)
GFR AFRICAN AMERICAN: >60 ML/MIN
GFR NON-AFRICAN AMERICAN: 54 ML/MIN
GFR SERPL CREATININE-BSD FRML MDRD: ABNORMAL ML/MIN/{1.73_M2}
GFR SERPL CREATININE-BSD FRML MDRD: ABNORMAL ML/MIN/{1.73_M2}
GLUCOSE BLD-MCNC: 85 MG/DL (ref 70–99)
HCT VFR BLD CALC: 26.4 % (ref 36.3–47.1)
HCT VFR BLD CALC: 26.5 % (ref 36.3–47.1)
HCT VFR BLD CALC: 28.3 % (ref 36.3–47.1)
HEMOGLOBIN: 7.9 G/DL (ref 11.9–15.1)
HEMOGLOBIN: 8 G/DL (ref 11.9–15.1)
HEMOGLOBIN: 8.4 G/DL (ref 11.9–15.1)
IMMATURE GRANULOCYTES: 0 %
LYMPHOCYTES # BLD: 33 % (ref 24–43)
MCH RBC QN AUTO: 25.7 PG (ref 25.2–33.5)
MCHC RBC AUTO-ENTMCNC: 30.3 G/DL (ref 25.2–33.5)
MCV RBC AUTO: 84.9 FL (ref 82.6–102.9)
MONOCYTES # BLD: 12 % (ref 3–12)
NRBC AUTOMATED: 0 PER 100 WBC
PDW BLD-RTO: 16.1 % (ref 11.8–14.4)
PLATELET # BLD: 244 K/UL (ref 138–453)
PLATELET ESTIMATE: ABNORMAL
PMV BLD AUTO: 11.1 FL (ref 8.1–13.5)
POTASSIUM SERPL-SCNC: 4.2 MMOL/L (ref 3.7–5.3)
RBC # BLD: 3.11 M/UL (ref 3.95–5.11)
RBC # BLD: ABNORMAL 10*6/UL
SEG NEUTROPHILS: 52 % (ref 36–65)
SEGMENTED NEUTROPHILS ABSOLUTE COUNT: 3.43 K/UL (ref 1.5–8.1)
SODIUM BLD-SCNC: 139 MMOL/L (ref 135–144)
WBC # BLD: 6.5 K/UL (ref 3.5–11.3)
WBC # BLD: ABNORMAL 10*3/UL

## 2021-02-14 PROCEDURE — 2700000000 HC OXYGEN THERAPY PER DAY

## 2021-02-14 PROCEDURE — 94761 N-INVAS EAR/PLS OXIMETRY MLT: CPT

## 2021-02-14 PROCEDURE — 6370000000 HC RX 637 (ALT 250 FOR IP): Performed by: INTERNAL MEDICINE

## 2021-02-14 PROCEDURE — 36415 COLL VENOUS BLD VENIPUNCTURE: CPT

## 2021-02-14 PROCEDURE — 85018 HEMOGLOBIN: CPT

## 2021-02-14 PROCEDURE — 6370000000 HC RX 637 (ALT 250 FOR IP): Performed by: NURSE PRACTITIONER

## 2021-02-14 PROCEDURE — 99239 HOSP IP/OBS DSCHRG MGMT >30: CPT | Performed by: FAMILY MEDICINE

## 2021-02-14 PROCEDURE — 85014 HEMATOCRIT: CPT

## 2021-02-14 PROCEDURE — 6360000002 HC RX W HCPCS: Performed by: NURSE PRACTITIONER

## 2021-02-14 PROCEDURE — 2580000003 HC RX 258: Performed by: NURSE PRACTITIONER

## 2021-02-14 PROCEDURE — 80048 BASIC METABOLIC PNL TOTAL CA: CPT

## 2021-02-14 PROCEDURE — 85025 COMPLETE CBC W/AUTO DIFF WBC: CPT

## 2021-02-14 RX ORDER — ROPINIROLE 0.25 MG/1
TABLET, FILM COATED ORAL
Qty: 90 TABLET | Refills: 2 | Status: SHIPPED | OUTPATIENT
Start: 2021-02-14 | End: 2021-11-03 | Stop reason: SDUPTHER

## 2021-02-14 RX ORDER — FERROUS SULFATE 325(65) MG
325 TABLET ORAL 2 TIMES DAILY
Qty: 60 TABLET | Refills: 1 | Status: SHIPPED | OUTPATIENT
Start: 2021-02-14 | End: 2021-04-14

## 2021-02-14 RX ORDER — AMLODIPINE BESYLATE 5 MG/1
5 TABLET ORAL 2 TIMES DAILY
Qty: 30 TABLET | Refills: 0 | Status: SHIPPED | OUTPATIENT
Start: 2021-02-14 | End: 2021-03-01

## 2021-02-14 RX ORDER — ISOSORBIDE MONONITRATE 30 MG/1
30 TABLET, EXTENDED RELEASE ORAL DAILY
Qty: 30 TABLET | Refills: 3 | Status: SHIPPED | OUTPATIENT
Start: 2021-02-15 | End: 2021-03-22 | Stop reason: SDUPTHER

## 2021-02-14 RX ADMIN — FUROSEMIDE 20 MG: 10 INJECTION, SOLUTION INTRAMUSCULAR; INTRAVENOUS at 09:22

## 2021-02-14 RX ADMIN — HYDRALAZINE HYDROCHLORIDE 12.5 MG: 25 TABLET, FILM COATED ORAL at 09:21

## 2021-02-14 RX ADMIN — CARVEDILOL 12.5 MG: 12.5 TABLET, FILM COATED ORAL at 07:55

## 2021-02-14 RX ADMIN — ROPINIROLE HYDROCHLORIDE 0.25 MG: 0.25 TABLET, FILM COATED ORAL at 09:20

## 2021-02-14 RX ADMIN — OXYCODONE AND ACETAMINOPHEN 2 TABLET: 5; 325 TABLET ORAL at 03:43

## 2021-02-14 RX ADMIN — SODIUM CHLORIDE TAB 1 GM 1 G: 1 TAB at 07:54

## 2021-02-14 RX ADMIN — LISINOPRIL 2.5 MG: 2.5 TABLET ORAL at 09:21

## 2021-02-14 RX ADMIN — ISOSORBIDE MONONITRATE 30 MG: 30 TABLET, EXTENDED RELEASE ORAL at 07:55

## 2021-02-14 RX ADMIN — POLYETHYLENE GLYCOL 3350 17 G: 17 POWDER, FOR SOLUTION ORAL at 07:55

## 2021-02-14 RX ADMIN — GABAPENTIN 600 MG: 300 CAPSULE ORAL at 09:21

## 2021-02-14 RX ADMIN — AMLODIPINE BESYLATE 5 MG: 5 TABLET ORAL at 09:20

## 2021-02-14 RX ADMIN — SODIUM CHLORIDE, PRESERVATIVE FREE 10 ML: 5 INJECTION INTRAVENOUS at 07:56

## 2021-02-14 ASSESSMENT — PAIN SCALES - GENERAL
PAINLEVEL_OUTOF10: 7
PAINLEVEL_OUTOF10: 0
PAINLEVEL_OUTOF10: 7
PAINLEVEL_OUTOF10: 0

## 2021-02-14 ASSESSMENT — PAIN DESCRIPTION - ORIENTATION: ORIENTATION: LEFT

## 2021-02-14 ASSESSMENT — PAIN DESCRIPTION - PAIN TYPE: TYPE: CHRONIC PAIN

## 2021-02-14 ASSESSMENT — PAIN DESCRIPTION - LOCATION: LOCATION: FOOT

## 2021-02-14 NOTE — PLAN OF CARE
Problem: Falls - Risk of:  Goal: Will remain free from falls  Description: Will remain free from falls  2/13/2021 2306 by Tanya Prader, RN  Outcome: Ongoing  2/13/2021 1859 by Briana Dye RN  Outcome: Ongoing  2/13/2021 1858 by Briana Dye RN  Outcome: Ongoing  Goal: Absence of physical injury  Description: Absence of physical injury  2/13/2021 2306 by Tanya Prader, RN  Outcome: Ongoing  2/13/2021 1859 by Briana Dye RN  Outcome: Ongoing  2/13/2021 1858 by Briana Dye RN  Outcome: Ongoing     Problem: Airway Clearance - Ineffective  Goal: Achieve or maintain patent airway  2/13/2021 2306 by Tanya Prader, RN  Outcome: Ongoing  2/13/2021 1859 by Briana Dye RN  Outcome: Ongoing  2/13/2021 1858 by Briana Dye RN  Outcome: Ongoing     Problem: Gas Exchange - Impaired  Goal: Absence of hypoxia  2/13/2021 2306 by Tanya Prader, RN  Outcome: Ongoing  2/13/2021 1859 by Briana Dye RN  Outcome: Ongoing  2/13/2021 1858 by Briana Dye RN  Outcome: Ongoing  Goal: Promote optimal lung function  2/13/2021 2306 by Tanya Prader, RN  Outcome: Ongoing  2/13/2021 1859 by Briana Dye RN  Outcome: Ongoing  2/13/2021 1858 by Briana Dye RN  Outcome: Ongoing     Problem: Breathing Pattern - Ineffective  Goal: Ability to achieve and maintain a regular respiratory rate  2/13/2021 2306 by Tanya Prader, RN  Outcome: Ongoing  2/13/2021 1859 by Briana Dye RN  Outcome: Ongoing  2/13/2021 1858 by Briana Dye RN  Outcome: Ongoing     Problem:  Body Temperature -  Risk of, Imbalanced  Goal: Ability to maintain a body temperature within defined limits  2/13/2021 2306 by Tanya Prader, RN  Outcome: Ongoing  2/13/2021 1859 by Briana Dye RN  Outcome: Ongoing  2/13/2021 1858 by Briana Dye RN  Outcome: Ongoing  Goal: Will regain or maintain usual level of consciousness  2/13/2021 2306 by Tanya Prader, RN  Outcome: Ongoing  2/13/2021 1859 by Briana Dye RN  Outcome: Ongoing 2/13/2021 1858 by Ronn Centeno RN  Outcome: Ongoing  Goal: Complications related to the disease process, condition or treatment will be avoided or minimized  2/13/2021 2306 by Vladimir Husain RN  Outcome: Ongoing  2/13/2021 1859 by Ronn Centeno RN  Outcome: Ongoing  2/13/2021 1858 by Ronn Centeno RN  Outcome: Ongoing     Problem: Isolation Precautions - Risk of Spread of Infection  Goal: Prevent transmission of infection  2/13/2021 2306 by Vladimir Husain RN  Outcome: Ongoing  2/13/2021 1859 by Ronn Centeno RN  Outcome: Ongoing  2/13/2021 1858 by Ronn Centeno RN  Outcome: Ongoing     Problem: Nutrition Deficits  Goal: Optimize nutritional status  2/13/2021 2306 by Vladimir Husain RN  Outcome: Ongoing  2/13/2021 1859 by Ronn Centeno RN  Outcome: Ongoing  2/13/2021 1858 by Ronn Centeno RN  Outcome: Ongoing     Problem: Risk for Fluid Volume Deficit  Goal: Maintain normal heart rhythm  2/13/2021 2306 by Vladimir Husain RN  Outcome: Ongoing  2/13/2021 1859 by Ronn Centeno RN  Outcome: Ongoing  2/13/2021 1858 by Ronn Centeno RN  Outcome: Ongoing  Goal: Maintain absence of muscle cramping  2/13/2021 2306 by Vladimir Husain RN  Outcome: Ongoing  2/13/2021 1859 by Ronn Centeno RN  Outcome: Ongoing  2/13/2021 1858 by Ronn Centeno RN  Outcome: Ongoing  Goal: Maintain normal serum potassium, sodium, calcium, phosphorus, and pH  2/13/2021 2306 by Vladimir Husain RN  Outcome: Ongoing  2/13/2021 1859 by Ronn Centeno RN  Outcome: Ongoing  2/13/2021 1858 by Ronn Centeno RN  Outcome: Ongoing     Problem: Loneliness or Risk for Loneliness  Goal: Demonstrate positive use of time alone when socialization is not possible  2/13/2021 2306 by Vladimir Husain RN  Outcome: Ongoing  2/13/2021 1859 by Ronn Centeno RN  Outcome: Ongoing  2/13/2021 1858 by Ronn Centeno RN  Outcome: Ongoing     Problem: Fatigue  Goal: Verbalize increase energy and improved vitality  2/13/2021 2306 by Vladimir Husani, RN  Outcome: Ongoing

## 2021-02-14 NOTE — PROGRESS NOTES
Home Oxygen Evaluation completed.       Resting SpO2 on room air = 90%    SpO2 on room air with exercise = 85%  SpO2 on oxygen 2lpm on nasal cannula with 6 min exercise = 94%      [employer]  10:22 AM

## 2021-02-14 NOTE — PLAN OF CARE
Problem: Falls - Risk of:  Goal: Will remain free from falls  Description: Will remain free from falls  2/14/2021 1046 by Albania Walden RN  Outcome: Ongoing  2/13/2021 2306 by Re Rodriguez RN  Outcome: Ongoing  Goal: Absence of physical injury  Description: Absence of physical injury  2/14/2021 1046 by Albania Walden RN  Outcome: Ongoing  2/13/2021 2306 by Re Rodriguez RN  Outcome: Ongoing     Problem: Airway Clearance - Ineffective  Goal: Achieve or maintain patent airway  2/14/2021 1046 by Albania Walden RN  Outcome: Ongoing  2/13/2021 2306 by Re Rodriguez RN  Outcome: Ongoing     Problem: Gas Exchange - Impaired  Goal: Absence of hypoxia  2/14/2021 1046 by Albania Walden RN  Outcome: Ongoing  2/13/2021 2306 by Re Rodriguez RN  Outcome: Ongoing  Goal: Promote optimal lung function  2/14/2021 1046 by Albania Walden RN  Outcome: Ongoing  2/13/2021 2306 by Re Rodriguez RN  Outcome: Ongoing     Problem: Breathing Pattern - Ineffective  Goal: Ability to achieve and maintain a regular respiratory rate  2/14/2021 1046 by Albania Walden RN  Outcome: Ongoing  2/13/2021 2306 by Re Rodriguez RN  Outcome: Ongoing     Problem:  Body Temperature -  Risk of, Imbalanced  Goal: Ability to maintain a body temperature within defined limits  2/14/2021 1046 by Albania Walden RN  Outcome: Ongoing  2/13/2021 2306 by Re Rodriguez RN  Outcome: Ongoing  Goal: Will regain or maintain usual level of consciousness  2/14/2021 1046 by Albania Walden RN  Outcome: Ongoing  2/13/2021 2306 by Re Rodriguez RN  Outcome: Ongoing  Goal: Complications related to the disease process, condition or treatment will be avoided or minimized  2/14/2021 1046 by Albania Walden RN  Outcome: Ongoing  2/13/2021 2306 by Re Rodriguez RN  Outcome: Ongoing     Problem: Isolation Precautions - Risk of Spread of Infection  Goal: Prevent transmission of infection  2/14/2021 1046 by Albania Walden RN  Outcome: Ongoing 2/13/2021 2306 by Ion Thakkar RN  Outcome: Ongoing     Problem: Nutrition Deficits  Goal: Optimize nutritional status  2/14/2021 1046 by Arpit Gallo RN  Outcome: Ongoing  2/13/2021 2306 by Ion Thakkar RN  Outcome: Ongoing     Problem: Risk for Fluid Volume Deficit  Goal: Maintain normal heart rhythm  2/14/2021 1046 by Arpit Gallo RN  Outcome: Ongoing  2/13/2021 2306 by Ion Thakkar RN  Outcome: Ongoing  Goal: Maintain absence of muscle cramping  2/14/2021 1046 by Arpit Gallo RN  Outcome: Ongoing  2/13/2021 2306 by Ion Thakkar RN  Outcome: Ongoing  Goal: Maintain normal serum potassium, sodium, calcium, phosphorus, and pH  2/14/2021 1046 by Arpit Gallo RN  Outcome: Ongoing  2/13/2021 2306 by Ion Thakkar RN  Outcome: Ongoing     Problem: Loneliness or Risk for Loneliness  Goal: Demonstrate positive use of time alone when socialization is not possible  2/14/2021 1046 by Arpit Gallo RN  Outcome: Ongoing  2/13/2021 2306 by Ion Thakkar RN  Outcome: Ongoing     Problem: Fatigue  Goal: Verbalize increase energy and improved vitality  2/14/2021 1046 by Arpit Gallo RN  Outcome: Ongoing  2/13/2021 2306 by Ion Thakkar RN  Outcome: Ongoing     Problem: Patient Education: Go to Patient Education Activity  Goal: Patient/Family Education  2/14/2021 1046 by Arpit Gallo RN  Outcome: Ongoing  2/13/2021 2306 by Ion Thakkar RN  Outcome: Ongoing     Problem: Discharge Planning:  Goal: Discharged to appropriate level of care  Description: Discharged to appropriate level of care  2/14/2021 1046 by Arpit Gallo RN  Outcome: Ongoing  2/13/2021 2306 by Ion Thakkar RN  Outcome: Ongoing     Problem: Pain:  Goal: Pain level will decrease  Description: Pain level will decrease  Outcome: Ongoing  Goal: Control of acute pain  Description: Control of acute pain  Outcome: Ongoing  Goal: Control of chronic pain  Description: Control of chronic pain  Outcome: Ongoing

## 2021-02-14 NOTE — DISCHARGE SUMMARY
Discharge Summary    Kermit Essex Patient Status:  Inpatient    1928 MRN [id number]   Location [address] Attending Venessa Benavidez Day # 3 PCP Clare Hunter MD     Date of Admission: 2021    Date of Discharge: 2021    Admitting Diagnosis: New onset of congestive heart failure ([address] [I50.9]    Discharge Diagnosis:   CHF with preserved EF. Iron deficiency anemia  S/P COVID    Reason for Admission/HPI: Presented with Shortness of breath,cough and hypoxia,recent hospitalization at [facility] for Covid tested positive on 2020     Hospital Course: Patient received IV diuresis ,had anemia requiring transfusion of 1 unit of PRBC,aspirin and lovenox were held. Post transfusion hgb is stable. Iron studies with iron defecation,Hemoccult was negative. Patients breathing is improved,leg swelling is down. Has hypoxia with ambulation so will be discharged home on oxygen to use as needed. patient is agreeable. outapatient hematology consult. Consultations: Cardiology    Procedures: None    Complications: None    Disposition: Home with oxygen    Discharge Condition: Good    Discharge Medications:    Juve Poor   Home Medication Instructions [facility]:[id number]    Printed on:21 1215   Medication Information                      amLODIPine (NORVASC) 5 MG tablet  Take 1 tablet by mouth 2 times daily             blood glucose test strips (ASCENSIA AUTODISC VI;ONE TOUCH ULTRA TEST VI) strip  1 each by In Vitro route daily As needed. carvedilol (COREG) 12.5 MG tablet  Take 1 tablet by mouth 2 times daily (with meals)             ferrous sulfate (IRON 325) 325 (65 Fe) MG tablet  Take 1 tablet by mouth 2 times daily             furosemide (LASIX) 20 MG tablet  Take 1 tablet by mouth daily as needed (leg swelling)             gabapentin (NEURONTIN) 300 MG capsule  Take 2 capsules by mouth 2 times daily for 90 days.              Glycerin, Laxative, (GLYCERIN ADULT) 2.1 g suppository Place 1 suppository rectally as needed (constipation)             hydrALAZINE (APRESOLINE) 25 MG tablet  Take 0.5 tablets by mouth 2 times daily             isosorbide mononitrate (IMDUR) 30 MG extended release tablet  Take 1 tablet by mouth daily             Lancets MISC  Test sugars once daily. R73.03             lisinopril (PRINIVIL;ZESTRIL) 2.5 MG tablet  Take 1 tablet by mouth daily             polyethylene glycol (GLYCOLAX) 17 g packet  Take 17 g by mouth daily             rOPINIRole (REQUIP) 0.25 MG tablet  TAKE 1 TABLET BY MOUTH NIGHTLY             senna (SENOKOT) 8.6 MG tablet  Take 1 tablet by mouth 2 times daily as needed for Constipation                 Follow up Visits: Follow-up with PCP I in 1 week.       Total Time spent with patient and coordinating care:  40 minutes    Main Lara MD  2/14/2021  12:15 PM

## 2021-02-15 ENCOUNTER — TELEPHONE (OUTPATIENT)
Dept: FAMILY MEDICINE CLINIC | Age: 86
End: 2021-02-15

## 2021-02-15 ENCOUNTER — CARE COORDINATION (OUTPATIENT)
Dept: CASE MANAGEMENT | Age: 86
End: 2021-02-15

## 2021-02-15 NOTE — TELEPHONE ENCOUNTER
Granddaughter yancy calling stating she needs to speak to the nurse as she's trying to do pt's meds and none of it makes sense, PCU follow up/dod 2-15

## 2021-02-16 ENCOUNTER — CARE COORDINATION (OUTPATIENT)
Dept: CASE MANAGEMENT | Age: 86
End: 2021-02-16

## 2021-02-18 ENCOUNTER — TELEPHONE (OUTPATIENT)
Dept: FAMILY MEDICINE CLINIC | Age: 86
End: 2021-02-18

## 2021-02-18 NOTE — TELEPHONE ENCOUNTER
Daughter calling stating pt needs a Transcare visit, discharged from [facility] PCU on 2-14, please advise.

## 2021-02-18 NOTE — TELEPHONE ENCOUNTER
Please let her know that below are the meds Rob Sweeney is supposed to be on. She can stop the requip if its not working.       Printed on:02/14/21 5378   Medication Information                                       amLODIPine (NORVASC) 5 MG tablet  Take 1 tablet by mouth 2 times daily                      blood glucose test strips (ASCENSIA AUTODISC VI;ONE TOUCH ULTRA TEST VI) strip  1 each by In Vitro route daily As needed.                      carvedilol (COREG) 12.5 MG tablet  Take 1 tablet by mouth 2 times daily (with meals)                      ferrous sulfate (IRON 325) 325 (65 Fe) MG tablet  Take 1 tablet by mouth 2 times daily                      furosemide (LASIX) 20 MG tablet  Take 1 tablet by mouth daily as needed (leg swelling)                      gabapentin (NEURONTIN) 300 MG capsule  Take 2 capsules by mouth 2 times daily for 90 days.                      Glycerin, Laxative, (GLYCERIN ADULT) 2.1 g suppository  Place 1 suppository rectally as needed (constipation)                      hydrALAZINE (APRESOLINE) 25 MG tablet  Take 0.5 tablets by mouth 2 times daily                      isosorbide mononitrate (IMDUR) 30 MG extended release tablet  Take 1 tablet by mouth daily                      Lancets MISC  Test sugars once daily.   R73.03                      lisinopril (PRINIVIL;ZESTRIL) 2.5 MG tablet  Take 1 tablet by mouth daily                      polyethylene glycol (GLYCOLAX) 17 g packet  Take 17 g by mouth daily                      rOPINIRole (REQUIP) 0.25 MG tablet  TAKE 1 TABLET BY MOUTH NIGHTLY                      senna (SENOKOT) 8.6 MG tablet  Take 1 tablet by mouth 2 times daily as needed for Constipation

## 2021-02-18 NOTE — TELEPHONE ENCOUNTER
Dr. Terry Ramirez,    This is Cnadelaria Raines, I went over to help my mom with my grandmas medicine after returning home from the hospital and I have a few questions. On the list that came home the meds Norvasc, Furosemide, Carvedilol and Lipitor were not on her list so I dont know if she is still to be taking these or not. And she says that the medicine that she takes for her legs is not working and keeping her up at night.  I hope to hear from you soon naz I did not put these meds in her prescription container to take since they were not on the list.      Thanks,  Candelaria Raines

## 2021-02-18 NOTE — TELEPHONE ENCOUNTER
[facility] Transitions Initial Follow Up Call    Outreach made within 2 business days of discharge: Yes    Patient: Deep Pearce Patient : 1928   MRN: [id number]  Reason for Admission: New Onset CHF  Discharge Date: 21       [facility]    Discharge department/facility: [address] [place] [address] PCU    TCM Interactive Patient Contact:  Was patient able to fill all prescriptions: Yes  Was patient instructed to bring all medications to the follow-up visit: Yes  Is patient taking all medications as directed in the discharge summary?  Yes  Does patient understand their discharge instructions: Yes  Does patient have questions or concerns that need addressed prior to 7-14 day follow up office visit: no    Scheduled appointment with PCP within 7-14 days    Follow Up  Future Appointments   Date Time Provider Antonio Lehman   2021  9:20 AM Jany Alfonso MD [facility]   3/4/2021  9:20 AM Watson Corley MD [facility] None       Richad Smoker, LPN

## 2021-02-25 ENCOUNTER — OFFICE VISIT (OUTPATIENT)
Dept: FAMILY MEDICINE CLINIC | Age: 86
End: 2021-02-25
Payer: MEDICARE

## 2021-02-25 VITALS
SYSTOLIC BLOOD PRESSURE: 140 MMHG | HEART RATE: 62 BPM | WEIGHT: 92.1 LBS | HEIGHT: 60 IN | TEMPERATURE: 97.6 F | OXYGEN SATURATION: 96 % | BODY MASS INDEX: 18.08 KG/M2 | DIASTOLIC BLOOD PRESSURE: 50 MMHG

## 2021-02-25 DIAGNOSIS — H61.23 BILATERAL IMPACTED CERUMEN: ICD-10-CM

## 2021-02-25 DIAGNOSIS — M54.50 CHRONIC BILATERAL LOW BACK PAIN WITHOUT SCIATICA: ICD-10-CM

## 2021-02-25 DIAGNOSIS — D64.9 ANEMIA, UNSPECIFIED TYPE: ICD-10-CM

## 2021-02-25 DIAGNOSIS — F41.9 ANXIETY: ICD-10-CM

## 2021-02-25 DIAGNOSIS — G89.29 CHRONIC BILATERAL LOW BACK PAIN WITHOUT SCIATICA: ICD-10-CM

## 2021-02-25 DIAGNOSIS — I50.9 ACUTE HEART FAILURE, UNSPECIFIED HEART FAILURE TYPE (HCC): Primary | ICD-10-CM

## 2021-02-25 PROCEDURE — 69210 REMOVE IMPACTED EAR WAX UNI: CPT | Performed by: FAMILY MEDICINE

## 2021-02-25 PROCEDURE — 99495 TRANSJ CARE MGMT MOD F2F 14D: CPT | Performed by: FAMILY MEDICINE

## 2021-02-25 PROCEDURE — 1111F DSCHRG MED/CURRENT MED MERGE: CPT | Performed by: FAMILY MEDICINE

## 2021-02-25 PROCEDURE — 99212 OFFICE O/P EST SF 10 MIN: CPT | Performed by: FAMILY MEDICINE

## 2021-02-25 RX ORDER — SODIUM CHLORIDE 1000 MG
1 TABLET, SOLUBLE MISCELLANEOUS 2 TIMES DAILY WITH MEALS
Qty: 60 TABLET | Refills: 0
Start: 2021-02-25 | End: 2021-05-27

## 2021-02-25 RX ORDER — BUSPIRONE HYDROCHLORIDE 7.5 MG/1
7.5 TABLET ORAL 3 TIMES DAILY PRN
Qty: 90 TABLET | Refills: 1 | Status: SHIPPED | OUTPATIENT
Start: 2021-02-25 | End: 2021-06-15 | Stop reason: SDUPTHER

## 2021-02-25 ASSESSMENT — ENCOUNTER SYMPTOMS
SHORTNESS OF BREATH: 0
RHINORRHEA: 0
WHEEZING: 0
COUGH: 0
SORE THROAT: 0
CHEST TIGHTNESS: 0

## 2021-02-25 NOTE — PROGRESS NOTES
Post-Discharge Transitional Care Management Services or Hospital Follow Up      Kermit Essex   YOB: 1928    Date of Office Visit:  2/25/2021  Date of Hospital Admission: 2/11/21  Date of Hospital Discharge: 2/14/21  Readmission Risk Score(high >=14%. 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    Early stage nonexudative age-related macular degeneration of both eyes    Endothelial corneal dystrophy    Gastroesophageal reflux disease    Hyperlipidemia, mixed    Prediabetes    Shortness of breath    Constipation    Hyponatremia    New onset of congestive heart failure (HCC)    History of malignant melanoma of skin    COPD [facility]ch[facility] obstructive pulmonary [facility]    PFO (patent foramen ovale)    COVID-19    Chronic left shoulder pain    Vasovagal syncope    NSTEMI (non-ST elevated myocardial infarction) ([facility])    Fall       Allergies   Allergen Reactions    Atorvastatin      Other reaction(s): Muscle Pain    Codeine     Diltiazem Hcl Hives    Levofloxacin      Other reaction(s):  Intolerance-unknown    Pregabalin      lyrica    Simvastatin      Other reaction(s): Muscle Pain       Medications listed as ordered at the time of discharge from hospital   Juve Rae   [place] Medication Instructions [facility]:[id number]    Printed on:02/25/21 5448   Medication Information amLODIPine (NORVASC) 5 MG tablet  Take 1 tablet by mouth 2 times daily             blood glucose test strips (ASCENSIA AUTODISC VI;ONE TOUCH ULTRA TEST VI) strip  1 each by In Vitro route daily As needed. busPIRone (BUSPAR) 7.5 MG tablet  Take 1 tablet by mouth 3 times daily as needed (anxiety)             carvedilol (COREG) 12.5 MG tablet  Take 1 tablet by mouth 2 times daily (with meals)             ferrous sulfate (IRON 325) 325 (65 Fe) MG tablet  Take 1 tablet by mouth 2 times daily             furosemide (LASIX) 20 MG tablet  Take 1 tablet by mouth daily as needed (leg swelling)             gabapentin (NEURONTIN) 300 MG capsule  Take 2 capsules by mouth 2 times daily for 90 days. Glycerin, Laxative, (GLYCERIN ADULT) 2.1 g suppository  Place 1 suppository rectally as needed (constipation)             Handicap Placard MISC  by Does not apply route Exp 3/1/2026             hydrALAZINE (APRESOLINE) 25 MG tablet  Take 0.5 tablets by mouth 2 times daily             isosorbide mononitrate (IMDUR) 30 MG extended release tablet  Take 1 tablet by mouth daily             Lancets MISC  Test sugars once daily.   R73.03             lisinopril (PRINIVIL;ZESTRIL) 2.5 MG tablet  Take 1 tablet by mouth daily             polyethylene glycol (GLYCOLAX) 17 g packet  Take 17 g by mouth daily             rOPINIRole (REQUIP) 0.25 MG tablet  TAKE 1 TABLET BY MOUTH NIGHTLY             senna (SENOKOT) 8.6 MG tablet  Take 1 tablet by mouth 2 times daily as needed for Constipation             sodium chloride 1 g tablet  Take 1 tablet by mouth 2 times daily (with meals)                   Medications marked \"taking\" at this time  Outpatient Medications Marked as Taking for the 2/25/21 encounter (Office Visit) with David Coulter MD   Medication Sig Dispense Refill    sodium chloride 1 g tablet Take 1 tablet by mouth 2 times daily (with meals) 60 tablet 0  busPIRone (BUSPAR) 7.5 MG tablet Take 1 tablet by mouth 3 times daily as needed (anxiety) 90 tablet 1    Handicap Placard MISC by Does not apply route Exp 3/1/2026 1 each 0    amLODIPine (NORVASC) 5 MG tablet Take 1 tablet by mouth 2 times daily 30 tablet 0    rOPINIRole (REQUIP) 0.25 MG tablet TAKE 1 TABLET BY MOUTH NIGHTLY 90 tablet 2    isosorbide mononitrate (IMDUR) 30 MG extended release tablet Take 1 tablet by mouth daily 30 tablet 3    ferrous sulfate (IRON 325) 325 (65 Fe) MG tablet Take 1 tablet by mouth 2 times daily 60 tablet 1    carvedilol (COREG) 12.5 MG tablet Take 1 tablet by mouth 2 times daily (with meals) 180 tablet 2    lisinopril (PRINIVIL;ZESTRIL) 2.5 MG tablet Take 1 tablet by mouth daily 180 tablet 2    furosemide (LASIX) 20 MG tablet Take 1 tablet by mouth daily as needed (leg swelling) 5 tablet 0    Glycerin, Laxative, (GLYCERIN ADULT) 2.1 g suppository Place 1 suppository rectally as needed (constipation) 5 suppository 0    hydrALAZINE (APRESOLINE) 25 MG tablet Take 0.5 tablets by mouth 2 times daily 90 tablet 1        Medications patient taking as of now reconciled against medications ordered at time of hospital discharge: Yes    Chief Complaint   Patient presents with    Follow-Up from Hospital     CHF discharged on 2/14/21 stayed 3 days    Foot Pain     sore on back of the heel for about a week     Here today for a hospital follow up for new onset CHF. She also has pain on her heel, anxiety and plugged ears. CHF: improving; she is feeling less short of breath. She is still using her O2 occasionally but she takes it off frequently and has been doing well without it. She has noticed less swelling in her legs although she did need to take a lasix 3 days ago. After taking the lasix her swelling improved. She is coughing a little bit but she can't get the mucous out. Heel pain: new; she has a sore spot on the back of her heel that has been bothering her for about a week. Her daughter has been keeping a Band-Aid on it and she typically does not wear shoes at home. The area is not red, draining or swollen it is just a small skin tear. Anxiety: worsening; she is really struggling with feeling anxious. She gets overlly excited and worried wiout much prompting. She feels like she needs to take something for anxiety and she would like to to be available prn. Several times she has wanted to take her granddaughters klonopin, but her grand daughter appropriately will not let her have it. She is not currently on anything for anxiety. Ear Fullness   There is pain in both ears. This is a new problem. The current episode started 1 to 4 weeks ago (2 weeks). The problem occurs constantly. The problem has been unchanged. There has been no fever. The pain is at a severity of 0/10. The patient is experiencing no pain. Associated symptoms include hearing loss. Pertinent negatives include no coughing, ear discharge, headaches, neck pain, rhinorrhea or sore throat. She has tried nothing for the symptoms. The treatment provided no relief. Her past medical history is significant for hearing loss. Inpatient course: Discharge summary reviewed- see chart. Interval history/Current status: improving    Review of Systems   Constitutional: Negative for activity change, appetite change, chills, fatigue and fever. HENT: Positive for hearing loss. Negative for ear discharge, rhinorrhea and sore throat. Respiratory: Negative for cough, chest tightness, shortness of breath and wheezing. Cardiovascular: Negative for chest pain, palpitations and leg swelling. Genitourinary: Negative for difficulty urinating. Musculoskeletal: Negative for neck pain. Neurological: Negative for dizziness, syncope, weakness, light-headedness and headaches.        Vitals:    02/25/21 0910 BP: (!) 140/50   Pulse: 62   Temp: 97.6 °F (36.4 °C)   SpO2: 96%   Weight: 92 lb 1.6 oz (41.8 kg)   Height: 5' (1.524 m)     Body mass index is 17.99 kg/m². Wt Readings from Last 3 Encounters:   02/25/21 92 lb 1.6 oz (41.8 kg)   02/14/21 109 lb 3.2 oz (49.5 kg)   10/25/20 93 lb (42.2 kg)     BP Readings from Last 3 Encounters:   02/25/21 (!) 140/50   02/14/21 132/74   10/25/20 (!) 142/49       Physical Exam  Vitals signs and nursing note reviewed. Constitutional:       General: She is not in acute distress. Appearance: She is well-developed. HENT:      Right Ear: There is impacted cerumen. Left Ear: There is impacted cerumen. Eyes:      Conjunctiva/sclera: Conjunctivae normal.   Neck:      Musculoskeletal: Normal range of motion and neck supple. Thyroid: No thyromegaly. Cardiovascular:      Rate and Rhythm: Normal rate and regular rhythm. Heart sounds: Normal heart sounds. No murmur. Pulmonary:      Effort: Pulmonary effort is normal. No respiratory distress. Breath sounds: Normal breath sounds. No wheezing. Lymphadenopathy:      Cervical: No cervical adenopathy. Skin:     General: Skin is warm and dry. Findings: No erythema or rash. Neurological:      Mental Status: She is alert and oriented to person, place, and time. Psychiatric:         Attention and Perception: Attention and perception normal.         Mood and Affect: Mood is anxious. Behavior: Behavior normal.         Thought Content: Thought content normal.         Cognition and Memory: Cognition normal.         Judgment: Judgment normal.             Assessment/Plan:  1. Acute heart failure, unspecified heart failure type [facility])  Improving; she is doing better since discharge. She is less short of breath and she is requiring less O2. I encouraged her to wear her O2 when needed but she was told that she can take it off if she is doing well. I will cntinue her on the lasix prn. - VT DISCHARGE MEDS RECONCILED W/ CURRENT OUTPATIENT MED LIST    2. Anemia, unspecified type  New; found in the hospital. I will refer to hematology     - Cheryle Billow, MD, Hematology/Oncology, [place]    3. Anxiety  Worsening; she is really struggling. I also explained that I don't like to use benzos because it has addictive potential. I told her that I prefer to use buspar for panic attacks and she was willing to try it so a prescription was given. she was given a handout with relaxation exercises. she was not interested in counseling at this time. 4. Bilateral impacted cerumen  Procedure:  Pre procedure diagnosis: bilateral cerumen impaction  Post procedure diagnosis: resolved  Description: Ceruminosis is noted. Wax is removed by syringing and manual debridement with a curette. Instructions for home care to prevent wax buildup are given. - VT REMOVAL IMPACTED CERUMEN INSTRUMENTATION UNILAT    5. Chronic bilateral low back pain without sciatica  - Handicap Placard MISC; by Does not apply route Exp 3/1/2026  Dispense: 1 each; Refill: 0        Medical Decision Making: moderate complexity    Return in about 3 months (around 5/25/2021) for back pain.     June Tena MD  2/25/2021  5:33 PM

## 2021-03-01 RX ORDER — AMLODIPINE BESYLATE 5 MG/1
TABLET ORAL
Qty: 60 TABLET | Refills: 3 | Status: SHIPPED | OUTPATIENT
Start: 2021-03-01 | End: 2021-05-27 | Stop reason: SDUPTHER

## 2021-03-02 DIAGNOSIS — M54.50 CHRONIC MIDLINE LOW BACK PAIN WITHOUT SCIATICA: ICD-10-CM

## 2021-03-02 DIAGNOSIS — G89.29 CHRONIC MIDLINE LOW BACK PAIN WITHOUT SCIATICA: ICD-10-CM

## 2021-03-02 RX ORDER — OXYCODONE AND ACETAMINOPHEN 7.5; 325 MG/1; MG/1
1 TABLET ORAL EVERY 6 HOURS PRN
Qty: 120 TABLET | Refills: 0 | Status: SHIPPED | OUTPATIENT
Start: 2021-03-02 | End: 2021-04-05 | Stop reason: SDUPTHER

## 2021-03-02 RX ORDER — AMLODIPINE BESYLATE 5 MG/1
TABLET ORAL
Qty: 60 TABLET | Refills: 3 | Status: CANCELLED | OUTPATIENT
Start: 2021-03-02

## 2021-03-05 LAB
ANION GAP SERPL CALCULATED.3IONS-SCNC: 12.4 MMOL/L
BACTERIA, URINE: ABNORMAL
BASOPHILS %: 1.32 (ref 0–3)
BASOPHILS ABSOLUTE: 0.06 (ref 0–0.3)
BILIRUBIN URINE: NEGATIVE
BLOOD, URINE: NEGATIVE
BUN BLDV-MCNC: 14 MG/DL (ref 7–17)
CALCIUM SERPL-MCNC: 8.8 MG/DL (ref 8.4–10.2)
CASTS UA: ABNORMAL
CHLORIDE BLD-SCNC: 101 MMOL/L (ref 98–120)
CLARITY: CLEAR
CO2: 29 MMOL/L (ref 22–31)
COLOR, URINE: YELLOW
CREAT SERPL-MCNC: 0.7 MG/DL (ref 0.5–1)
CREATININE, RANDOM URINE: 88.7 MG/DL (ref 20–370)
CRYSTALS, UA: ABNORMAL
EOSINOPHILS %: 2.4 (ref 0–10)
EOSINOPHILS ABSOLUTE: 0.11 (ref 0–1.1)
GFR CALCULATED: > 60
GLUCOSE URINE: NEGATIVE MG/DL
GLUCOSE: 115 MG/DL (ref 65–105)
HCT VFR BLD CALC: 34 % (ref 37–47)
HEMOGLOBIN: 10.8 (ref 12–16)
KETONES, URINE: NEGATIVE MG/DL
LEUKOCYTE ESTERASE, URINE: NEGATIVE
LYMPHOCYTE %: 28.83 (ref 20–51.1)
LYMPHOCYTES ABSOLUTE: 1.37 (ref 1–5.5)
MAGNESIUM: 2 MG/DL (ref 1.6–2.3)
MCH RBC QN AUTO: 27 PG (ref 28.5–32.5)
MCHC RBC AUTO-ENTMCNC: 31.7 G/DL (ref 32–37)
MCV RBC AUTO: 85.3 FL (ref 80–94)
MICROALBUMIN UR-MCNC: > 57 MG/DL (ref 0–1.7)
MICROALBUMIN/CREAT UR-RTO: 642.61
MONOCYTES %: 10.77 (ref 1.7–9.3)
MONOCYTES ABSOLUTE: 0.51 (ref 0.1–1)
MUCUS, URINE: ABNORMAL
NEUTROPHILS %: 56.68 (ref 42.2–75.2)
NEUTROPHILS ABSOLUTE: 2.69 (ref 2–8.1)
NITRITE, URINE: NEGATIVE
PDW BLD-RTO: 18.2 % (ref 8.5–15.5)
PH UA: 6 (ref 5–8.5)
PHOSPHORUS: 4 MG/DL (ref 2.5–4.5)
PLATELET # BLD: 217.2 THOU/MM3 (ref 130–400)
POTASSIUM SERPL-SCNC: 4.4 MMOL/L (ref 3.6–5)
PROTEIN UA: ABNORMAL MG/DL
PROTEIN, URINE: 237 MG/DL (ref 0–12)
PTH INTACT: 47.55 PG/ML (ref 15–65)
RBC URINE: ABNORMAL (ref 0–2)
RBC: 3.99 M/UL (ref 4.2–5.4)
SODIUM BLD-SCNC: 138 MMOL/L (ref 135–145)
SPECIFIC GRAVITY, URINE: 1.02 MG/DL (ref 1–1.03)
SQUAMOUS EPITHELIAL: ABNORMAL
TRICHOMONAS, URINE: ABNORMAL
UROBILINOGEN, URINE: 0.2 MG/DL (ref 0.2–1)
WBC URINE: ABNORMAL (ref 0–4)
WBC: 4.7 THOU/ML3 (ref 4.8–10.8)
YEAST, URINE: ABNORMAL

## 2021-03-08 ENCOUNTER — OFFICE VISIT (OUTPATIENT)
Dept: ONCOLOGY | Age: 86
End: 2021-03-08
Payer: MEDICARE

## 2021-03-08 VITALS
DIASTOLIC BLOOD PRESSURE: 64 MMHG | WEIGHT: 92 LBS | TEMPERATURE: 98.1 F | HEART RATE: 63 BPM | RESPIRATION RATE: 16 BRPM | OXYGEN SATURATION: 97 % | SYSTOLIC BLOOD PRESSURE: 162 MMHG | HEIGHT: 60 IN | BODY MASS INDEX: 18.06 KG/M2

## 2021-03-08 DIAGNOSIS — D50.0 IRON DEFICIENCY ANEMIA DUE TO CHRONIC BLOOD LOSS: ICD-10-CM

## 2021-03-08 DIAGNOSIS — D64.9 ANEMIA, UNSPECIFIED TYPE: Primary | ICD-10-CM

## 2021-03-08 PROCEDURE — 99204 OFFICE O/P NEW MOD 45 MIN: CPT | Performed by: INTERNAL MEDICINE

## 2021-03-08 PROCEDURE — G8427 DOCREV CUR MEDS BY ELIG CLIN: HCPCS | Performed by: INTERNAL MEDICINE

## 2021-03-08 PROCEDURE — 1111F DSCHRG MED/CURRENT MED MERGE: CPT | Performed by: INTERNAL MEDICINE

## 2021-03-08 PROCEDURE — 1123F ACP DISCUSS/DSCN MKR DOCD: CPT | Performed by: INTERNAL MEDICINE

## 2021-03-08 PROCEDURE — G8419 CALC BMI OUT NRM PARAM NOF/U: HCPCS | Performed by: INTERNAL MEDICINE

## 2021-03-08 PROCEDURE — 1036F TOBACCO NON-USER: CPT | Performed by: INTERNAL MEDICINE

## 2021-03-08 PROCEDURE — G8484 FLU IMMUNIZE NO ADMIN: HCPCS | Performed by: INTERNAL MEDICINE

## 2021-03-08 PROCEDURE — 99214 OFFICE O/P EST MOD 30 MIN: CPT | Performed by: INTERNAL MEDICINE

## 2021-03-08 PROCEDURE — 1090F PRES/ABSN URINE INCON ASSESS: CPT | Performed by: INTERNAL MEDICINE

## 2021-03-08 PROCEDURE — 4040F PNEUMOC VAC/ADMIN/RCVD: CPT | Performed by: INTERNAL MEDICINE

## 2021-03-08 NOTE — PROGRESS NOTES
Patient ID: Evelyn Warren, 8/16/1928, [id number], 80 y.o. Referred by : Morgan Galeano MD  Reason for consultation:   Anemia  HISTORY OF PRESENT ILLNESS:    Hematologic History:   Evelyn Warren is a 80 y.o. female who was seen during initial consultation visit for anemia. Patient was recently admitted to hospital in February for shortness of breath and was noted to have low hemoglobin. Patient has received PRBC transfusion and was noted to have iron deficiency therefore started on iron pills twice daily. Patient also had been diagnosed with Covid infection in December. She denies any chest pain, shortness of breath. Her fatigue is better. She denies any blood in stool. She is taking iron pills twice daily and tolerating well. Her most recent hemoglobin showed improvement from 7.9 to now 10.8. 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     L4 & L5    Cancer ([facility] [address]     skin cancers    Depression     History of heart artery stent     Hyperlipidemia     Hypertension     Nerve pain        Past Surgical History:   Procedure Laterality Date    APPENDECTOMY  56    CARDIAC CATHETERIZATION  04/2002    with bare metal RCA stent placement    CATARACT REMOVAL WITH IMPLANT Bilateral     2005 and 1998; lens implant bilat    LUMBAR DISC SURGERY  2012    lumbar surgery on L4 and L5        Allergies   Allergen Reactions    Atorvastatin      Other reaction(s): Muscle Pain    Codeine     Diltiazem Hcl Hives    Levofloxacin      Other reaction(s): Intolerance-unknown    Pregabalin      lyrica    Simvastatin      Other reaction(s): Muscle Pain       Current Outpatient Medications   Medication Sig Dispense Refill    oxyCODONE-acetaminophen (PERCOCET) 7.5-325 MG per tablet Take 1 tablet by mouth every 6 hours as needed for Pain for up to 30 days.  Intended supply: 30 days 120 tablet 0    amLODIPine (NORVASC) 5 MG tablet Take 1 tablet by mouth twice daily 60 tablet 3    sodium chloride 1 g tablet Take 1 tablet by mouth 2 times daily (with meals) 60 tablet 0    busPIRone (BUSPAR) 7.5 MG tablet Take 1 tablet by mouth 3 times daily as needed (anxiety) 90 tablet 1    Handicap Placard MISC by Does not apply route Exp 3/1/2026 1 each 0    rOPINIRole (REQUIP) 0.25 MG tablet TAKE 1 TABLET BY MOUTH NIGHTLY 90 tablet 2    isosorbide mononitrate (IMDUR) 30 MG extended release tablet Take 1 tablet by mouth daily 30 tablet 3    ferrous sulfate (IRON 325) 325 (65 Fe) MG tablet Take 1 tablet by mouth 2 times daily 60 tablet 1    carvedilol (COREG) 12.5 MG tablet Take 1 tablet by mouth 2 times daily (with meals) 180 tablet 2    lisinopril (PRINIVIL;ZESTRIL) 2.5 MG tablet Take 1 tablet by mouth daily 180 tablet 2    furosemide (LASIX) 20 MG tablet Take 1 tablet by mouth daily as needed (leg swelling) 5 tablet 0    Glycerin, Laxative, (GLYCERIN ADULT) 2.1 g suppository Place 1 suppository rectally as needed (constipation) 5 suppository 0    hydrALAZINE (APRESOLINE) 25 MG tablet Take 0.5 tablets by mouth 2 times daily 90 tablet 1    polyethylene glycol (GLYCOLAX) 17 g packet Take 17 g by mouth daily      gabapentin (NEURONTIN) 300 MG capsule Take 2 capsules by mouth 2 times daily for 90 days. 360 capsule 0    blood glucose test strips (ASCENSIA AUTODISC VI;ONE TOUCH ULTRA TEST VI) strip 1 each by In Vitro route daily As needed. (Patient not taking: Reported on 2/25/2021) 100 each 1    Lancets MISC Test sugars once daily. R73.03 (Patient not taking: Reported on 2/25/2021) 100 each 1     No current facility-administered medications for this visit. Social History     Socioeconomic History    Marital status:       Spouse name: Not on file    Number of children: Not on file    Years of education: Not on file    Highest education level: Not on file   Occupational History    Not on file   Social Needs    Financial resource strain: Not on file    Food insecurity     Worry: Not on file     Inability: Not on file    Transportation needs     Medical: Not on file     Non-medical: Not on file   Tobacco Use    Smoking status: Never Smoker    Smokeless tobacco: Never Used   Substance and Sexual Activity    Alcohol use: No    Drug use: No    Sexual activity: Not Currently     Partners: Male   Lifestyle    Physical activity     Days per week: Not on file     Minutes per session: Not on file    Stress: Not on file   Relationships    Social connections     Talks on phone: Not on file     Gets together: Not on file     Attends Jain service: Not on file     Active member of club or organization: Not on file     Attends meetings of clubs or organizations: Not on file     Relationship status: Not on file    Intimate partner violence     Fear of current or ex partner: Not on file     Emotionally abused: Not on file     Physically abused: Not on file     Forced sexual activity: Not on file   Other Topics Concern    Not on file   Social History Narrative    Not on file       Family History   Problem Relation Age of Onset    Hypertension Mother     Stroke Father     Hypertension Father     Heart Disease Natural Sibling     Diabetes Natural Sibling     Cancer Natural Sibling         REVIEW OF SYSTEM:     Constitutional: No fever or chills. No night sweats, no weight loss   Eyes: No eye discharge, double vision, or eye pain   HEENT: negative for sore mouth, sore throat, hoarseness and voice change   Respiratory: negative for cough , sputum, dyspnea, wheezing, hemoptysis, chest pain   Cardiovascular: negative for chest pain, dyspnea, palpitations, orthopnea, PND   Gastrointestinal: negative for nausea, vomiting, diarrhea, constipation, abdominal pain, Dysphagia, hematemesis and hematochezia   Genitourinary: negative for frequency, dysuria, nocturia, urinary incontinence, and hematuria   Integument: negative for rash, skin lesions, bruises. Hematologic/Lymphatic: negative for easy bruising, bleeding, lymphadenopathy, petechiae and swelling/edema   Endocrine: negative for heat or cold intolerance, tremor, weight changes, change in bowel habits and hair loss   Musculoskeletal: negative for myalgias, arthralgias, pain, joint swelling,and bone pain   Neurological: negative for headaches, dizziness, seizures, weakness, numbness   OBJECTIVE:         Vitals:    03/08/21 1111   BP: (!) 176/74   Pulse: 63   Resp: 16   Temp: 98.1 °F (36.7 °C)   SpO2: 97%       PHYSICAL EXAM:   General appearance - well appearing, no in pain or distress   Mental status - alert and cooperative   Eyes - pupils equal and reactive, extraocular eye movements intact   Ears - bilateral TM's and external ear canals normal   Mouth - mucous membranes moist, pharynx normal without lesions   Neck - supple, no significant adenopathy   Lymphatics - no palpable lymphadenopathy, no hepatosplenomegaly   Chest - clear to auscultation, no wheezes, rales or rhonchi, symmetric air entry   Heart - normal rate, regular rhythm, normal S1, S2, no murmurs, rubs, clicks or gallops   Abdomen - soft, nontender, nondistended, no masses or organomegaly   Neurological - alert, oriented, normal speech, no focal findings or movement disorder noted   Musculoskeletal - no joint tenderness, deformity or swelling   Extremities - peripheral pulses normal, no pedal edema, no clubbing or cyanosis   Skin - normal coloration and turgor, no rashes, no suspicious skin lesions noted   LABORATORY DATA:     Lab Results   Component Value Date    WBC 4.7 (L) 03/05/2021    HGB 10.8 (L) 03/05/2021    HCT 34.0 (L) 03/05/2021    MCV 85.3 03/05/2021    .2 03/05/2021    LYMPHOPCT 28.83 03/05/2021    RBC 3.99 (L) 03/05/2021    MCH 27.0 (L) 03/05/2021    MCHC 31.7 (L) 03/05/2021    RDW 18.2 (H) 03/05/2021    MONOPCT 12 02/14/2021    BASOPCT 0 02/14/2021    NEUTROABS 2.690 03/05/2021    LYMPHSABS 1.368 03/05/2021    MONOSABS 0.5109 03/05/2021    EOSABS 0.1136 03/05/2021    BASOSABS 0.0628 03/05/2021         Chemistry        Component Value Date/Time     03/05/2021 0844    K 4.4 03/05/2021 0844     03/05/2021 0844    CO2 29 03/05/2021 0844    BUN 14 03/05/2021 0844    CREATININE 0.7 03/05/2021 0844        Component Value Date/Time    CALCIUM 8.8 03/05/2021 0844    ALKPHOS 69 02/11/2021 1651    AST 28 02/11/2021 1651    ALT 17 02/11/2021 1651    BILITOT 0.33 02/11/2021 1651        PATHOLOGY DATA:   Reviewed   IMAGING DATA:    Reviewed  ASSESSMENT:    Kermit Essex is a 80 y.o. female with iron deficiency anemia. I reviewed the prior records, laboratory data, imaging studies, diagnosis and treatment options with patient and family. Currently she is on oral iron therapy and tolerating well. It seems that with replacement of oral iron her hemoglobin has improved to 10.8. At this point I do not suspect any primary bone marrow pathology. I would repeat her CBC and iron studies in 3 months from now and also get a peripheral blood smear at that time. If her hemoglobin continues to be low will decide about further work-up including bone marrow biopsy or next-generation sequencing. During today's visit, the patient and the family had a number of reasonable questions which were answered to their satisfaction. They verbalized understanding of the information provided and they agreed to proceed as outlined above. PLAN:   Continue iron pills twice daily  Return to clinic in 3 months with labs prior      Manpreet Moon MD  Hematologist/Medical Oncologist      This note is created with the assistance of a speech recognition program.  While intending to generate a document that actually reflects the content of the visit, the document can still have some errors including those of syntax and sound a like substitutions which may escape proof reading. It such instances, actual meaning can be extrapolated by contextual diversion.
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
## 2021-03-22 DIAGNOSIS — G89.29 CHRONIC BILATERAL LOW BACK PAIN WITHOUT SCIATICA: Primary | ICD-10-CM

## 2021-03-22 DIAGNOSIS — M54.50 CHRONIC BILATERAL LOW BACK PAIN WITHOUT SCIATICA: Primary | ICD-10-CM

## 2021-03-22 RX ORDER — GABAPENTIN 300 MG/1
600 CAPSULE ORAL 2 TIMES DAILY
Qty: 120 CAPSULE | Refills: 2 | Status: SHIPPED | OUTPATIENT
Start: 2021-03-22 | End: 2021-07-26

## 2021-03-22 RX ORDER — ISOSORBIDE MONONITRATE 30 MG/1
30 TABLET, EXTENDED RELEASE ORAL DAILY
Qty: 30 TABLET | Refills: 3 | Status: SHIPPED | OUTPATIENT
Start: 2021-03-22 | End: 2021-05-27 | Stop reason: SDUPTHER

## 2021-03-22 NOTE — TELEPHONE ENCOUNTER
Per [facility], Gabapentin was filled by [facility] filled 1/3/2021, #120/30 days    Does this provider fill Gabapentin? Digna Kimball called requesting a refill of the below medication which has been pended for you:     Requested Prescriptions     Pending Prescriptions Disp Refills    isosorbide mononitrate (IMDUR) 30 MG extended release tablet 30 tablet 3     Sig: Take 1 tablet by mouth daily    gabapentin (NEURONTIN) 300 MG capsule  0     Sig: Take 2 capsules by mouth 2 times daily for 90 days. Last Appointment Date: 2/25/2021  Next Appointment Date: 5/27/2021    Allergies   Allergen Reactions    Atorvastatin      Other reaction(s): Muscle Pain    Codeine     Diltiazem Hcl Hives    Levofloxacin      Other reaction(s):  Intolerance-unknown    Pregabalin      lyrica    Simvastatin      Other reaction(s): Muscle Pain

## 2021-04-05 DIAGNOSIS — G89.29 CHRONIC MIDLINE LOW BACK PAIN WITHOUT SCIATICA: ICD-10-CM

## 2021-04-05 DIAGNOSIS — M54.50 CHRONIC MIDLINE LOW BACK PAIN WITHOUT SCIATICA: ICD-10-CM

## 2021-04-05 RX ORDER — OXYCODONE AND ACETAMINOPHEN 7.5; 325 MG/1; MG/1
1 TABLET ORAL EVERY 6 HOURS PRN
Qty: 120 TABLET | Refills: 0 | Status: SHIPPED | OUTPATIENT
Start: 2021-04-05 | End: 2021-05-04 | Stop reason: SDUPTHER

## 2021-04-05 NOTE — TELEPHONE ENCOUNTER
Per [facility], last filled 3/2/2021, #120/30 days      Dragan Barrett called requesting a refill of the below medication which has been pended for you:     Requested Prescriptions     Pending Prescriptions Disp Refills    oxyCODONE-acetaminophen (PERCOCET) 7.5-325 MG per tablet 120 tablet 0     Sig: Take 1 tablet by mouth every 6 hours as needed for Pain for up to 30 days. Intended supply: 30 days       Last Appointment Date: 2/25/2021  Next Appointment Date: 5/27/2021    Allergies   Allergen Reactions    Atorvastatin      Other reaction(s): Muscle Pain    Codeine     Diltiazem Hcl Hives    Levofloxacin      Other reaction(s):  Intolerance-unknown    Pregabalin      lyrica    Simvastatin      Other reaction(s): Muscle Pain

## 2021-04-14 ENCOUNTER — TELEPHONE (OUTPATIENT)
Dept: FAMILY MEDICINE CLINIC | Age: 86
End: 2021-04-14

## 2021-04-14 RX ORDER — PNV NO.95/FERROUS FUM/FOLIC AC 28MG-0.8MG
TABLET ORAL
Qty: 60 TABLET | Refills: 3 | Status: SHIPPED | OUTPATIENT
Start: 2021-04-14 | End: 2021-05-27 | Stop reason: SDUPTHER

## 2021-04-14 NOTE — TELEPHONE ENCOUNTER
Granddaughter calling stating pt's daughter is very upset as pt saw the \"kidney doctor\" today and a few meds were changed, state they took pt off her sodium and they don't understand why, also put pt on some type of liquid medication and told pt to reduce fluids but did not give them a diet to follow, please advise of date and time when you can work in for an appt. Pt returns to ED with c/o five day hx of productive cough with subjective fevers. Pt denies any vomiting. Pt is concerned for chemical exposure. Pt travelled from [facility] yesterday and states he smelled a sweet chemical smell in baggage claim that made him have several violent coughing attacks. Pt appears quite anxious.

## 2021-04-15 LAB
ANION GAP SERPL CALCULATED.3IONS-SCNC: 9.5 MMOL/L
BUN BLDV-MCNC: 19 MG/DL (ref 7–17)
CALCIUM SERPL-MCNC: 8.9 MG/DL (ref 8.4–10.2)
CHLORIDE BLD-SCNC: 102 MMOL/L (ref 98–120)
CO2: 26 MMOL/L (ref 22–31)
CREAT SERPL-MCNC: 0.9 MG/DL (ref 0.5–1)
GFR CALCULATED: > 60
GLUCOSE: 225 MG/DL (ref 65–105)
POTASSIUM SERPL-SCNC: 4.5 MMOL/L (ref 3.6–5)
SODIUM BLD-SCNC: 138 MMOL/L (ref 135–145)

## 2021-04-15 NOTE — TELEPHONE ENCOUNTER
JUAN    Spoke to daughter, she wanted to know if Dr Sunny Mueller was agreeable to what the nephrologist did with the medications. Did assure pt that Dr Sunny Mueller was agreeable and per Dr Sunny Mueller, she wasn't going to change what they did. Brittany wanted to know if we could refer her to a dietician to know how much water was in individual foods. Recommended to [facility] that she should speak to the nephrologist office for that information. Brittany voiced understanding. [facility] also stated that they \"just wanted to make an appointment to speak to Dr Paulo Billings, if writer could let her know when she is in UC. \"  Nicole Landers that UC is only for urgent care matters. Asked if there is something that needed to be addressed? [facility] stated that \"no, mom just wanted to come in and talk to her. \"

## 2021-04-22 LAB
ANION GAP SERPL CALCULATED.3IONS-SCNC: 7.3 MMOL/L
BUN BLDV-MCNC: 20 MG/DL (ref 7–17)
CALCIUM SERPL-MCNC: 9.2 MG/DL (ref 8.4–10.2)
CHLORIDE BLD-SCNC: 100 MMOL/L (ref 98–120)
CO2: 30 MMOL/L (ref 22–31)
CREAT SERPL-MCNC: 0.9 MG/DL (ref 0.5–1)
GFR CALCULATED: > 60
GLUCOSE: 127 MG/DL (ref 65–105)
POTASSIUM SERPL-SCNC: 4.3 MMOL/L (ref 3.6–5)
SODIUM BLD-SCNC: 138 MMOL/L (ref 135–145)

## 2021-04-29 LAB
ANION GAP SERPL CALCULATED.3IONS-SCNC: 7.2 MMOL/L
BUN BLDV-MCNC: 21 MG/DL (ref 7–17)
CALCIUM SERPL-MCNC: 9.2 MG/DL (ref 8.4–10.2)
CHLORIDE BLD-SCNC: 100 MMOL/L (ref 98–120)
CO2: 31 MMOL/L (ref 22–31)
CREAT SERPL-MCNC: 0.9 MG/DL (ref 0.5–1)
GFR CALCULATED: > 60
GLUCOSE: 142 MG/DL (ref 65–105)
POTASSIUM SERPL-SCNC: 4.6 MMOL/L (ref 3.6–5)
SODIUM BLD-SCNC: 138 MMOL/L (ref 135–145)

## 2021-05-04 DIAGNOSIS — M54.50 CHRONIC MIDLINE LOW BACK PAIN WITHOUT SCIATICA: ICD-10-CM

## 2021-05-04 DIAGNOSIS — G89.29 CHRONIC MIDLINE LOW BACK PAIN WITHOUT SCIATICA: ICD-10-CM

## 2021-05-04 RX ORDER — OXYCODONE AND ACETAMINOPHEN 7.5; 325 MG/1; MG/1
1 TABLET ORAL EVERY 6 HOURS PRN
Qty: 120 TABLET | Refills: 0 | Status: SHIPPED | OUTPATIENT
Start: 2021-05-04 | End: 2021-05-27 | Stop reason: SDUPTHER

## 2021-05-04 NOTE — TELEPHONE ENCOUNTER
Per [facility], last filled , 4/5/2021, #120/30 days      Yvon Lake called requesting a refill of the below medication which has been pended for you:     Requested Prescriptions     Pending Prescriptions Disp Refills    oxyCODONE-acetaminophen (PERCOCET) 7.5-325 MG per tablet 120 tablet 0     Sig: Take 1 tablet by mouth every 6 hours as needed for Pain for up to 30 days. Intended supply: 30 days       Last Appointment Date: 2/25/2021  Next Appointment Date: 5/27/2021    Allergies   Allergen Reactions    Atorvastatin      Other reaction(s): Muscle Pain    Codeine     Diltiazem Hcl Hives    Levofloxacin      Other reaction(s):  Intolerance-unknown    Pregabalin      lyrica    Simvastatin      Other reaction(s): Muscle Pain

## 2021-05-25 ENCOUNTER — TELEPHONE (OUTPATIENT)
Dept: FAMILY MEDICINE CLINIC | Age: 86
End: 2021-05-25

## 2021-05-25 DIAGNOSIS — D64.9 ANEMIA, UNSPECIFIED TYPE: ICD-10-CM

## 2021-05-25 DIAGNOSIS — E87.1 HYPONATREMIA: Primary | ICD-10-CM

## 2021-05-25 NOTE — TELEPHONE ENCOUNTER
Pt had Sodium drawn 4/29/2021, normal  And CBC 3/5/2021, (HGB 10.8)   Has appointment with Rivka on 6/21 to repeat labs. Do you want to repeat prior to appt?

## 2021-05-27 ENCOUNTER — HOSPITAL ENCOUNTER (OUTPATIENT)
Dept: LAB | Age: 86
Discharge: HOME | End: 2021-05-27
Payer: MEDICARE

## 2021-05-27 ENCOUNTER — OFFICE VISIT (OUTPATIENT)
Dept: FAMILY MEDICINE CLINIC | Age: 86
End: 2021-05-27
Payer: MEDICARE

## 2021-05-27 VITALS
SYSTOLIC BLOOD PRESSURE: 160 MMHG | TEMPERATURE: 98.6 F | DIASTOLIC BLOOD PRESSURE: 80 MMHG | HEIGHT: 60 IN | WEIGHT: 92 LBS | BODY MASS INDEX: 18.06 KG/M2 | HEART RATE: 95 BPM | OXYGEN SATURATION: 100 %

## 2021-05-27 DIAGNOSIS — E87.1 HYPONATREMIA: ICD-10-CM

## 2021-05-27 DIAGNOSIS — G89.29 CHRONIC MIDLINE LOW BACK PAIN WITHOUT SCIATICA: Primary | ICD-10-CM

## 2021-05-27 DIAGNOSIS — D64.9 ANEMIA, UNSPECIFIED TYPE: ICD-10-CM

## 2021-05-27 DIAGNOSIS — J41.8 MIXED SIMPLE AND MUCOPURULENT CHRONIC BRONCHITIS (HCC): ICD-10-CM

## 2021-05-27 DIAGNOSIS — M54.50 CHRONIC MIDLINE LOW BACK PAIN WITHOUT SCIATICA: Primary | ICD-10-CM

## 2021-05-27 LAB
ABSOLUTE EOS #: 0.07 K/UL (ref 0–0.44)
ABSOLUTE EOS #: 0.07 K/UL (ref 0–0.44)
ABSOLUTE IMMATURE GRANULOCYTE: <0.03 K/UL (ref 0–0.3)
ABSOLUTE IMMATURE GRANULOCYTE: <0.03 K/UL (ref 0–0.3)
ABSOLUTE LYMPH #: 2.55 K/UL (ref 1.1–3.7)
ABSOLUTE LYMPH #: 2.55 K/UL (ref 1.1–3.7)
ABSOLUTE MONO #: 0.78 K/UL (ref 0.1–1.2)
ABSOLUTE MONO #: 0.78 K/UL (ref 0.1–1.2)
ABSOLUTE RETIC #: 0.05 M/UL (ref 0.03–0.08)
ANION GAP SERPL CALCULATED.3IONS-SCNC: 11 MMOL/L (ref 9–17)
BASOPHILS # BLD: 0 % (ref 0–2)
BASOPHILS # BLD: 0 % (ref 0–2)
BASOPHILS ABSOLUTE: 0.03 K/UL (ref 0–0.2)
BASOPHILS ABSOLUTE: 0.03 K/UL (ref 0–0.2)
BUN BLDV-MCNC: 14 MG/DL (ref 8–23)
BUN/CREAT BLD: 16 (ref 9–20)
CALCIUM SERPL-MCNC: 9.3 MG/DL (ref 8.6–10.4)
CHLORIDE BLD-SCNC: 96 MMOL/L (ref 98–107)
CO2: 27 MMOL/L (ref 20–31)
CREAT SERPL-MCNC: 0.88 MG/DL (ref 0.5–0.9)
DIFFERENTIAL TYPE: ABNORMAL
DIFFERENTIAL TYPE: ABNORMAL
EOSINOPHILS RELATIVE PERCENT: 1 % (ref 1–4)
EOSINOPHILS RELATIVE PERCENT: 1 % (ref 1–4)
FERRITIN: 96 UG/L (ref 13–150)
GFR AFRICAN AMERICAN: >60 ML/MIN
GFR NON-AFRICAN AMERICAN: >60 ML/MIN
GFR SERPL CREATININE-BSD FRML MDRD: ABNORMAL ML/MIN/{1.73_M2}
GFR SERPL CREATININE-BSD FRML MDRD: ABNORMAL ML/MIN/{1.73_M2}
GLUCOSE BLD-MCNC: 137 MG/DL (ref 70–99)
HCT VFR BLD CALC: 43.8 % (ref 36.3–47.1)
HCT VFR BLD CALC: 43.8 % (ref 36.3–47.1)
HEMOGLOBIN: 13.5 G/DL (ref 11.9–15.1)
HEMOGLOBIN: 13.5 G/DL (ref 11.9–15.1)
IMMATURE GRANULOCYTES: 0 %
IMMATURE GRANULOCYTES: 0 %
IMMATURE RETIC FRACT: 4.7 % (ref 2.7–18.3)
IRON SATURATION: 25 % (ref 20–55)
IRON: 63 UG/DL (ref 37–145)
LYMPHOCYTES # BLD: 33 % (ref 24–43)
LYMPHOCYTES # BLD: 33 % (ref 24–43)
MCH RBC QN AUTO: 29.1 PG (ref 25.2–33.5)
MCH RBC QN AUTO: 29.1 PG (ref 25.2–33.5)
MCHC RBC AUTO-ENTMCNC: 30.8 G/DL (ref 25.2–33.5)
MCHC RBC AUTO-ENTMCNC: 30.8 G/DL (ref 25.2–33.5)
MCV RBC AUTO: 94.4 FL (ref 82.6–102.9)
MCV RBC AUTO: 94.4 FL (ref 82.6–102.9)
MONOCYTES # BLD: 10 % (ref 3–12)
MONOCYTES # BLD: 10 % (ref 3–12)
NRBC AUTOMATED: 0 PER 100 WBC
NRBC AUTOMATED: 0 PER 100 WBC
PDW BLD-RTO: 15.6 % (ref 11.8–14.4)
PDW BLD-RTO: 15.6 % (ref 11.8–14.4)
PLATELET # BLD: 263 K/UL (ref 138–453)
PLATELET # BLD: 263 K/UL (ref 138–453)
PLATELET ESTIMATE: ABNORMAL
PLATELET ESTIMATE: ABNORMAL
PMV BLD AUTO: 9.9 FL (ref 8.1–13.5)
PMV BLD AUTO: 9.9 FL (ref 8.1–13.5)
POTASSIUM SERPL-SCNC: 4.3 MMOL/L (ref 3.7–5.3)
RBC # BLD: 4.64 M/UL (ref 3.95–5.11)
RBC # BLD: 4.64 M/UL (ref 3.95–5.11)
RBC # BLD: ABNORMAL 10*6/UL
RBC # BLD: ABNORMAL 10*6/UL
RETIC %: 1 % (ref 0.5–1.9)
RETIC HEMOGLOBIN: 34.6 PG (ref 28.2–35.7)
SEG NEUTROPHILS: 55 % (ref 36–65)
SEG NEUTROPHILS: 56 % (ref 36–65)
SEGMENTED NEUTROPHILS ABSOLUTE COUNT: 4.31 K/UL (ref 1.5–8.1)
SEGMENTED NEUTROPHILS ABSOLUTE COUNT: 4.31 K/UL (ref 1.5–8.1)
SODIUM BLD-SCNC: 134 MMOL/L (ref 135–144)
TOTAL IRON BINDING CAPACITY: 254 UG/DL (ref 250–450)
UNSATURATED IRON BINDING CAPACITY: 191 UG/DL (ref 112–347)
WBC # BLD: 7.8 K/UL (ref 3.5–11.3)
WBC # BLD: 7.8 K/UL (ref 3.5–11.3)
WBC # BLD: ABNORMAL 10*3/UL
WBC # BLD: ABNORMAL 10*3/UL

## 2021-05-27 PROCEDURE — 1123F ACP DISCUSS/DSCN MKR DOCD: CPT | Performed by: FAMILY MEDICINE

## 2021-05-27 PROCEDURE — 1036F TOBACCO NON-USER: CPT | Performed by: FAMILY MEDICINE

## 2021-05-27 PROCEDURE — 1090F PRES/ABSN URINE INCON ASSESS: CPT | Performed by: FAMILY MEDICINE

## 2021-05-27 PROCEDURE — 99213 OFFICE O/P EST LOW 20 MIN: CPT

## 2021-05-27 PROCEDURE — G8926 SPIRO NO PERF OR DOC: HCPCS | Performed by: FAMILY MEDICINE

## 2021-05-27 PROCEDURE — 85045 AUTOMATED RETICULOCYTE COUNT: CPT

## 2021-05-27 PROCEDURE — 4040F PNEUMOC VAC/ADMIN/RCVD: CPT | Performed by: FAMILY MEDICINE

## 2021-05-27 PROCEDURE — G8419 CALC BMI OUT NRM PARAM NOF/U: HCPCS | Performed by: FAMILY MEDICINE

## 2021-05-27 PROCEDURE — 99214 OFFICE O/P EST MOD 30 MIN: CPT | Performed by: FAMILY MEDICINE

## 2021-05-27 PROCEDURE — 36415 COLL VENOUS BLD VENIPUNCTURE: CPT

## 2021-05-27 PROCEDURE — 85025 COMPLETE CBC W/AUTO DIFF WBC: CPT

## 2021-05-27 PROCEDURE — 80048 BASIC METABOLIC PNL TOTAL CA: CPT

## 2021-05-27 PROCEDURE — 3023F SPIROM DOC REV: CPT | Performed by: FAMILY MEDICINE

## 2021-05-27 PROCEDURE — 83550 IRON BINDING TEST: CPT

## 2021-05-27 PROCEDURE — 83540 ASSAY OF IRON: CPT

## 2021-05-27 PROCEDURE — 82728 ASSAY OF FERRITIN: CPT

## 2021-05-27 PROCEDURE — G8427 DOCREV CUR MEDS BY ELIG CLIN: HCPCS | Performed by: FAMILY MEDICINE

## 2021-05-27 RX ORDER — OXYCODONE AND ACETAMINOPHEN 7.5; 325 MG/1; MG/1
1 TABLET ORAL EVERY 6 HOURS PRN
Qty: 120 TABLET | Refills: 0 | Status: SHIPPED | OUTPATIENT
Start: 2021-05-27 | End: 2021-07-06 | Stop reason: SDUPTHER

## 2021-05-27 RX ORDER — OXYCODONE AND ACETAMINOPHEN 7.5; 325 MG/1; MG/1
1 TABLET ORAL EVERY 6 HOURS PRN
Qty: 120 TABLET | Refills: 0 | Status: SHIPPED | OUTPATIENT
Start: 2021-05-27 | End: 2021-05-27 | Stop reason: SDUPTHER

## 2021-05-27 RX ORDER — PNV NO.95/FERROUS FUM/FOLIC AC 28MG-0.8MG
1 TABLET ORAL DAILY
Qty: 30 TABLET | Refills: 1
Start: 2021-05-27 | End: 2021-06-03 | Stop reason: SDUPTHER

## 2021-05-27 RX ORDER — AMLODIPINE BESYLATE 5 MG/1
5 TABLET ORAL 2 TIMES DAILY
Qty: 180 TABLET | Refills: 1 | Status: SHIPPED | OUTPATIENT
Start: 2021-05-27 | End: 2021-08-31 | Stop reason: SDUPTHER

## 2021-05-27 RX ORDER — ISOSORBIDE MONONITRATE 30 MG/1
30 TABLET, EXTENDED RELEASE ORAL DAILY
Qty: 90 TABLET | Refills: 1 | Status: SHIPPED | OUTPATIENT
Start: 2021-05-27 | End: 2021-08-31 | Stop reason: SDUPTHER

## 2021-05-27 ASSESSMENT — ENCOUNTER SYMPTOMS
WHEEZING: 0
SHORTNESS OF BREATH: 1
BACK PAIN: 1
CHEST TIGHTNESS: 0
COUGH: 0

## 2021-05-27 NOTE — PROGRESS NOTES
mouth every 6 hours as needed for Pain for up to 30 days. Intended supply: 30 days 120 tablet 0    isosorbide mononitrate (IMDUR) 30 MG extended release tablet Take 1 tablet by mouth daily 90 tablet 1    amLODIPine (NORVASC) 5 MG tablet Take 1 tablet by mouth 2 times daily 180 tablet 1    gabapentin (NEURONTIN) 300 MG capsule Take 2 capsules by mouth 2 times daily for 90 days. 120 capsule 2    busPIRone (BUSPAR) 7.5 MG tablet Take 1 tablet by mouth 3 times daily as needed (anxiety) 90 tablet 1    Handicap Placard MISC by Does not apply route Exp 3/1/2026 1 each 0    rOPINIRole (REQUIP) 0.25 MG tablet TAKE 1 TABLET BY MOUTH NIGHTLY 90 tablet 2    carvedilol (COREG) 12.5 MG tablet Take 1 tablet by mouth 2 times daily (with meals) 180 tablet 2    lisinopril (PRINIVIL;ZESTRIL) 2.5 MG tablet Take 1 tablet by mouth daily 180 tablet 2    furosemide (LASIX) 20 MG tablet Take 1 tablet by mouth daily as needed (leg swelling) 5 tablet 0    Glycerin, Laxative, (GLYCERIN ADULT) 2.1 g suppository Place 1 suppository rectally as needed (constipation) 5 suppository 0    hydrALAZINE (APRESOLINE) 25 MG tablet Take 0.5 tablets by mouth 2 times daily 90 tablet 1    polyethylene glycol (GLYCOLAX) 17 g packet Take 17 g by mouth daily      blood glucose test strips (ASCENSIA AUTODISC VI;ONE TOUCH ULTRA TEST VI) strip 1 each by In Vitro route daily As needed. 100 each 1    Lancets MISC Test sugars once daily. R73.03 100 each 1     No current facility-administered medications for this visit. Allergies   Allergen Reactions    Atorvastatin      Other reaction(s): Muscle Pain    Codeine     Diltiazem Hcl Hives    Levofloxacin      Other reaction(s): Intolerance-unknown    Pregabalin      lyrica    Simvastatin      Other reaction(s): Muscle Pain       Subjective:     Review of Systems   Constitutional: Negative for activity change, appetite change, chills, fatigue and fever. Eyes: Negative for visual disturbance. Respiratory: Positive for shortness of breath (occasionally). Negative for cough, chest tightness and wheezing. Cardiovascular: Negative for chest pain, palpitations and leg swelling. Genitourinary: Negative for difficulty urinating. Musculoskeletal: Positive for back pain. Negative for gait problem. Neurological: Negative for dizziness, syncope, weakness, light-headedness and headaches. Objective:      Physical Exam  Vitals and nursing note reviewed. Constitutional:       General: She is not in acute distress. Appearance: She is well-developed. Eyes:      Conjunctiva/sclera: Conjunctivae normal.   Neck:      Thyroid: No thyromegaly. Cardiovascular:      Rate and Rhythm: Normal rate and regular rhythm. Heart sounds: Normal heart sounds. No murmur heard. Pulmonary:      Effort: Pulmonary effort is normal. No respiratory distress. Breath sounds: Normal breath sounds. No wheezing. Musculoskeletal:      Cervical back: Normal range of motion and neck supple. Lymphadenopathy:      Cervical: No cervical adenopathy. Skin:     General: Skin is warm and dry. Findings: No erythema or rash. Neurological:      Mental Status: She is alert and oriented to person, place, and time. BP (!) 160/80   Pulse 95   Temp 98.6 °F (37 °C)   Ht 5' (1.524 m)   Wt 92 lb (41.7 kg)   SpO2 100%   BMI 17.97 kg/m²     Wt Readings from Last 3 Encounters:   05/27/21 92 lb (41.7 kg)   03/08/21 92 lb (41.7 kg)   02/25/21 92 lb 1.6 oz (41.8 kg)         Assessment:       Diagnosis Orders   1. Chronic midline low back pain without sciatica  oxyCODONE-acetaminophen (PERCOCET) 7.5-325 MG per tablet   2. Mixed simple and mucopurulent chronic bronchitis ([address]     3. Anemia, unspecified type  CBC Auto Differential   4. Hyponatremia               Plan:        Back pain: stable; she is doing well on the percocet so I refilled it and she was given a 3 month's supply.      Controlled Substance Monitoring:    Acute and Chronic Pain Monitoring:   RX Monitoring 5/27/2021   Attestation -   Periodic Controlled Substance Monitoring Possible medication side effects, risk of tolerance/dependence & alternative treatments discussed. ;No signs of potential drug abuse or diversion identified. ;Assessed functional status. ;Obtaining appropriate analgesic effect of treatment. Chronic Pain > 50 MEDD -   Chronic Pain > 80 MEDD Obtained or confirmed \"Medication Contract\" on file. COPD: stable; she has not had any issues. I advised her to continue the O2 at night. Anemia: improving; I decreased her iron dose down to 1 tab daily and will recheck her hgb in a month. If she drops at all I will increase her iron. She just hates taking it so I offered to try a lower dose. Hyponatremia: resolved; her sodium is normal even without the salt tablets. Return in about 3 months (around 8/27/2021) for [address] Orders Placed This Encounter   Procedures    CBC Auto Differential     Standing Status:   Future     Standing Expiration Date:   5/27/2022     Orders Placed This Encounter   Medications    Ferrous Sulfate (IRON) 325 (65 Fe) MG TABS     Sig: Take 1 tablet by mouth daily     Dispense:  30 tablet     Refill:  1    oxyCODONE-acetaminophen (PERCOCET) 7.5-325 MG per tablet     Sig: Take 1 tablet by mouth every 6 hours as needed for Pain for up to 30 days. Intended supply: 30 days     Dispense:  120 tablet     Refill:  0     Do not fill until 6/3/21    isosorbide mononitrate (IMDUR) 30 MG extended release tablet     Sig: Take 1 tablet by mouth daily     Dispense:  90 tablet     Refill:  1    amLODIPine (NORVASC) 5 MG tablet     Sig: Take 1 tablet by mouth 2 times daily     Dispense:  180 tablet     Refill:  1       Patientgiven educational materials - see patient instructions. Discussed use, benefit,and side effects of prescribed medications. All patient questions answered. Ptvoiced understanding.  Reviewed health maintenance. Instructed to continue currentmedications, diet and exercise. Patient agreed with treatment plan. Follow up asdirected.      Electronically signed by Low Jones MD on 5/27/2021 at 1:07 PM

## 2021-05-28 LAB — SURGICAL PATHOLOGY REPORT: NORMAL

## 2021-05-31 LAB — PATHOLOGIST REVIEW: NORMAL

## 2021-06-02 DIAGNOSIS — I10 ESSENTIAL HYPERTENSION: ICD-10-CM

## 2021-06-02 DIAGNOSIS — D64.9 ANEMIA, UNSPECIFIED TYPE: Primary | ICD-10-CM

## 2021-06-02 NOTE — TELEPHONE ENCOUNTER
Daughter states she wants pt on 3 tabs of iron a day because that keeps pt's Hgb where it's supposed to be and she's afraid at 1 tab daily she will have trouble

## 2021-06-03 RX ORDER — HYDRALAZINE HYDROCHLORIDE 25 MG/1
12.5 TABLET, FILM COATED ORAL 2 TIMES DAILY
Qty: 90 TABLET | Refills: 1 | Status: SHIPPED | OUTPATIENT
Start: 2021-06-03 | End: 2021-08-31 | Stop reason: SDUPTHER

## 2021-06-03 RX ORDER — PNV NO.95/FERROUS FUM/FOLIC AC 28MG-0.8MG
1 TABLET ORAL 3 TIMES DAILY
Qty: 90 TABLET | Refills: 1 | Status: SHIPPED | OUTPATIENT
Start: 2021-06-03 | End: 2021-07-26

## 2021-06-15 RX ORDER — BUSPIRONE HYDROCHLORIDE 7.5 MG/1
7.5 TABLET ORAL 3 TIMES DAILY PRN
Qty: 90 TABLET | Refills: 3 | Status: SHIPPED | OUTPATIENT
Start: 2021-06-15 | End: 2021-08-31 | Stop reason: SDUPTHER

## 2021-06-15 NOTE — TELEPHONE ENCOUNTER
Alexandra Smith called requesting a refill of the below medication which has been pended for you:     Requested Prescriptions     Pending Prescriptions Disp Refills    busPIRone (BUSPAR) 7.5 MG tablet 90 tablet 1     Sig: Take 1 tablet by mouth 3 times daily as needed (anxiety)       Last Appointment Date: 5/27/2021  Next Appointment Date: 8/31/2021    Allergies   Allergen Reactions    Atorvastatin      Other reaction(s): Muscle Pain    Codeine     Diltiazem Hcl Hives    Levofloxacin      Other reaction(s):  Intolerance-unknown    Pregabalin      lyrica    Simvastatin      Other reaction(s): Muscle Pain

## 2021-06-21 ENCOUNTER — OFFICE VISIT (OUTPATIENT)
Dept: ONCOLOGY | Age: 86
End: 2021-06-21
Payer: MEDICARE

## 2021-06-21 VITALS
OXYGEN SATURATION: 93 % | HEIGHT: 60 IN | WEIGHT: 94.6 LBS | SYSTOLIC BLOOD PRESSURE: 138 MMHG | DIASTOLIC BLOOD PRESSURE: 72 MMHG | TEMPERATURE: 97.3 F | BODY MASS INDEX: 18.57 KG/M2 | HEART RATE: 86 BPM

## 2021-06-21 DIAGNOSIS — D64.9 ANEMIA, UNSPECIFIED TYPE: Primary | ICD-10-CM

## 2021-06-21 PROCEDURE — 99214 OFFICE O/P EST MOD 30 MIN: CPT | Performed by: INTERNAL MEDICINE

## 2021-06-21 PROCEDURE — 1123F ACP DISCUSS/DSCN MKR DOCD: CPT | Performed by: INTERNAL MEDICINE

## 2021-06-21 PROCEDURE — 1090F PRES/ABSN URINE INCON ASSESS: CPT | Performed by: INTERNAL MEDICINE

## 2021-06-21 PROCEDURE — 1036F TOBACCO NON-USER: CPT | Performed by: INTERNAL MEDICINE

## 2021-06-21 PROCEDURE — G8419 CALC BMI OUT NRM PARAM NOF/U: HCPCS | Performed by: INTERNAL MEDICINE

## 2021-06-21 PROCEDURE — G8427 DOCREV CUR MEDS BY ELIG CLIN: HCPCS | Performed by: INTERNAL MEDICINE

## 2021-06-21 PROCEDURE — 4040F PNEUMOC VAC/ADMIN/RCVD: CPT | Performed by: INTERNAL MEDICINE

## 2021-06-21 NOTE — PROGRESS NOTES
Patient ID: Asmita Ambrose, 8/16/1928, [id number], 80 y.o. Referred by : Alejo Murillo MD  Reason for consultation:   Anemia  Iron deficiency  Currently on iron twice a day  HISTORY OF PRESENT ILLNESS:    Hematologic History:   Asmita Ambrose is a 80 y.o. female who was seen during initial consultation visit for anemia. Patient was recently admitted to hospital in February for shortness of breath and was noted to have low hemoglobin. Patient has received PRBC transfusion and was noted to have iron deficiency therefore started on iron pills twice daily. Patient also had been diagnosed with Covid infection in December. She denies any chest pain, shortness of breath. Her fatigue is better. She denies any blood in stool. She is taking iron pills twice daily and tolerating well. Her most recent hemoglobin showed improvement from 7.9 to now 10.8. INTERVAL HISTORY:   Patient is return for follow-up return to discuss lab results and further recommendations. Currently she is taking iron pill twice a day. She does have mild constipation but she is taking MiraLAX and otherwise tolerating well. She denies any blood in stool. Her recent hemoglobin is normal and her iron stores are significantly improved. During this visit patient's allergy, social, medical, surgical history and medications were reviewed and updated.       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     L4 & L5    Cancer ([address]     skin cancers    Depression     History of heart artery stent     Hyperlipidemia     Hypertension     Nerve pain        Past Surgical History:   Procedure Laterality Date    APPENDECTOMY  56    CARDIAC CATHETERIZATION  04/2002    with bare metal RCA stent placement    CATARACT REMOVAL WITH IMPLANT Bilateral     2005 and 1998; lens implant bilat    LUMBAR One Arch Pb SURGERY  2012    lumbar surgery on L4 and L5        Allergies   Allergen Reactions    Atorvastatin      Other reaction(s): Muscle Pain    Codeine     Diltiazem Hcl Hives    Levofloxacin      Other reaction(s): Intolerance-unknown    Pregabalin      lyrica    Simvastatin      Other reaction(s): Muscle Pain       Current Outpatient Medications   Medication Sig Dispense Refill    busPIRone (BUSPAR) 7.5 MG tablet Take 1 tablet by mouth 3 times daily as needed (anxiety) 90 tablet 3    hydrALAZINE (APRESOLINE) 25 MG tablet Take 0.5 tablets by mouth 2 times daily 90 tablet 1    Ferrous Sulfate (IRON) 325 (65 Fe) MG TABS Take 1 tablet by mouth 3 times daily 90 tablet 1    isosorbide mononitrate (IMDUR) 30 MG extended release tablet Take 1 tablet by mouth daily 90 tablet 1    amLODIPine (NORVASC) 5 MG tablet Take 1 tablet by mouth 2 times daily 180 tablet 1    oxyCODONE-acetaminophen (PERCOCET) 7.5-325 MG per tablet Take 1 tablet by mouth every 6 hours as needed for Pain for up to 30 days. Intended supply: 30 days 120 tablet 0    gabapentin (NEURONTIN) 300 MG capsule Take 2 capsules by mouth 2 times daily for 90 days. 120 capsule 2    Handicap Placard MISC by Does not apply route Exp 3/1/2026 1 each 0    rOPINIRole (REQUIP) 0.25 MG tablet TAKE 1 TABLET BY MOUTH NIGHTLY 90 tablet 2    carvedilol (COREG) 12.5 MG tablet Take 1 tablet by mouth 2 times daily (with meals) 180 tablet 2    lisinopril (PRINIVIL;ZESTRIL) 2.5 MG tablet Take 1 tablet by mouth daily 180 tablet 2    furosemide (LASIX) 20 MG tablet Take 1 tablet by mouth daily as needed (leg swelling) 5 tablet 0    Glycerin, Laxative, (GLYCERIN ADULT) 2.1 g suppository Place 1 suppository rectally as needed (constipation) 5 suppository 0    polyethylene glycol (GLYCOLAX) 17 g packet Take 17 g by mouth daily      blood glucose test strips (ASCENSIA AUTODISC VI;ONE TOUCH ULTRA TEST VI) strip 1 each by In Vitro route daily As needed. 100 each 1    Lancets MISC Test sugars once daily.   R73.03 100 each 1     No current facility-administered medications for this visit. Social History     Socioeconomic History    Marital status:      Spouse name: Not on file    Number of children: Not on file    Years of education: Not on file    Highest education level: Not on file   Occupational History    Not on file   Tobacco Use    Smoking status: Never Smoker    Smokeless tobacco: Never Used   Vaping Use    Vaping Use: Never used   Substance and Sexual Activity    Alcohol use: No    Drug use: No    Sexual activity: Not Currently     Partners: Male   Other Topics Concern    Not on file   Social History Narrative    Not on file     Social Determinants of Health     Financial Resource Strain:     Difficulty of Paying Living Expenses:    Food Insecurity:     Worried About Running Out of Food in the Last Year:     [address] in the Last Year:    Transportation Needs:     Lack of Transportation (Medical):  Lack of Transportation (Non-Medical):    Physical Activity:     Days of Exercise per Week:     Minutes of Exercise per Session:    Stress:     Feeling of Stress :    Social Connections:     Frequency of Communication with Friends and Family:     Frequency of Social Gatherings with Friends and Family:     Attends [employer] Services:     Active Member of Clubs or Organizations:     Attends Club or Organization Meetings:     Marital Status:    Intimate Partner Violence:     Fear of Current or Ex-Partner:     Emotionally Abused:     Physically Abused:     Sexually Abused:        Family History   Problem Relation Age of Onset    Hypertension Mother     Stroke Father     Hypertension Father     Heart Disease Natural Sibling     Diabetes Natural Sibling     Cancer Natural Sibling         REVIEW OF SYSTEM:     Constitutional: No fever or chills.  No night sweats, no weight loss   Eyes: No eye discharge, double vision, or eye pain   HEENT: negative for sore mouth, sore throat, hoarseness and voice change   Respiratory: negative for cough , sputum, dyspnea, wheezing, hemoptysis, chest pain   Cardiovascular: negative for chest pain, dyspnea, palpitations, orthopnea, PND   Gastrointestinal: negative for nausea, vomiting, diarrhea, constipation, abdominal pain, Dysphagia, hematemesis and hematochezia   Genitourinary: negative for frequency, dysuria, nocturia, urinary incontinence, and hematuria   Integument: negative for rash, skin lesions, bruises.    Hematologic/Lymphatic: negative for easy bruising, bleeding, lymphadenopathy, petechiae and swelling/edema   Endocrine: negative for heat or cold intolerance, tremor, weight changes, change in bowel habits and hair loss   Musculoskeletal: negative for myalgias, arthralgias, pain, joint swelling,and bone pain   Neurological: negative for headaches, dizziness, seizures, weakness, numbness   OBJECTIVE:         Vitals:    06/21/21 1310   BP: 138/72   Pulse: 86   Temp: 97.3 °F (36.3 °C)   SpO2: 93%       PHYSICAL EXAM:   General appearance - well appearing, no in pain or distress   Mental status - alert and cooperative   Eyes - pupils equal and reactive, extraocular eye movements intact   Ears - bilateral TM's and external ear canals normal   Mouth - mucous membranes moist, pharynx normal without lesions   Neck - supple, no significant adenopathy   Lymphatics - no palpable lymphadenopathy, no hepatosplenomegaly   Chest - clear to auscultation, no wheezes, rales or rhonchi, symmetric air entry   Heart - normal rate, regular rhythm, normal S1, S2, no murmurs, rubs, clicks or gallops   Abdomen - soft, nontender, nondistended, no masses or organomegaly   Neurological - alert, oriented, normal speech, no focal findings or movement disorder noted   Musculoskeletal - no joint tenderness, deformity or swelling   Extremities - peripheral pulses normal, no pedal edema, no clubbing or cyanosis   Skin - normal coloration and turgor, no rashes, no suspicious skin lesions noted   LABORATORY DATA:     Lab Results   Component Value Date    WBC 7.8 05/27/2021    WBC 7.8 05/27/2021    HGB 13.5 05/27/2021    HGB 13.5 05/27/2021    HCT 43.8 05/27/2021    HCT 43.8 05/27/2021    MCV 94.4 05/27/2021    MCV 94.4 05/27/2021     05/27/2021     05/27/2021    LYMPHOPCT 33 05/27/2021    LYMPHOPCT 33 05/27/2021    RBC 4.64 05/27/2021    RBC 4.64 05/27/2021    MCH 29.1 05/27/2021    MCH 29.1 05/27/2021    MCHC 30.8 05/27/2021    MCHC 30.8 05/27/2021    RDW 15.6 (H) 05/27/2021    RDW 15.6 (H) 05/27/2021    MONOPCT 10 05/27/2021    MONOPCT 10 05/27/2021    BASOPCT 0 05/27/2021    BASOPCT 0 05/27/2021    NEUTROABS 4.31 05/27/2021    NEUTROABS 4.31 05/27/2021    LYMPHSABS 2.55 05/27/2021    LYMPHSABS 2.55 05/27/2021    MONOSABS 0.78 05/27/2021    MONOSABS 0.78 05/27/2021    EOSABS 0.07 05/27/2021    EOSABS 0.07 05/27/2021    BASOSABS 0.03 05/27/2021    BASOSABS 0.03 05/27/2021         Chemistry        Component Value Date/Time     (L) 05/27/2021 1038    K 4.3 05/27/2021 1038    CL 96 (L) 05/27/2021 1038    CO2 27 05/27/2021 1038    BUN 14 05/27/2021 1038    CREATININE 0.88 05/27/2021 1038        Component Value Date/Time    CALCIUM 9.3 05/27/2021 1038    ALKPHOS 69 02/11/2021 1651    AST 28 02/11/2021 1651    ALT 17 02/11/2021 1651    BILITOT 0.33 02/11/2021 1651        PATHOLOGY DATA:   Reviewed   IMAGING DATA:    Reviewed  ASSESSMENT:    Nelwyn Punter is a 80 y.o. female with iron deficiency anemia. I reviewed the prior records, laboratory data, imaging studies, diagnosis and treatment options with patient and family. Currently she is on oral iron therapy and tolerating well. During today's visit, the patient and the family had a number of reasonable questions which were answered to their satisfaction. They verbalized understanding of the information provided and they agreed to proceed as outlined above.    PLAN:   I reviewed her recent lab work and discussed treatment plan with patient and family   Her hemoglobin and iron stores have improved significantly   continue iron pills twice daily and if her constipation gets worse we will cut down the dose to once daily  Return to clinic in 6 months with labs prior      Manpreet Raymundo MD  Hematologist/Medical Oncologist      This note is created with the assistance of a speech recognition program.  While intending to generate a document that actually reflects the content of the visit, the document can still have some errors including those of syntax and sound a like substitutions which may escape proof reading. It such instances, actual meaning can be extrapolated by contextual diversion.

## 2021-07-06 DIAGNOSIS — G89.29 CHRONIC MIDLINE LOW BACK PAIN WITHOUT SCIATICA: ICD-10-CM

## 2021-07-06 DIAGNOSIS — M54.50 CHRONIC MIDLINE LOW BACK PAIN WITHOUT SCIATICA: ICD-10-CM

## 2021-07-06 LAB
ALBUMIN: 4.3 G/DL (ref 3.5–5)
BACTERIA, URINE: ABNORMAL
BASOPHILS %: 0.73 (ref 0–3)
BASOPHILS ABSOLUTE: 0.07 (ref 0–0.3)
BILIRUBIN URINE: NEGATIVE
BLOOD, URINE: ABNORMAL
CASTS UA: ABNORMAL
CLARITY: CLEAR
COLOR, URINE: YELLOW
CRYSTALS, UA: ABNORMAL
EOSINOPHILS %: 1.04 (ref 0–10)
EOSINOPHILS ABSOLUTE: 0.09 (ref 0–1.1)
GLUCOSE URINE: NEGATIVE MG/DL
HCT VFR BLD CALC: 41.2 % (ref 37–47)
HEMOGLOBIN: 12.8 (ref 12–16)
KETONES, URINE: NEGATIVE MG/DL
LEUKOCYTE ESTERASE, URINE: NEGATIVE
LYMPHOCYTE %: 26.94 (ref 20–51.1)
LYMPHOCYTES ABSOLUTE: 2.4 (ref 1–5.5)
MAGNESIUM: 2.1 MG/DL (ref 1.6–2.3)
MCH RBC QN AUTO: 29.4 PG (ref 28.5–32.5)
MCHC RBC AUTO-ENTMCNC: 31.1 G/DL (ref 32–37)
MCV RBC AUTO: 94.5 FL (ref 80–94)
MONOCYTES %: 9.18 (ref 1.7–9.3)
MONOCYTES ABSOLUTE: 0.82 (ref 0.1–1)
MUCUS, URINE: ABNORMAL
NEUTROPHILS %: 62.1 (ref 42.2–75.2)
NEUTROPHILS ABSOLUTE: 5.54 (ref 2–8.1)
NITRITE, URINE: NEGATIVE
PDW BLD-RTO: 12.4 % (ref 8.5–15.5)
PH UA: 6 (ref 5–8.5)
PLATELET # BLD: 258.7 THOU/MM3 (ref 130–400)
PROTEIN UA: ABNORMAL MG/DL
RBC URINE: ABNORMAL (ref 0–2)
RBC: 4.36 M/UL (ref 4.2–5.4)
SPECIFIC GRAVITY, URINE: 1.02 MG/DL (ref 1–1.03)
SQUAMOUS EPITHELIAL: ABNORMAL
TRICHOMONAS, URINE: ABNORMAL
UROBILINOGEN, URINE: 0.2 MG/DL (ref 0.2–1)
VITAMIN D 25-HYDROXY: 30.1 NG/ML (ref 30–100)
WBC URINE: ABNORMAL (ref 0–4)
WBC: 8.9 THOU/ML3 (ref 4.8–10.8)
YEAST, URINE: ABNORMAL

## 2021-07-06 RX ORDER — OXYCODONE AND ACETAMINOPHEN 7.5; 325 MG/1; MG/1
1 TABLET ORAL EVERY 6 HOURS PRN
Qty: 120 TABLET | Refills: 0 | Status: SHIPPED | OUTPATIENT
Start: 2021-07-06 | End: 2021-08-05 | Stop reason: SDUPTHER

## 2021-07-06 NOTE — TELEPHONE ENCOUNTER
Per [facility], last filled 6/4/2021, #120/30 days        [facility] called requesting a refill of the below medication which has been pended for you:     Requested Prescriptions     Pending Prescriptions Disp Refills    oxyCODONE-acetaminophen (PERCOCET) 7.5-325 MG per tablet 120 tablet 0     Sig: Take 1 tablet by mouth every 6 hours as needed for Pain for up to 30 days. Intended supply: 30 days       Last Appointment Date: 5/27/2021  Next Appointment Date: 8/31/2021    Allergies   Allergen Reactions    Atorvastatin      Other reaction(s): Muscle Pain    Codeine     Diltiazem Hcl Hives    Levofloxacin      Other reaction(s):  Intolerance-unknown    Pregabalin      lyrica    Simvastatin      Other reaction(s): Muscle Pain

## 2021-08-04 DIAGNOSIS — G89.29 CHRONIC MIDLINE LOW BACK PAIN WITHOUT SCIATICA: ICD-10-CM

## 2021-08-04 DIAGNOSIS — M54.50 CHRONIC MIDLINE LOW BACK PAIN WITHOUT SCIATICA: ICD-10-CM

## 2021-08-04 NOTE — TELEPHONE ENCOUNTER
[facility] called requesting a refill of the below medication which has been pended for you: [place] from [place] reviewed. PERCOCET 7.5-325 mg last filled 7/7/21 # 120/30 days. Report available for your review. Requested Prescriptions     Pending Prescriptions Disp Refills    oxyCODONE-acetaminophen (PERCOCET) 7.5-325 MG per tablet 120 tablet 0     Sig: Take 1 tablet by mouth every 6 hours as needed for Pain for up to 30 days. Intended supply: 30 days       Last Appointment Date: 5/27/2021  Next Appointment Date: 8/31/2021    Allergies   Allergen Reactions    Atorvastatin      Other reaction(s): Muscle Pain    Codeine     Diltiazem Hcl Hives    Levofloxacin      Other reaction(s):  Intolerance-unknown    Pregabalin      lyrica    Simvastatin      Other reaction(s): Muscle Pain

## 2021-08-05 RX ORDER — OXYCODONE AND ACETAMINOPHEN 7.5; 325 MG/1; MG/1
1 TABLET ORAL EVERY 6 HOURS PRN
Qty: 120 TABLET | Refills: 0 | Status: SHIPPED | OUTPATIENT
Start: 2021-08-06 | End: 2021-09-05

## 2021-08-05 NOTE — TELEPHONE ENCOUNTER
Controlled Substance Monitoring:    Acute and Chronic Pain Monitoring:   RX Monitoring 8/5/2021   Attestation -   Periodic Controlled Substance Monitoring No signs of potential drug abuse or diversion identified.    -    Obtained or confirmed \"Medication Contract\" on file.

## 2021-08-11 ENCOUNTER — NURSE TRIAGE (OUTPATIENT)
Dept: OTHER | Facility: CLINIC | Age: 86
End: 2021-08-11

## 2021-08-11 NOTE — TELEPHONE ENCOUNTER
Reason for Disposition   MODERATE difficulty breathing (e.g., speaks in phrases, SOB even at rest, pulse 100-120) of new onset or worse than normal    Answer Assessment - Initial Assessment Questions  1. RESPIRATORY STATUS: \"Describe your breathing? \" (e.g., wheezing, shortness of breath, unable to speak, severe coughing)       Sitting there winded - walking it is much worse    2. ONSET: \"When did this breathing problem begin? \"       A few weeks or more    3. PATTERN \"Does the difficult breathing come and go, or has it been constant since it started? \"       Constant    4. SEVERITY: \"How bad is your breathing? \" (e.g., mild, moderate, severe)     - MILD: No SOB at rest, mild SOB with walking, speaks normally in sentences, can lay down, no retractions, pulse < 100.     - MODERATE: SOB at rest, SOB with minimal exertion and prefers to sit, cannot lie down flat, speaks in phrases, mild retractions, audible wheezing, pulse 100-120.     - SEVERE: Very SOB at rest, speaks in single words, struggling to breathe, sitting hunched forward, retractions, pulse > 120       Moderate    5. RECURRENT SYMPTOM: \"Have you had difficulty breathing before? \" If so, ask: \"When was the last time? \" and \"What happened that time? \"       Yes- unsure    6. CARDIAC HISTORY: \"Do you have any history of heart disease? \" (e.g., heart attack, angina, bypass surgery, angioplasty)       Yes- stint    7. LUNG HISTORY: \"Do you have any history of lung disease? \"  (e.g., pulmonary embolus, asthma, emphysema)      No    8. CAUSE: \"What do you think is causing the breathing problem? \"       Unsure    9. OTHER SYMPTOMS: \"Do you have any other symptoms? (e.g., dizziness, runny nose, cough, chest pain, fever)      No    10. PREGNANCY: \"Is there any chance you are pregnant? \" \"When was your last menstrual period? \"        N/a    11. TRAVEL: \"Have you traveled out of the country in the last month? \" (e.g., travel history, exposures)        n/a    Protocols used: BREATHING DIFFICULTY-ADULT-[place]    Received call from [address] at [facility] with [employer]. Brief description of triage: Caller called in originally for swelling near the eye that is painful. Caller states she is having difficulty breathing and it is bothering her. Caller states it is happening while she is sitting there and is more pronounced when she moves around, but it is happening while sitting there. Caller denies any chest pain or fever. Triage indicates for patient to Go to ED Now. Care advice provided, patient verbalizes understanding; denies any other questions or concerns; instructed to call back for any new or worsening symptoms. Attention Provider: Thank you for allowing me to participate in the care of your patient. The patient was connected to triage in response to information provided to the ECC. Please do not respond through this encounter as the response is not directed to a shared pool.

## 2021-08-13 ENCOUNTER — OFFICE VISIT (OUTPATIENT)
Dept: PRIMARY CARE CLINIC | Age: 86
End: 2021-08-13
Payer: MEDICARE

## 2021-08-13 VITALS
WEIGHT: 91.2 LBS | TEMPERATURE: 97.9 F | SYSTOLIC BLOOD PRESSURE: 150 MMHG | BODY MASS INDEX: 17.22 KG/M2 | RESPIRATION RATE: 16 BRPM | OXYGEN SATURATION: 96 % | HEIGHT: 61 IN | DIASTOLIC BLOOD PRESSURE: 74 MMHG | HEART RATE: 80 BPM

## 2021-08-13 DIAGNOSIS — H04.551 OBSTRUCTION OF RIGHT TEAR DUCT: Primary | ICD-10-CM

## 2021-08-13 PROCEDURE — G8427 DOCREV CUR MEDS BY ELIG CLIN: HCPCS | Performed by: NURSE PRACTITIONER

## 2021-08-13 PROCEDURE — 4040F PNEUMOC VAC/ADMIN/RCVD: CPT | Performed by: NURSE PRACTITIONER

## 2021-08-13 PROCEDURE — 1036F TOBACCO NON-USER: CPT | Performed by: NURSE PRACTITIONER

## 2021-08-13 PROCEDURE — 1090F PRES/ABSN URINE INCON ASSESS: CPT | Performed by: NURSE PRACTITIONER

## 2021-08-13 PROCEDURE — 1123F ACP DISCUSS/DSCN MKR DOCD: CPT | Performed by: NURSE PRACTITIONER

## 2021-08-13 PROCEDURE — G8419 CALC BMI OUT NRM PARAM NOF/U: HCPCS | Performed by: NURSE PRACTITIONER

## 2021-08-13 PROCEDURE — 99213 OFFICE O/P EST LOW 20 MIN: CPT

## 2021-08-13 PROCEDURE — 99212 OFFICE O/P EST SF 10 MIN: CPT

## 2021-08-13 PROCEDURE — 99213 OFFICE O/P EST LOW 20 MIN: CPT | Performed by: NURSE PRACTITIONER

## 2021-08-13 RX ORDER — CIPROFLOXACIN 500 MG/1
TABLET, FILM COATED ORAL
COMMUNITY
Start: 2021-08-05 | End: 2021-08-31

## 2021-08-13 ASSESSMENT — PATIENT HEALTH QUESTIONNAIRE - PHQ9
2. FEELING DOWN, DEPRESSED OR HOPELESS: 0
SUM OF ALL RESPONSES TO PHQ QUESTIONS 1-9: 0
1. LITTLE INTEREST OR PLEASURE IN DOING THINGS: 0
SUM OF ALL RESPONSES TO PHQ QUESTIONS 1-9: 0
SUM OF ALL RESPONSES TO PHQ QUESTIONS 1-9: 0
SUM OF ALL RESPONSES TO PHQ9 QUESTIONS 1 & 2: 0

## 2021-08-13 ASSESSMENT — ENCOUNTER SYMPTOMS
EYE DISCHARGE: 1
WHEEZING: 0
SHORTNESS OF BREATH: 0
EYE REDNESS: 1
COUGH: 0

## 2021-08-13 NOTE — PROGRESS NOTES
[address] [place] [address]  [address] [facility], [id number]  [phone]      HPI:     HPI  Pt presents to the clinic for evaluation of a lump near her right eye. She went to see optometrist about 1 week ago and was told she had a clogged tear duct. They put her on oral cipro. She was doing warm compresses but has stopped. The area is tender to the touch. She denies fevers. She does not have any changes in vision. She was supposed to follow up with opthalmology however she can not get in until the beginning of September. She does not think she can wait that long. She has no further concerns. Current Outpatient Medications   Medication Sig Dispense Refill    ciprofloxacin (CIPRO) 500 MG tablet TAKE 1 TABLET BY MOUTH TWICE DAILY FOR 10 DAYS      oxyCODONE-acetaminophen (PERCOCET) 7.5-325 MG per tablet Take 1 tablet by mouth every 6 hours as needed for Pain for up to 30 days.  Intended supply: 30 days 120 tablet 0    Ferrous Sulfate (IRON) 325 (65 Fe) MG TABS TAKE 1 TABLET BY MOUTH THREE TIMES DAILY 90 tablet 0    gabapentin (NEURONTIN) 300 MG capsule Take 2 capsules by mouth twice daily 120 capsule 2    busPIRone (BUSPAR) 7.5 MG tablet Take 1 tablet by mouth 3 times daily as needed (anxiety) 90 tablet 3    hydrALAZINE (APRESOLINE) 25 MG tablet Take 0.5 tablets by mouth 2 times daily 90 tablet 1    isosorbide mononitrate (IMDUR) 30 MG extended release tablet Take 1 tablet by mouth daily 90 tablet 1    amLODIPine (NORVASC) 5 MG tablet Take 1 tablet by mouth 2 times daily 180 tablet 1    Handicap Placard MISC by Does not apply route Exp 3/1/2026 1 each 0    rOPINIRole (REQUIP) 0.25 MG tablet TAKE 1 TABLET BY MOUTH NIGHTLY 90 tablet 2    carvedilol (COREG) 12.5 MG tablet Take 1 tablet by mouth 2 times daily (with meals) 180 tablet 2    lisinopril (PRINIVIL;ZESTRIL) 2.5 MG tablet Take 1 tablet by mouth daily 180 tablet 2    Glycerin, Laxative, (GLYCERIN ADULT) 2.1 g suppository Place 1 suppository rectally as needed (constipation) 5 suppository 0    polyethylene glycol (GLYCOLAX) 17 g packet Take 17 g by mouth daily      blood glucose test strips (ASCENSIA AUTODISC VI;ONE TOUCH ULTRA TEST VI) strip 1 each by In Vitro route daily As needed. 100 each 1    Lancets MISC Test sugars once daily. R73.03 100 each 1     No current facility-administered medications for this visit. Allergies   Allergen Reactions    Atorvastatin      Other reaction(s): Muscle Pain    Codeine     Diltiazem Hcl Hives    Levofloxacin      Other reaction(s): Intolerance-unknown    Pregabalin      lyrica    Simvastatin      Other reaction(s): Muscle Pain       All patients pastmedical, surgical, social and family history has been reviewed. Subjective:      Review of Systems   Constitutional: Negative for activity change, appetite change, fatigue and fever. Eyes: Positive for discharge and redness. Lump near right eye   Respiratory: Negative for cough, shortness of breath and wheezing. Cardiovascular: Negative for chest pain and palpitations. Objective:      Physical Exam  Vitals and nursing note reviewed. Constitutional:       Appearance: Normal appearance. HENT:      Head: Normocephalic and atraumatic. Eyes:     Neurological:      Mental Status: She is alert. Assessment:       Diagnosis Orders   1. Obstruction of right tear duct         Plan:      Continue the antibiotic until gone. Warm compresses to affected area  Will call ophthalmology on Monday to see if can move patient's appt up  Pt to return if symptoms do not improve or worsen   Return PRN   No follow-ups on file. No orders of the defined types were placed in this encounter. No orders of the defined types were placed in this encounter. Patient given educational materials - see patient instructions. All patient questionsanswered. Pt voiced understanding. Reviewed health maintenance.      Electronically signed by KYLEE Wheeler - CNP, CNP on 8/13/2021 at 6:38 PM

## 2021-08-31 ENCOUNTER — OFFICE VISIT (OUTPATIENT)
Dept: FAMILY MEDICINE CLINIC | Age: 86
End: 2021-08-31
Payer: MEDICARE

## 2021-08-31 ENCOUNTER — HOSPITAL ENCOUNTER (OUTPATIENT)
Dept: LAB | Age: 86
Discharge: HOME | End: 2021-08-31
Payer: MEDICARE

## 2021-08-31 ENCOUNTER — TELEPHONE (OUTPATIENT)
Dept: FAMILY MEDICINE CLINIC | Age: 86
End: 2021-08-31

## 2021-08-31 VITALS
TEMPERATURE: 96.6 F | HEIGHT: 61 IN | SYSTOLIC BLOOD PRESSURE: 130 MMHG | DIASTOLIC BLOOD PRESSURE: 62 MMHG | HEART RATE: 65 BPM | BODY MASS INDEX: 16.92 KG/M2 | WEIGHT: 89.6 LBS | OXYGEN SATURATION: 96 %

## 2021-08-31 DIAGNOSIS — D64.9 ANEMIA, UNSPECIFIED TYPE: ICD-10-CM

## 2021-08-31 DIAGNOSIS — G89.29 CHRONIC MIDLINE LOW BACK PAIN WITHOUT SCIATICA: ICD-10-CM

## 2021-08-31 DIAGNOSIS — I10 ESSENTIAL HYPERTENSION: Primary | ICD-10-CM

## 2021-08-31 DIAGNOSIS — I10 ESSENTIAL HYPERTENSION: ICD-10-CM

## 2021-08-31 DIAGNOSIS — R73.03 PREDIABETES: Primary | ICD-10-CM

## 2021-08-31 DIAGNOSIS — J41.8 MIXED SIMPLE AND MUCOPURULENT CHRONIC BRONCHITIS (HCC): ICD-10-CM

## 2021-08-31 DIAGNOSIS — M54.50 CHRONIC MIDLINE LOW BACK PAIN WITHOUT SCIATICA: ICD-10-CM

## 2021-08-31 LAB
ABSOLUTE EOS #: 0.05 K/UL (ref 0–0.44)
ABSOLUTE IMMATURE GRANULOCYTE: 0.03 K/UL (ref 0–0.3)
ABSOLUTE LYMPH #: 1.59 K/UL (ref 1.1–3.7)
ABSOLUTE MONO #: 0.73 K/UL (ref 0.1–1.2)
ANION GAP SERPL CALCULATED.3IONS-SCNC: 12 MMOL/L (ref 9–17)
BASOPHILS # BLD: 1 % (ref 0–2)
BASOPHILS ABSOLUTE: 0.04 K/UL (ref 0–0.2)
BUN BLDV-MCNC: 16 MG/DL (ref 8–23)
BUN/CREAT BLD: 21 (ref 9–20)
CALCIUM SERPL-MCNC: 9.1 MG/DL (ref 8.6–10.4)
CHLORIDE BLD-SCNC: 98 MMOL/L (ref 98–107)
CO2: 26 MMOL/L (ref 20–31)
CREAT SERPL-MCNC: 0.76 MG/DL (ref 0.5–0.9)
DIFFERENTIAL TYPE: ABNORMAL
EOSINOPHILS RELATIVE PERCENT: 1 % (ref 1–4)
GFR AFRICAN AMERICAN: >60 ML/MIN
GFR NON-AFRICAN AMERICAN: >60 ML/MIN
GFR SERPL CREATININE-BSD FRML MDRD: ABNORMAL ML/MIN/{1.73_M2}
GFR SERPL CREATININE-BSD FRML MDRD: ABNORMAL ML/MIN/{1.73_M2}
GLUCOSE BLD-MCNC: 149 MG/DL (ref 70–99)
HCT VFR BLD CALC: 40.3 % (ref 36.3–47.1)
HEMOGLOBIN: 12.3 G/DL (ref 11.9–15.1)
IMMATURE GRANULOCYTES: 0 %
LYMPHOCYTES # BLD: 19 % (ref 24–43)
MCH RBC QN AUTO: 29.9 PG (ref 25.2–33.5)
MCHC RBC AUTO-ENTMCNC: 30.5 G/DL (ref 25.2–33.5)
MCV RBC AUTO: 97.8 FL (ref 82.6–102.9)
MONOCYTES # BLD: 9 % (ref 3–12)
NRBC AUTOMATED: 0 PER 100 WBC
PDW BLD-RTO: 13.8 % (ref 11.8–14.4)
PLATELET # BLD: 292 K/UL (ref 138–453)
PLATELET ESTIMATE: ABNORMAL
PMV BLD AUTO: 10.7 FL (ref 8.1–13.5)
POTASSIUM SERPL-SCNC: 4.5 MMOL/L (ref 3.7–5.3)
RBC # BLD: 4.12 M/UL (ref 3.95–5.11)
RBC # BLD: ABNORMAL 10*6/UL
SEG NEUTROPHILS: 70 % (ref 36–65)
SEGMENTED NEUTROPHILS ABSOLUTE COUNT: 6 K/UL (ref 1.5–8.1)
SODIUM BLD-SCNC: 136 MMOL/L (ref 135–144)
WBC # BLD: 8.4 K/UL (ref 3.5–11.3)
WBC # BLD: ABNORMAL 10*3/UL

## 2021-08-31 PROCEDURE — G8427 DOCREV CUR MEDS BY ELIG CLIN: HCPCS | Performed by: FAMILY MEDICINE

## 2021-08-31 PROCEDURE — G8926 SPIRO NO PERF OR DOC: HCPCS | Performed by: FAMILY MEDICINE

## 2021-08-31 PROCEDURE — 99213 OFFICE O/P EST LOW 20 MIN: CPT | Performed by: FAMILY MEDICINE

## 2021-08-31 PROCEDURE — 1123F ACP DISCUSS/DSCN MKR DOCD: CPT | Performed by: FAMILY MEDICINE

## 2021-08-31 PROCEDURE — 85025 COMPLETE CBC W/AUTO DIFF WBC: CPT

## 2021-08-31 PROCEDURE — 99214 OFFICE O/P EST MOD 30 MIN: CPT | Performed by: FAMILY MEDICINE

## 2021-08-31 PROCEDURE — G8419 CALC BMI OUT NRM PARAM NOF/U: HCPCS | Performed by: FAMILY MEDICINE

## 2021-08-31 PROCEDURE — 80048 BASIC METABOLIC PNL TOTAL CA: CPT

## 2021-08-31 PROCEDURE — 1090F PRES/ABSN URINE INCON ASSESS: CPT | Performed by: FAMILY MEDICINE

## 2021-08-31 PROCEDURE — 36415 COLL VENOUS BLD VENIPUNCTURE: CPT

## 2021-08-31 PROCEDURE — 4040F PNEUMOC VAC/ADMIN/RCVD: CPT | Performed by: FAMILY MEDICINE

## 2021-08-31 PROCEDURE — 1036F TOBACCO NON-USER: CPT | Performed by: FAMILY MEDICINE

## 2021-08-31 PROCEDURE — 3023F SPIROM DOC REV: CPT | Performed by: FAMILY MEDICINE

## 2021-08-31 RX ORDER — OXYCODONE AND ACETAMINOPHEN 7.5; 325 MG/1; MG/1
1 TABLET ORAL EVERY 6 HOURS PRN
Qty: 120 TABLET | Refills: 0 | Status: SHIPPED | OUTPATIENT
Start: 2021-08-31 | End: 2021-08-31 | Stop reason: SDUPTHER

## 2021-08-31 RX ORDER — BLOOD-GLUCOSE METER
1 KIT MISCELLANEOUS DAILY
Qty: 1 KIT | Refills: 0 | Status: SHIPPED | OUTPATIENT
Start: 2021-08-31

## 2021-08-31 RX ORDER — OXYCODONE AND ACETAMINOPHEN 7.5; 325 MG/1; MG/1
1 TABLET ORAL EVERY 6 HOURS PRN
Qty: 120 TABLET | Refills: 0 | Status: SHIPPED | OUTPATIENT
Start: 2021-08-31 | End: 2021-10-05 | Stop reason: SDUPTHER

## 2021-08-31 RX ORDER — HYDRALAZINE HYDROCHLORIDE 25 MG/1
12.5 TABLET, FILM COATED ORAL 2 TIMES DAILY
Qty: 90 TABLET | Refills: 1 | Status: SHIPPED | OUTPATIENT
Start: 2021-08-31 | End: 2021-10-21

## 2021-08-31 RX ORDER — PNV NO.95/FERROUS FUM/FOLIC AC 28MG-0.8MG
TABLET ORAL
Qty: 90 TABLET | Refills: 3 | Status: SHIPPED | OUTPATIENT
Start: 2021-08-31 | End: 2022-01-20 | Stop reason: SDUPTHER

## 2021-08-31 RX ORDER — LANCETS 30 GAUGE
1 EACH MISCELLANEOUS DAILY
Qty: 100 EACH | Refills: 1 | Status: SHIPPED | OUTPATIENT
Start: 2021-08-31

## 2021-08-31 RX ORDER — GLUCOSAMINE HCL/CHONDROITIN SU 500-400 MG
CAPSULE ORAL
Qty: 100 STRIP | Refills: 0 | Status: SHIPPED | OUTPATIENT
Start: 2021-08-31

## 2021-08-31 RX ORDER — BUSPIRONE HYDROCHLORIDE 7.5 MG/1
7.5 TABLET ORAL 3 TIMES DAILY PRN
Qty: 90 TABLET | Refills: 3 | Status: SHIPPED | OUTPATIENT
Start: 2021-08-31 | End: 2022-04-27

## 2021-08-31 RX ORDER — AMLODIPINE BESYLATE 5 MG/1
5 TABLET ORAL 2 TIMES DAILY
Qty: 180 TABLET | Refills: 1 | Status: SHIPPED | OUTPATIENT
Start: 2021-08-31 | End: 2021-10-21

## 2021-08-31 RX ORDER — ISOSORBIDE MONONITRATE 30 MG/1
30 TABLET, EXTENDED RELEASE ORAL DAILY
Qty: 90 TABLET | Refills: 1 | Status: SHIPPED | OUTPATIENT
Start: 2021-08-31 | End: 2022-03-30

## 2021-08-31 RX ORDER — BLOOD-GLUCOSE METER
1 KIT MISCELLANEOUS DAILY
Qty: 1 KIT | Refills: 0 | Status: SHIPPED | OUTPATIENT
Start: 2021-08-31 | End: 2021-08-31

## 2021-08-31 RX ORDER — TOBRAMYCIN / DEXAMETHASONE 3; .5 MG/ML; MG/ML
SUSPENSION/ DROPS OPHTHALMIC
COMMUNITY
Start: 2021-08-16

## 2021-08-31 RX ORDER — AZITHROMYCIN 250 MG/1
TABLET, FILM COATED ORAL
COMMUNITY
Start: 2021-08-26 | End: 2021-12-07

## 2021-08-31 ASSESSMENT — ENCOUNTER SYMPTOMS
SHORTNESS OF BREATH: 1
BACK PAIN: 1
COUGH: 0
WHEEZING: 0
CHEST TIGHTNESS: 0

## 2021-08-31 NOTE — PROGRESS NOTES
ASIYA Rodriguez [address]  [address] [address]  Dept: [phone]  Dept Fax: [phone]  Loc: [phone]    Jhonathan Zimmer is a 80 y.o. female who presents today for her medical conditions/complaints as noted below. Jhonathan Zimmer is c/o of   Chief Complaint   Patient presents with    3 Month Follow-Up     back pain    Other     questions portable 02       HPI:     HPI Here today for a follow up of her back pain and she is worried about her oxygen level. Her O2 level has been in the low 90s (94%) this morning and she put some O2 on at 2L and it went up to 97%. She is coughing some. She is feeling a little wheezy sometimes. She does not typically lay down. She is not getting much exercise. She is sleeping pretty well. She is not having any issues with chest pain. Back pain: stable; her pain is about the same. She gets relief from the percocet. She is having some constipation but the prune juice and miralax work well together to fix it. She takes her percocet regularly. She has not had any more issues with confusion. She feels like her sodium must be better.      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     L4 & L5    Cancer (HCC)     skin cancers    Depression     History of heart artery stent     Hyperlipidemia     Hypertension     Nerve pain           Social History     Tobacco Use    Smoking status: Never Smoker    Smokeless tobacco: Never Used   Substance Use Topics    Alcohol use: No     Current Outpatient Medications   Medication Sig Dispense Refill    azithromycin (ZITHROMAX) 250 MG tablet TAKE 2 TABLETS BY MOUTH ON DAY 1 AND THEN TAKE 1 TABLET BY MOUTH ONCE A DAY ON DAY 2 THROUGH DAY 5      TOBRADEX ST 0.3-0.05 % SUSP INSTILL 1 DROP INTO RIGHT EYE THREE TIMES DAILY      Ferrous Sulfate (IRON) 325 (65 Fe) MG TABS TAKE 1 TABLET BY MOUTH THREE TIMES DAILY 90 tablet 3    busPIRone (BUSPAR) 7.5 MG tablet Take 1 tablet by mouth 3 times daily as needed (anxiety) 90 tablet 3    hydrALAZINE (APRESOLINE) 25 MG tablet Take 0.5 tablets by mouth 2 times daily 90 tablet 1    isosorbide mononitrate (IMDUR) 30 MG extended release tablet Take 1 tablet by mouth daily 90 tablet 1    amLODIPine (NORVASC) 5 MG tablet Take 1 tablet by mouth 2 times daily 180 tablet 1    oxyCODONE-acetaminophen (PERCOCET) 7.5-325 MG per tablet Take 1 tablet by mouth every 6 hours as needed for Pain for up to 30 days. Intended supply: 30 days 120 tablet 0    oxyCODONE-acetaminophen (PERCOCET) 7.5-325 MG per tablet Take 1 tablet by mouth every 6 hours as needed for Pain for up to 30 days. Intended supply: 30 days 120 tablet 0    gabapentin (NEURONTIN) 300 MG capsule Take 2 capsules by mouth twice daily 120 capsule 2    Handicap Placard MISC by Does not apply route Exp 3/1/2026 1 each 0    rOPINIRole (REQUIP) 0.25 MG tablet TAKE 1 TABLET BY MOUTH NIGHTLY 90 tablet 2    carvedilol (COREG) 12.5 MG tablet Take 1 tablet by mouth 2 times daily (with meals) 180 tablet 2    lisinopril (PRINIVIL;ZESTRIL) 2.5 MG tablet Take 1 tablet by mouth daily 180 tablet 2    Glycerin, Laxative, (GLYCERIN ADULT) 2.1 g suppository Place 1 suppository rectally as needed (constipation) 5 suppository 0    polyethylene glycol (GLYCOLAX) 17 g packet Take 17 g by mouth daily      glucose monitoring (FREESTYLE FREEDOM) kit 1 kit by Does not apply route daily 1 kit 0    blood glucose monitor strips Test ONCE times day & as needed for symptoms of irregular blood glucose. Dispense sufficient amount for indicated testing frequency plus additional to accommodate PRN testing needs. 100 strip 0    Lancets MISC 1 each by Does not apply route daily 100 each 1     No current facility-administered medications for this visit.           Allergies   Allergen Reactions    Atorvastatin      Other reaction(s): Muscle Pain    Codeine     Diltiazem Hcl Hives    Levofloxacin      Other reaction(s): Intolerance-unknown    Pregabalin      lyrica    Simvastatin      Other reaction(s): Muscle Pain       Subjective:     Review of Systems   Constitutional: Negative for activity change, appetite change, chills, fatigue and fever. Eyes: Negative for visual disturbance. Respiratory: Positive for shortness of breath. Negative for cough, chest tightness and wheezing. Cardiovascular: Negative for chest pain, palpitations and leg swelling. Genitourinary: Negative for difficulty urinating. Musculoskeletal: Positive for back pain. Neurological: Negative for dizziness, syncope, weakness, light-headedness and headaches. Psychiatric/Behavioral: Negative for decreased concentration, dysphoric mood and sleep disturbance. The patient is not nervous/anxious. Objective:      Physical Exam  Vitals and nursing note reviewed. Constitutional:       General: She is not in acute distress. Appearance: She is well-developed. Eyes:      Conjunctiva/sclera: Conjunctivae normal.   Neck:      Thyroid: No thyromegaly. Cardiovascular:      Rate and Rhythm: Normal rate and regular rhythm. Heart sounds: Normal heart sounds. No murmur heard. Pulmonary:      Effort: Pulmonary effort is normal. No respiratory distress. Breath sounds: Normal breath sounds. No wheezing. Musculoskeletal:      Cervical back: Normal range of motion and neck supple. Lymphadenopathy:      Cervical: No cervical adenopathy. Skin:     General: Skin is warm and dry. Findings: No erythema or rash. Neurological:      Mental Status: She is alert and oriented to person, place, and time. Psychiatric:         Mood and Affect: Mood normal.         Behavior: Behavior normal.         Thought Content:  Thought content normal.         Judgment: Judgment normal.       /62   Pulse 65   Temp 96.6 °F (35.9 °C)   Ht 5' 1\" (1.549 m)   Wt 89 lb 9.6 oz (40.6 kg)   SpO2 96%   BMI 16.93 kg/m²     Assessment:       Diagnosis Orders   1. Essential hypertension  hydrALAZINE (APRESOLINE) 25 MG tablet    Basic Metabolic Panel   2. Mixed simple and mucopurulent chronic bronchitis ([address]     3. Anemia, unspecified type  Ferrous Sulfate (IRON) 325 (65 Fe) MG TABS   4. Chronic midline low back pain without sciatica  oxyCODONE-acetaminophen (PERCOCET) 7.5-325 MG per tablet    DISCONTINUED: oxyCODONE-acetaminophen (PERCOCET) 7.5-325 MG per tablet    DISCONTINUED: oxyCODONE-acetaminophen (PERCOCET) 7.5-325 MG per tablet             Plan:        HTN: stable; her blood pressure is well controlled. I did order a BMP to make sure her creatinine and sodium are normal.     COPD: stable; she has not had any recent issues with cough or sputum production. A walking pulse ox was done to see if she qualifies for portable O2 and her O2 never dropped below 94% so she did not qualify. Anemia: stable; her last hgb was good so I will check today to make sure it is not doing down. Back pain: stable; she is doing well on the percocet. I will continue her current dose. She updated her pain contract today. Controlled Substance Monitoring:    Acute and Chronic Pain Monitoring:   RX Monitoring 8/31/2021   Attestation -   Periodic Controlled Substance Monitoring Possible medication side effects, risk of tolerance/dependence & alternative treatments discussed. ;No signs of potential drug abuse or diversion identified. ;Assessed functional status. ;Obtaining appropriate analgesic effect of treatment. Chronic Pain > 50 MEDD -   Chronic Pain > 80 MEDD Obtained or confirmed \"Medication Contract\" on file. ;Naloxone script offered, but declined by patient. Return in about 3 months (around 11/30/2021) for [address]     Orders Placed This Encounter   Procedures    Basic Metabolic Panel     Standing Status:   Future     Number of Occurrences:   1     Standing Expiration Date:   2022     Orders Placed This Encounter   Medications    Ferrous Sulfate (IRON) 325 (65 Fe) MG TABS     Sig: TAKE 1 TABLET BY MOUTH THREE TIMES DAILY     Dispense:  90 tablet     Refill:  3    busPIRone (BUSPAR) 7.5 MG tablet     Sig: Take 1 tablet by mouth 3 times daily as needed (anxiety)     Dispense:  90 tablet     Refill:  3    hydrALAZINE (APRESOLINE) 25 MG tablet     Sig: Take 0.5 tablets by mouth 2 times daily     Dispense:  90 tablet     Refill:  1    isosorbide mononitrate (IMDUR) 30 MG extended release tablet     Sig: Take 1 tablet by mouth daily     Dispense:  90 tablet     Refill:  1    amLODIPine (NORVASC) 5 MG tablet     Sig: Take 1 tablet by mouth 2 times daily     Dispense:  180 tablet     Refill:  1    DISCONTD: glucose monitoring (FREESTYLE FREEDOM) kit     Si kit by Does not apply route daily     Dispense:  1 kit     Refill:  0     Please dispense which insurance will cover    DISCONTD: oxyCODONE-acetaminophen (PERCOCET) 7.5-325 MG per tablet     Sig: Take 1 tablet by mouth every 6 hours as needed for Pain for up to 30 days. Intended supply: 30 days     Dispense:  120 tablet     Refill:  0     Do not fill until 21    DISCONTD: oxyCODONE-acetaminophen (PERCOCET) 7.5-325 MG per tablet     Sig: Take 1 tablet by mouth every 6 hours as needed for Pain for up to 30 days. Intended supply: 30 days     Dispense:  120 tablet     Refill:  0     Do not fill until 10/4/21    oxyCODONE-acetaminophen (PERCOCET) 7.5-325 MG per tablet     Sig: Take 1 tablet by mouth every 6 hours as needed for Pain for up to 30 days. Intended supply: 30 days     Dispense:  120 tablet     Refill:  0     Do not fill until 11/3/21       Patientgiven educational materials - see patient instructions. Discussed use, benefit,and side effects of prescribed medications. All patient questions answered. Ptvoiced understanding. Reviewed health maintenance.   Instructed to continue currentmedications, diet and exercise. Patient agreed with treatment plan. Follow up asdirected.      Electronically signed by Perfecto Aguilera MD on 8/31/2021 at 5:59 PM

## 2021-10-05 DIAGNOSIS — G89.29 CHRONIC MIDLINE LOW BACK PAIN WITHOUT SCIATICA: ICD-10-CM

## 2021-10-05 DIAGNOSIS — M54.50 CHRONIC MIDLINE LOW BACK PAIN WITHOUT SCIATICA: ICD-10-CM

## 2021-10-05 RX ORDER — OXYCODONE AND ACETAMINOPHEN 7.5; 325 MG/1; MG/1
1 TABLET ORAL EVERY 6 HOURS PRN
Qty: 120 TABLET | Refills: 0 | Status: SHIPPED | OUTPATIENT
Start: 2021-10-05 | End: 2021-11-04 | Stop reason: SDUPTHER

## 2021-10-05 NOTE — TELEPHONE ENCOUNTER
Whitney Beavers called requesting a refill of the below medication which has been pended for you:     Requested Prescriptions     Pending Prescriptions Disp Refills    oxyCODONE-acetaminophen (PERCOCET) 7.5-325 MG per tablet 120 tablet 0     Sig: Take 1 tablet by mouth every 6 hours as needed for Pain for up to 30 days. Intended supply: 30 days       Last Appointment Date: 8/31/2021  Next Appointment Date: 12/7/2021    Allergies   Allergen Reactions    Atorvastatin      Other reaction(s): Muscle Pain    Codeine     Diltiazem Hcl Hives    Levofloxacin      Other reaction(s):  Intolerance-unknown    Pregabalin      lyrica    Simvastatin      Other reaction(s): Muscle Pain   Per [facility], last filled 9/5/2021, #120/30 days

## 2021-10-21 DIAGNOSIS — I10 ESSENTIAL HYPERTENSION: ICD-10-CM

## 2021-10-21 NOTE — TELEPHONE ENCOUNTER
Pt has enough to wait. Violet Crowell called requesting a refill of the below medication which has been pended for you:     Requested Prescriptions     Pending Prescriptions Disp Refills    amLODIPine (NORVASC) 5 MG tablet [Pharmacy Med Name: amLODIPine Besylate 5 MG Oral Tablet] 180 tablet 1     Sig: Take 1 tablet by mouth twice daily    hydrALAZINE (APRESOLINE) 25 MG tablet [Pharmacy Med Name: hydrALAZINE HCl 25 MG Oral Tablet] 90 tablet 1     Sig: Take 1/2 (one-half) tablet by mouth twice daily    carvedilol (COREG) 12.5 MG tablet [Pharmacy Med Name: Carvedilol 12.5 MG Oral Tablet] 180 tablet 1     Sig: TAKE 1 TABLET BY MOUTH TWICE DAILY WITH MEALS       Last Appointment Date: 8/31/2021  Next Appointment Date: 12/7/2021    Allergies   Allergen Reactions    Atorvastatin      Other reaction(s): Muscle Pain    Codeine     Diltiazem Hcl Hives    Levofloxacin      Other reaction(s):  Intolerance-unknown    Pregabalin      lyrica    Simvastatin      Other reaction(s): Muscle Pain

## 2021-10-23 RX ORDER — AMLODIPINE BESYLATE 5 MG/1
TABLET ORAL
Qty: 180 TABLET | Refills: 1 | Status: SHIPPED | OUTPATIENT
Start: 2021-10-23 | End: 2022-03-29

## 2021-10-23 RX ORDER — CARVEDILOL 12.5 MG/1
TABLET ORAL
Qty: 180 TABLET | Refills: 1 | Status: SHIPPED | OUTPATIENT
Start: 2021-10-23 | End: 2022-03-29

## 2021-10-23 RX ORDER — HYDRALAZINE HYDROCHLORIDE 25 MG/1
TABLET, FILM COATED ORAL
Qty: 90 TABLET | Refills: 1 | Status: SHIPPED | OUTPATIENT
Start: 2021-10-23 | End: 2022-03-29

## 2021-11-03 RX ORDER — ROPINIROLE 0.25 MG/1
TABLET, FILM COATED ORAL
Qty: 90 TABLET | Refills: 1 | Status: SHIPPED | OUTPATIENT
Start: 2021-11-03 | End: 2022-04-29

## 2021-11-03 NOTE — TELEPHONE ENCOUNTER
Kennedy Pradhan called requesting a refill of the below medication which has been pended for you:     Requested Prescriptions     Pending Prescriptions Disp Refills    rOPINIRole (REQUIP) 0.25 MG tablet 90 tablet 2     Sig: TAKE 1 TABLET BY MOUTH NIGHTLY       Last Appointment Date: 8/31/2021  Next Appointment Date: 12/7/2021    Allergies   Allergen Reactions    Atorvastatin      Other reaction(s): Muscle Pain    Codeine     Diltiazem Hcl Hives    Levofloxacin      Other reaction(s):  Intolerance-unknown    Pregabalin      lyrica    Simvastatin      Other reaction(s): Muscle Pain

## 2021-11-04 DIAGNOSIS — G89.29 CHRONIC MIDLINE LOW BACK PAIN WITHOUT SCIATICA: ICD-10-CM

## 2021-11-04 DIAGNOSIS — M54.50 CHRONIC MIDLINE LOW BACK PAIN WITHOUT SCIATICA: ICD-10-CM

## 2021-11-04 RX ORDER — OXYCODONE AND ACETAMINOPHEN 7.5; 325 MG/1; MG/1
1 TABLET ORAL EVERY 6 HOURS PRN
Qty: 120 TABLET | Refills: 0 | Status: SHIPPED | OUTPATIENT
Start: 2021-11-04 | End: 2021-12-03 | Stop reason: SDUPTHER

## 2021-11-04 NOTE — TELEPHONE ENCOUNTER
Per [facility], last filled 10/5/2021, #120/30 days        Pauly Chavira called requesting a refill of the below medication which has been pended for you:     Requested Prescriptions     Pending Prescriptions Disp Refills    oxyCODONE-acetaminophen (PERCOCET) 7.5-325 MG per tablet 120 tablet 0     Sig: Take 1 tablet by mouth every 6 hours as needed for Pain for up to 30 days. Intended supply: 30 days       Last Appointment Date: 8/31/2021  Next Appointment Date: 12/7/2021    Allergies   Allergen Reactions    Atorvastatin      Other reaction(s): Muscle Pain    Codeine     Diltiazem Hcl Hives    Levofloxacin      Other reaction(s):  Intolerance-unknown    Pregabalin      lyrica    Simvastatin      Other reaction(s): Muscle Pain

## 2021-12-07 ENCOUNTER — OFFICE VISIT (OUTPATIENT)
Dept: FAMILY MEDICINE CLINIC | Age: 86
End: 2021-12-07
Payer: MEDICARE

## 2021-12-07 ENCOUNTER — HOSPITAL ENCOUNTER (OUTPATIENT)
Dept: LAB | Age: 86
Discharge: HOME | End: 2021-12-07
Payer: MEDICARE

## 2021-12-07 VITALS
WEIGHT: 93.8 LBS | TEMPERATURE: 97.2 F | OXYGEN SATURATION: 95 % | HEART RATE: 70 BPM | DIASTOLIC BLOOD PRESSURE: 70 MMHG | BODY MASS INDEX: 17.71 KG/M2 | SYSTOLIC BLOOD PRESSURE: 138 MMHG | HEIGHT: 61 IN

## 2021-12-07 DIAGNOSIS — I10 ESSENTIAL HYPERTENSION: ICD-10-CM

## 2021-12-07 DIAGNOSIS — G89.29 CHRONIC MIDLINE LOW BACK PAIN WITHOUT SCIATICA: ICD-10-CM

## 2021-12-07 DIAGNOSIS — F11.99 OPIOID USE, UNSPECIFIED WITH UNSPECIFIED OPIOID-INDUCED DISORDER (HCC): ICD-10-CM

## 2021-12-07 DIAGNOSIS — M54.50 CHRONIC MIDLINE LOW BACK PAIN WITHOUT SCIATICA: ICD-10-CM

## 2021-12-07 DIAGNOSIS — Z00.00 ROUTINE GENERAL MEDICAL EXAMINATION AT A HEALTH CARE FACILITY: Primary | ICD-10-CM

## 2021-12-07 DIAGNOSIS — D64.9 ANEMIA, UNSPECIFIED TYPE: ICD-10-CM

## 2021-12-07 LAB
ABSOLUTE EOS #: 0.14 K/UL (ref 0–0.44)
ABSOLUTE IMMATURE GRANULOCYTE: 0.03 K/UL (ref 0–0.3)
ABSOLUTE LYMPH #: 1.75 K/UL (ref 1.1–3.7)
ABSOLUTE MONO #: 1 K/UL (ref 0.1–1.2)
BASOPHILS # BLD: 1 % (ref 0–2)
BASOPHILS ABSOLUTE: 0.04 K/UL (ref 0–0.2)
DIFFERENTIAL TYPE: ABNORMAL
EOSINOPHILS RELATIVE PERCENT: 2 % (ref 1–4)
FERRITIN: 232 UG/L (ref 13–150)
HCT VFR BLD CALC: 38 % (ref 36.3–47.1)
HEMOGLOBIN: 11.6 G/DL (ref 11.9–15.1)
IMMATURE GRANULOCYTES: 0 %
IRON SATURATION: 40 % (ref 20–55)
IRON: 81 UG/DL (ref 37–145)
LYMPHOCYTES # BLD: 21 % (ref 24–43)
MCH RBC QN AUTO: 30.3 PG (ref 25.2–33.5)
MCHC RBC AUTO-ENTMCNC: 30.5 G/DL (ref 25.2–33.5)
MCV RBC AUTO: 99.2 FL (ref 82.6–102.9)
MONOCYTES # BLD: 12 % (ref 3–12)
NRBC AUTOMATED: 0 PER 100 WBC
PDW BLD-RTO: 14.6 % (ref 11.8–14.4)
PLATELET # BLD: 251 K/UL (ref 138–453)
PLATELET ESTIMATE: ABNORMAL
PMV BLD AUTO: 10.4 FL (ref 8.1–13.5)
RBC # BLD: 3.83 M/UL (ref 3.95–5.11)
RBC # BLD: ABNORMAL 10*6/UL
SEG NEUTROPHILS: 64 % (ref 36–65)
SEGMENTED NEUTROPHILS ABSOLUTE COUNT: 5.28 K/UL (ref 1.5–8.1)
TOTAL IRON BINDING CAPACITY: 205 UG/DL (ref 250–450)
UNSATURATED IRON BINDING CAPACITY: 124 UG/DL (ref 112–347)
WBC # BLD: 8.2 K/UL (ref 3.5–11.3)
WBC # BLD: ABNORMAL 10*3/UL

## 2021-12-07 PROCEDURE — 83540 ASSAY OF IRON: CPT

## 2021-12-07 PROCEDURE — G8427 DOCREV CUR MEDS BY ELIG CLIN: HCPCS | Performed by: FAMILY MEDICINE

## 2021-12-07 PROCEDURE — 36415 COLL VENOUS BLD VENIPUNCTURE: CPT

## 2021-12-07 PROCEDURE — 99212 OFFICE O/P EST SF 10 MIN: CPT

## 2021-12-07 PROCEDURE — 85025 COMPLETE CBC W/AUTO DIFF WBC: CPT

## 2021-12-07 PROCEDURE — 1036F TOBACCO NON-USER: CPT | Performed by: FAMILY MEDICINE

## 2021-12-07 PROCEDURE — G8419 CALC BMI OUT NRM PARAM NOF/U: HCPCS | Performed by: FAMILY MEDICINE

## 2021-12-07 PROCEDURE — 83550 IRON BINDING TEST: CPT

## 2021-12-07 PROCEDURE — 99213 OFFICE O/P EST LOW 20 MIN: CPT | Performed by: FAMILY MEDICINE

## 2021-12-07 PROCEDURE — 4040F PNEUMOC VAC/ADMIN/RCVD: CPT | Performed by: FAMILY MEDICINE

## 2021-12-07 PROCEDURE — 1090F PRES/ABSN URINE INCON ASSESS: CPT | Performed by: FAMILY MEDICINE

## 2021-12-07 PROCEDURE — 82728 ASSAY OF FERRITIN: CPT

## 2021-12-07 PROCEDURE — G8484 FLU IMMUNIZE NO ADMIN: HCPCS | Performed by: FAMILY MEDICINE

## 2021-12-07 PROCEDURE — 1123F ACP DISCUSS/DSCN MKR DOCD: CPT | Performed by: FAMILY MEDICINE

## 2021-12-07 PROCEDURE — G0439 PPPS, SUBSEQ VISIT: HCPCS | Performed by: FAMILY MEDICINE

## 2021-12-07 RX ORDER — OXYCODONE AND ACETAMINOPHEN 7.5; 325 MG/1; MG/1
1 TABLET ORAL EVERY 6 HOURS PRN
Qty: 120 TABLET | Refills: 0 | Status: SHIPPED | OUTPATIENT
Start: 2021-12-07 | End: 2022-03-07 | Stop reason: SDUPTHER

## 2021-12-07 RX ORDER — OXYCODONE AND ACETAMINOPHEN 7.5; 325 MG/1; MG/1
1 TABLET ORAL EVERY 6 HOURS PRN
Qty: 120 TABLET | Refills: 0 | Status: SHIPPED | OUTPATIENT
Start: 2021-12-07 | End: 2021-12-07 | Stop reason: SDUPTHER

## 2021-12-07 SDOH — ECONOMIC STABILITY: FOOD INSECURITY: WITHIN THE PAST 12 MONTHS, THE FOOD YOU BOUGHT JUST DIDN'T LAST AND YOU DIDN'T HAVE MONEY TO GET MORE.: PATIENT DECLINED

## 2021-12-07 SDOH — ECONOMIC STABILITY: FOOD INSECURITY: WITHIN THE PAST 12 MONTHS, YOU WORRIED THAT YOUR FOOD WOULD RUN OUT BEFORE YOU GOT MONEY TO BUY MORE.: PATIENT DECLINED

## 2021-12-07 ASSESSMENT — ENCOUNTER SYMPTOMS
CHEST TIGHTNESS: 0
SHORTNESS OF BREATH: 0
COUGH: 0
WHEEZING: 0
BACK PAIN: 1

## 2021-12-07 ASSESSMENT — PATIENT HEALTH QUESTIONNAIRE - PHQ9
SUM OF ALL RESPONSES TO PHQ9 QUESTIONS 1 & 2: 0
SUM OF ALL RESPONSES TO PHQ QUESTIONS 1-9: 0
2. FEELING DOWN, DEPRESSED OR HOPELESS: 0
1. LITTLE INTEREST OR PLEASURE IN DOING THINGS: 0
SUM OF ALL RESPONSES TO PHQ QUESTIONS 1-9: 0
SUM OF ALL RESPONSES TO PHQ QUESTIONS 1-9: 0

## 2021-12-07 ASSESSMENT — LIFESTYLE VARIABLES: HOW OFTEN DO YOU HAVE A DRINK CONTAINING ALCOHOL: 0

## 2021-12-07 ASSESSMENT — SOCIAL DETERMINANTS OF HEALTH (SDOH): HOW HARD IS IT FOR YOU TO PAY FOR THE VERY BASICS LIKE FOOD, HOUSING, MEDICAL CARE, AND HEATING?: PATIENT DECLINED

## 2021-12-07 NOTE — PROGRESS NOTES
ASIYA [address]  [address] [address]  Dept: [phone]  Dept Fax: [phone]  Loc: [phone]    Christiano Lucero is a 80 y.o. female who presents today for her medical conditions/complaints as notedbelow. Christiano Lucero is c/o of   Chief Complaint   Patient presents with    Medicare AWV    3 Month Follow-Up     HTN       HPI:     HPI Here today for her [address] and a follow up of her knee pain and her HTN    Knee/back pain: worsening; she is still having a lot of pain in her knees and back. She is not getting much exercise. She is staying with her sister and does not have to do stairs. She gets really good pain relief from her percocet and she is not having any issues with side effects from it. HTN: stable; no issues with chest pain or shortness of breath. No issues with headaches. She has been having some blurry vision but not more than normal and she is due to see the eye doctor. She gets a little bit of swelling in her legs but mostly if she is sitting too long. 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  Intended supply: 30 days 120 tablet 0    rOPINIRole (REQUIP) 0.25 MG tablet TAKE 1 TABLET BY MOUTH NIGHTLY 90 tablet 1    amLODIPine (NORVASC) 5 MG tablet Take 1 tablet by mouth twice daily 180 tablet 1    hydrALAZINE (APRESOLINE) 25 MG tablet Take 1/2 (one-half) tablet by mouth twice daily 90 tablet 1    carvedilol (COREG) 12.5 MG tablet TAKE 1 TABLET BY MOUTH TWICE DAILY WITH MEALS 180 tablet 1    TOBRADEX ST 0.3-0.05 % SUSP INSTILL 1 DROP INTO RIGHT EYE THREE TIMES DAILY      Ferrous Sulfate (IRON) 325 (65 Fe) MG TABS TAKE 1 TABLET BY MOUTH THREE TIMES DAILY 90 tablet 3    busPIRone (BUSPAR) 7.5 MG tablet Take 1 tablet by mouth 3 times daily as needed (anxiety) 90 tablet 3    isosorbide mononitrate (IMDUR) 30 MG extended release tablet Take 1 tablet by mouth daily 90 tablet 1    glucose monitoring (FREESTYLE FREEDOM) kit 1 kit by Does not apply route daily 1 kit 0    blood glucose monitor strips Test ONCE times day & as needed for symptoms of irregular blood glucose. Dispense sufficient amount for indicated testing frequency plus additional to accommodate PRN testing needs. 100 strip 0    Lancets MISC 1 each by Does not apply route daily 100 each 1    Handicap Placard MISC by Does not apply route Exp 3/1/2026 1 each 0    lisinopril (PRINIVIL;ZESTRIL) 2.5 MG tablet Take 1 tablet by mouth daily 180 tablet 2    Glycerin, Laxative, (GLYCERIN ADULT) 2.1 g suppository Place 1 suppository rectally as needed (constipation) 5 suppository 0    polyethylene glycol (GLYCOLAX) 17 g packet Take 17 g by mouth daily      gabapentin (NEURONTIN) 300 MG capsule Take 2 capsules by mouth twice daily 120 capsule 2     No current facility-administered medications for this visit. Allergies   Allergen Reactions    Atorvastatin      Other reaction(s): Muscle Pain    Codeine     Diltiazem Hcl Hives    Levofloxacin      Other reaction(s): Intolerance-unknown    Pregabalin      lyrica    Simvastatin      Other reaction(s): Muscle Pain       Subjective:     Review of Systems   Constitutional: Negative for activity change, appetite change, chills, fatigue and fever. Eyes: Negative for visual disturbance.    Respiratory: Negative for cough, chest tightness, shortness of breath and wheezing. Cardiovascular: Negative for chest pain, palpitations and leg swelling. Genitourinary: Negative for difficulty urinating. Musculoskeletal: Positive for arthralgias (knee pain) and back pain. Negative for joint swelling. Neurological: Negative for dizziness, syncope, weakness, light-headedness and headaches. Psychiatric/Behavioral: Negative for decreased concentration, dysphoric mood and sleep disturbance. The patient is not nervous/anxious. Objective:      Physical Exam  Vitals and nursing note reviewed. Constitutional:       General: She is not in acute distress. Appearance: She is well-developed. Eyes:      Conjunctiva/sclera: Conjunctivae normal.   Neck:      Thyroid: No thyromegaly. Cardiovascular:      Rate and Rhythm: Normal rate and regular rhythm. Heart sounds: Normal heart sounds. No murmur heard. Pulmonary:      Effort: Pulmonary effort is normal. No respiratory distress. Breath sounds: Normal breath sounds. No wheezing. Musculoskeletal:      Cervical back: Normal range of motion and neck supple. Lymphadenopathy:      Cervical: No cervical adenopathy. Skin:     General: Skin is warm and dry. Findings: No erythema or rash. Neurological:      Mental Status: She is alert and oriented to person, place, and time. Psychiatric:         Mood and Affect: Mood normal.         Behavior: Behavior normal.         Thought Content: Thought content normal.         Judgment: Judgment normal.       /70   Pulse 70   Temp 97.2 °F (36.2 °C)   Ht 5' 1\" (1.549 m)   Wt 93 lb 12.8 oz (42.5 kg)   SpO2 95%   BMI 17.72 kg/m²     Assessment:       Diagnosis Orders   1. Routine general medical examination at a health care facility     2.  Chronic midline low back pain without sciatica  oxyCODONE-acetaminophen (PERCOCET) 7.5-325 MG per tablet    DISCONTINUED: oxyCODONE-acetaminophen (PERCOCET) 7.5-325 MG per tablet DISCONTINUED: oxyCODONE-acetaminophen (PERCOCET) 7.5-325 MG per tablet   3. Opioid use, unspecified with unspecified opioid-induced disorder     4. Essential hypertension               Plan:        MWV: see other note    Back pain: stable; she is doing well overall. She is getting good relief from the percocet. I advised her to try to be a little more active. She was given a 3 month's supply of her percocet. Controlled Substance Monitoring:    Acute and Chronic Pain Monitoring:   RX Monitoring 12/7/2021   Attestation -   Periodic Controlled Substance Monitoring Possible medication side effects, risk of tolerance/dependence & alternative treatments discussed. ;No signs of potential drug abuse or diversion identified. ;Assessed functional status. ;Obtaining appropriate analgesic effect of treatment. Chronic Pain > 50 MEDD -   Chronic Pain > 80 MEDD -       HTN: stable; her blood pressure is well controlled. Return in 3 months (on 3/7/2022) for HTN follow up, Knee pain follow up. Orders Placed This Encounter   Medications    DISCONTD: oxyCODONE-acetaminophen (PERCOCET) 7.5-325 MG per tablet     Sig: Take 1 tablet by mouth every 6 hours as needed for Pain for up to 30 days. Intended supply: 30 days     Dispense:  120 tablet     Refill:  0     Do not fill until 1/6/22    DISCONTD: oxyCODONE-acetaminophen (PERCOCET) 7.5-325 MG per tablet     Sig: Take 1 tablet by mouth every 6 hours as needed for Pain for up to 30 days. Intended supply: 30 days     Dispense:  120 tablet     Refill:  0     Do not fill until 2/5/22    oxyCODONE-acetaminophen (PERCOCET) 7.5-325 MG per tablet     Sig: Take 1 tablet by mouth every 6 hours as needed for Pain for up to 30 days. Intended supply: 30 days     Dispense:  120 tablet     Refill:  0     Do not fill 3/3/22       Patientgiven educational materials - see patient instructions. Discussed use, benefit,and side effects of prescribed medications.   All patient questions answered. Ptvoiced understanding. Reviewed health maintenance. Instructed to continue currentmedications, diet and exercise. Patient agreed with treatment plan. Follow up asdirected.      Electronically signed by Pamella Gonzalez MD on 12/7/2021 at 12:37 PM

## 2021-12-07 NOTE — PROGRESS NOTES
Medicare Annual Wellness Visit  Name: Messi Gresham Date: 2021   MRN: [id number] Sex: Female   Age: 80 y.o. Ethnicity: Non- / Non    : 1928 Race: White (non-)      Frederick Parra is here for Medicare AWV and 3 Month Follow-Up (HTN)    Screenings for behavioral, psychosocial and functional/safety risks, and cognitive dysfunction are all negative except as indicated below. These results, as well as other patient data from the [address] form, are documented in Flowsheets linked to this Encounter. Allergies   Allergen Reactions    Atorvastatin      Other reaction(s): Muscle Pain    Codeine     Diltiazem Hcl Hives    Levofloxacin      Other reaction(s): Intolerance-unknown    Pregabalin      lyrica    Simvastatin      Other reaction(s): Muscle Pain         Prior to Visit Medications    Medication Sig Taking? Authorizing Provider   oxyCODONE-acetaminophen (PERCOCET) 7.5-325 MG per tablet Take 1 tablet by mouth every 6 hours as needed for Pain for up to 30 days.  Intended supply: 30 days Yes Gilson Mckeon MD   rOPINIRole (REQUIP) 0.25 MG tablet TAKE 1 TABLET BY MOUTH NIGHTLY Yes Gilson Mckeon MD   amLODIPine (NORVASC) 5 MG tablet Take 1 tablet by mouth twice daily Yes Gilson Mckeon MD   hydrALAZINE (APRESOLINE) 25 MG tablet Take 1/2 (one-half) tablet by mouth twice daily Yes Gilson Mckeon MD   carvedilol (COREG) 12.5 MG tablet TAKE 1 TABLET BY MOUTH TWICE DAILY WITH MEALS Yes Gilson Mckeon MD   TOBRADEX ST 0.3-0.05 % SUSP INSTILL 1 DROP INTO RIGHT EYE THREE TIMES DAILY Yes Historical Provider, MD   Ferrous Sulfate (IRON) 325 (65 Fe) MG TABS TAKE 1 TABLET BY MOUTH THREE TIMES DAILY Yes Gilson Mckeon MD   busPIRone (BUSPAR) 7.5 MG tablet Take 1 tablet by mouth 3 times daily as needed (anxiety) Yes Gilson Mckeon MD   isosorbide mononitrate (IMDUR) 30 MG extended release tablet Take 1 tablet by mouth daily Yes Gilson Mckeon MD   glucose monitoring (FREESTYLE FREEDOM) kit 1 kit by Does not apply route daily Yes Pat Stephens MD   blood glucose monitor strips Test ONCE times day & as needed for symptoms of irregular blood glucose. Dispense sufficient amount for indicated testing frequency plus additional to accommodate PRN testing needs.  Yes Pat Stephens MD   Lancets MISC 1 each by Does not apply route daily Yes Pat Stephens MD   Handicap Placard MISC by Does not apply route Exp 3/1/2026 Yes Pat Stephens MD   lisinopril (PRINIVIL;ZESTRIL) 2.5 MG tablet Take 1 tablet by mouth daily Yes Pat Stephens MD   Glycerin, Laxative, (GLYCERIN ADULT) 2.1 g suppository Place 1 suppository rectally as needed (constipation) Yes Pat Stephens MD   polyethylene glycol (GLYCOLAX) 17 g packet Take 17 g by mouth daily Yes Karina Provider, MD   gabapentin (NEURONTIN) 300 MG capsule Take 2 capsules by mouth twice daily  Pat Stephens MD         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     L4 & L5    Cancer (HCC)     skin cancers    Depression     History of heart artery stent     Hyperlipidemia     Hypertension     Nerve pain        Past Surgical History:   Procedure Laterality Date   [address]  04/2002    with bare metal RCA stent placement    CATARACT REMOVAL WITH IMPLANT Bilateral     2005 and 1998; lens implant [address] [facility]  2012    lumbar surgery on L4 and L5          Family History   Problem Relation Age of Onset    Hypertension Mother     Stroke Father     Hypertension Father     Heart Disease Natural Sibling     Diabetes Natural Sibling     Cancer Natural Sibling        CareTeam (Including outside providers/suppliers regularly involved in providing care):   Patient Care Team:  Pat Stephens MD as PCP - General (Family Medicine)  Pat Stephens MD as PCP - [facility] Empaneled Provider  Laura Trevino RN as Care Transitions Nurse  Zoraida Frank MD as Consulting Physician (Nephrology)    Wt Readings from Last 3 Encounters:   12/07/21 93 lb 12.8 oz (42.5 kg)   08/31/21 89 lb 9.6 oz (40.6 kg)   08/13/21 91 lb 3.2 oz (41.4 kg)     Vitals:    12/07/21 0801   BP: 138/70   Pulse: 70   Temp: 97.2 °F (36.2 °C)   SpO2: 95%   Weight: 93 lb 12.8 oz (42.5 kg)   Height: 5' 1\" (1.549 m)     Body mass index is 17.72 kg/m². Based upon direct observation of the patient, evaluation of cognition reveals recent and remote memory intact. Patient's complete Health Risk Assessment and screening values have been reviewed and are found in Flowsheets. The following problems were reviewed today and where indicated follow up appointments were made and/or referrals ordered. Positive Risk Factor Screenings with Interventions:            General Health and ACP:  General  In general, how would you say your health is?: Good  In the past 7 days, have you experienced any of the following?  New or Increased Pain, New or Increased Fatigue, Loneliness, Social Isolation, Stress or Anger?: None of These  Do you get the social and emotional support that you need?: Yes  Do you have a Living Will?: Yes  Advance Directives     Power of [facility] Will ACP-Advance Directive ACP-Power of     Not on File Not on File Not on File Not on File      General Health Risk Interventions:  · No Living Will: ACP documents already completed- patient asked to provide copy to the office    Health Habits/Nutrition:  Health Habits/Nutrition  Do you exercise for at least 20 minutes 2-3 times per week?: (!) No  Have you lost any weight without trying in the past 3 months?: No  Do you eat only one meal per day?: No  Have you seen the dentist within the past year?: (!) No  Body mass index: (!) 17.72  Health Habits/Nutrition Interventions:  · Dental exam overdue:  patient encouraged to make appointment with his/her dentist    Hearing/Vision:  No exam data present  Hearing/Vision  Do you or your family notice any trouble with your hearing that hasn't been managed with hearing aids?: No  Do you have difficulty driving, watching TV, or doing any of your daily activities because of your eyesight?: (!) Yes  Have you had an eye exam within the past year?: (!) No  Hearing/Vision Interventions:  · Vision concerns:  patient encouraged to make appointment with his/her eye specialist    Safety:  Safety  Do you have working smoke detectors?: Yes  Have all throw rugs been removed or fastened?: (!) No  Do you have non-slip mats or surfaces in all bathtubs/showers?: Yes  Do all of your stairways have a railing or banister?: Yes  Are your doorways, halls and stairs free of clutter?: Yes  Do you always fasten your seatbelt when you are in a car?: Yes  Safety Interventions:  · Home safety tips provided    ADL:  ADLs  In the past 7 days, did you need help from others to perform any of the following everyday activities? Eating, dressing, grooming, bathing, toileting, or walking/balance?: (!) Bathing  In the past 7 days, did you need help from others to take care of any of the following?  Laundry, housekeeping, banking/finances, shopping, telephone use, food preparation, transportation, or taking medications?: (!) Transportation  ADL Interventions:  · Patient declines any further evaluation/treatment for this issue    Personalized Preventive Plan   Current Health Maintenance Status  Immunization History   Administered Date(s) Administered    COVID-19, Muse Peter, PF, 30mcg/0.3mL 02/26/2021, 03/19/2021    Influenza A ([id number]) Vaccine PF IM 12/29/2009    Influenza Vaccine, unspecified formulation 11/21/2016    Influenza Virus Vaccine 10/28/2008, 10/22/2012, 10/22/2013, 10/03/2014, 10/01/2015, 11/21/2016, 12/07/2017, 01/25/2018    Influenza Whole 09/11/2009, 10/15/2010, 09/29/2011    Influenza, High Dose (Fluzone 65 yrs and older) 11/29/2018    Influenza, Quadv, IM, (6 mo and older Fluzone, Flulaval, Fluarix and 3 yrs and older Afluria) 11/21/2016    Influenza, Quadv, IM, PF (6 mo and older Fluzone, Flulaval, Fluarix, and 3 yrs and older Afluria) 01/25/2018    Influenza, Quadv, adjuvanted, 65 yrs +, IM, PF (Fluad) 10/08/2020, 10/08/2021    Influenza, Triv, inactivated, subunit, adjuvanted, IM (Fluad 65 yrs and older) 09/10/2019    Pneumococcal Conjugate 13-valent ([id number]) 03/05/2018    Pneumococcal Conjugate Vaccine 09/10/2012    Pneumococcal Polysaccharide ([id number]) 09/10/2012, 12/07/2017, 05/30/2019    Tdap (Boostrix, Adacel) 04/24/2018    Zoster Live (Zostavax) 02/10/2015    Zoster Recombinant (Shingrix) 04/24/2018, 11/07/2018        Health Maintenance   Topic Date Due    Annual Wellness Visit (AWV)  06/24/2021    COVID-19 Vaccine (3 - Booster for Pfizer series) 09/19/2021    Potassium monitoring  08/31/2022    Creatinine monitoring  08/31/2022    DTaP/Tdap/Td vaccine (2 - Td or Tdap) 04/24/2028    Flu vaccine  Completed    Shingles Vaccine  Completed    Pneumococcal 65+ years Vaccine  Completed    Hepatitis A vaccine  Aged Out    Hepatitis B vaccine  Aged Out    Hib vaccine  Aged Out    Meningococcal (ACWY) vaccine  Aged Out     Recommendations for [employer] Due: see orders and patient instructions/AVS.  . Recommended screening schedule for the next 5-10 years is provided to the patient in written form: see Patient Instructions/AVS.    Chase Ortega was seen today for medicare awv and 3 month follow-up. Diagnoses and all orders for this visit:    Routine general medical examination at a health care facility    Chronic midline low back pain without sciatica  -     Discontinue: oxyCODONE-acetaminophen (PERCOCET) 7.5-325 MG per tablet; Take 1 tablet by mouth every 6 hours as needed for Pain for up to 30 days. Intended supply: 30 days  -     Discontinue: oxyCODONE-acetaminophen (PERCOCET) 7.5-325 MG per tablet;  Take 1 tablet by mouth every 6 hours as needed for Pain for up to 30 days. Intended supply: 30 days  -     oxyCODONE-acetaminophen (PERCOCET) 7.5-325 MG per tablet; Take 1 tablet by mouth every 6 hours as needed for Pain for up to 30 days. Intended supply: 30 days    Opioid use, unspecified with unspecified opioid-induced disorder    Essential hypertension           Return in 3 months (on 3/7/2022) for HTN follow up, Knee pain follow up.     David Jansen MD  12/7/2021  12:49 PM

## 2022-01-20 DIAGNOSIS — D64.9 ANEMIA, UNSPECIFIED TYPE: ICD-10-CM

## 2022-01-20 RX ORDER — PNV NO.95/FERROUS FUM/FOLIC AC 28MG-0.8MG
TABLET ORAL
Qty: 90 TABLET | Refills: 5 | Status: SHIPPED | OUTPATIENT
Start: 2022-01-20

## 2022-01-30 DIAGNOSIS — M54.50 CHRONIC BILATERAL LOW BACK PAIN WITHOUT SCIATICA: ICD-10-CM

## 2022-01-30 DIAGNOSIS — G89.29 CHRONIC BILATERAL LOW BACK PAIN WITHOUT SCIATICA: ICD-10-CM

## 2022-01-31 RX ORDER — GABAPENTIN 300 MG/1
CAPSULE ORAL
Qty: 120 CAPSULE | Refills: 2 | Status: SHIPPED | OUTPATIENT
Start: 2022-01-31 | End: 2022-07-25

## 2022-01-31 NOTE — TELEPHONE ENCOUNTER
Pt hasn't filled since 9/2021, is pt to be on? Dara Matos called requesting a refill of the below medication which has been pended for you:     Requested Prescriptions     Pending Prescriptions Disp Refills    gabapentin (NEURONTIN) 300 MG capsule [Pharmacy Med Name: Gabapentin 300 MG Oral Capsule] 120 capsule 0     Sig: Take 2 capsules by mouth twice daily       Last Appointment Date: 12/7/2021  Next Appointment Date: 3/10/2022    Allergies   Allergen Reactions    Atorvastatin      Other reaction(s): Muscle Pain    Codeine     Diltiazem Hcl Hives    Levofloxacin      Other reaction(s):  Intolerance-unknown    Pregabalin      lyrica    Simvastatin      Other reaction(s): Muscle Pain

## 2022-02-16 NOTE — TELEPHONE ENCOUNTER
[facility] uses [facility] Duration Of Freeze Thaw-Cycle (Seconds): 0 Show Applicator Variable?: Yes Render Note In Bullet Format When Appropriate: No Detail Level: Detailed Consent: The patient's consent was obtained including but not limited to risks of crusting, scabbing, blistering, scarring, darker or lighter pigmentary change, recurrence, incomplete removal and infection. Number Of Freeze-Thaw Cycles: 1 freeze-thaw cycle Post-Care Instructions: I reviewed with the patient in detail post-care instructions. Patient is to wear sunprotection, and avoid picking at any of the treated lesions. Pt may apply Vaseline to crusted or scabbing areas.

## 2022-03-03 DIAGNOSIS — M54.50 CHRONIC MIDLINE LOW BACK PAIN WITHOUT SCIATICA: ICD-10-CM

## 2022-03-03 DIAGNOSIS — G89.29 CHRONIC MIDLINE LOW BACK PAIN WITHOUT SCIATICA: ICD-10-CM

## 2022-03-03 RX ORDER — OXYCODONE AND ACETAMINOPHEN 7.5; 325 MG/1; MG/1
1 TABLET ORAL EVERY 6 HOURS PRN
Qty: 120 TABLET | Refills: 0 | OUTPATIENT
Start: 2022-03-03 | End: 2022-04-02

## 2022-03-07 DIAGNOSIS — M54.50 CHRONIC MIDLINE LOW BACK PAIN WITHOUT SCIATICA: ICD-10-CM

## 2022-03-07 DIAGNOSIS — G89.29 CHRONIC MIDLINE LOW BACK PAIN WITHOUT SCIATICA: ICD-10-CM

## 2022-03-07 RX ORDER — OXYCODONE AND ACETAMINOPHEN 7.5; 325 MG/1; MG/1
1 TABLET ORAL EVERY 6 HOURS PRN
Qty: 120 TABLET | Refills: 0 | Status: SHIPPED | OUTPATIENT
Start: 2022-03-07 | End: 2022-03-10 | Stop reason: SDUPTHER

## 2022-03-10 ENCOUNTER — OFFICE VISIT (OUTPATIENT)
Dept: FAMILY MEDICINE CLINIC | Age: 87
End: 2022-03-10
Payer: MEDICARE

## 2022-03-10 VITALS
BODY MASS INDEX: 17.37 KG/M2 | SYSTOLIC BLOOD PRESSURE: 136 MMHG | HEART RATE: 65 BPM | DIASTOLIC BLOOD PRESSURE: 76 MMHG | HEIGHT: 61 IN | OXYGEN SATURATION: 95 % | WEIGHT: 92 LBS | TEMPERATURE: 97.6 F

## 2022-03-10 DIAGNOSIS — J41.8 MIXED SIMPLE AND MUCOPURULENT CHRONIC BRONCHITIS (HCC): ICD-10-CM

## 2022-03-10 DIAGNOSIS — G89.29 CHRONIC MIDLINE LOW BACK PAIN WITHOUT SCIATICA: Primary | ICD-10-CM

## 2022-03-10 DIAGNOSIS — M54.50 CHRONIC MIDLINE LOW BACK PAIN WITHOUT SCIATICA: Primary | ICD-10-CM

## 2022-03-10 DIAGNOSIS — F11.99 OPIOID USE, UNSPECIFIED WITH UNSPECIFIED OPIOID-INDUCED DISORDER (HCC): ICD-10-CM

## 2022-03-10 DIAGNOSIS — I50.9 ACUTE HEART FAILURE, UNSPECIFIED HEART FAILURE TYPE (HCC): ICD-10-CM

## 2022-03-10 DIAGNOSIS — J06.9 VIRAL URI: ICD-10-CM

## 2022-03-10 PROCEDURE — G8484 FLU IMMUNIZE NO ADMIN: HCPCS | Performed by: FAMILY MEDICINE

## 2022-03-10 PROCEDURE — 1123F ACP DISCUSS/DSCN MKR DOCD: CPT | Performed by: FAMILY MEDICINE

## 2022-03-10 PROCEDURE — 99214 OFFICE O/P EST MOD 30 MIN: CPT | Performed by: FAMILY MEDICINE

## 2022-03-10 PROCEDURE — 99212 OFFICE O/P EST SF 10 MIN: CPT

## 2022-03-10 PROCEDURE — G8419 CALC BMI OUT NRM PARAM NOF/U: HCPCS | Performed by: FAMILY MEDICINE

## 2022-03-10 PROCEDURE — 3023F SPIROM DOC REV: CPT | Performed by: FAMILY MEDICINE

## 2022-03-10 PROCEDURE — G8427 DOCREV CUR MEDS BY ELIG CLIN: HCPCS | Performed by: FAMILY MEDICINE

## 2022-03-10 PROCEDURE — 1090F PRES/ABSN URINE INCON ASSESS: CPT | Performed by: FAMILY MEDICINE

## 2022-03-10 PROCEDURE — 4040F PNEUMOC VAC/ADMIN/RCVD: CPT | Performed by: FAMILY MEDICINE

## 2022-03-10 PROCEDURE — 1036F TOBACCO NON-USER: CPT | Performed by: FAMILY MEDICINE

## 2022-03-10 RX ORDER — FLUTICASONE PROPIONATE 50 MCG
2 SPRAY, SUSPENSION (ML) NASAL DAILY
Qty: 16 G | Refills: 0 | COMMUNITY
Start: 2022-03-10

## 2022-03-10 RX ORDER — OXYCODONE AND ACETAMINOPHEN 7.5; 325 MG/1; MG/1
1 TABLET ORAL EVERY 6 HOURS PRN
Qty: 120 TABLET | Refills: 0 | Status: SHIPPED | OUTPATIENT
Start: 2022-03-10 | End: 2022-03-11 | Stop reason: SDUPTHER

## 2022-03-10 RX ORDER — GUAIFENESIN 600 MG/1
600 TABLET, EXTENDED RELEASE ORAL 2 TIMES DAILY
Qty: 30 TABLET | Refills: 0 | COMMUNITY
Start: 2022-03-10 | End: 2022-03-25

## 2022-03-10 ASSESSMENT — ENCOUNTER SYMPTOMS
COUGH: 0
SINUS PRESSURE: 1
WHEEZING: 0
SHORTNESS OF BREATH: 0
SINUS COMPLAINT: 1
CHEST TIGHTNESS: 0
SORE THROAT: 0

## 2022-03-10 NOTE — PROGRESS NOTES
[facility] [address]  [address] [address]  Dept: [phone]  Dept Fax: [phone]  Loc: [phone]    Heavenly Dougherty is a 80 y.o. female who presents today for her medical conditions/complaints as noted below. Heavenly Dougherty is c/o of   Chief Complaint   Patient presents with    3 Month Follow-Up     hypertension -seen in  on Mon    Other     [facility] didn't have percocet-needs sent to [facility]       HPI:     Here today for a follow up of her back pain. Sinus Problem  This is a new problem. The current episode started in the past 7 days (5 days). The problem has been gradually improving since onset. There has been no fever. Her pain is at a severity of 4/10. The pain is moderate. Associated symptoms include congestion, headaches and sinus pressure. Pertinent negatives include no chills, coughing, ear pain, shortness of breath or sore throat. Past treatments include oral decongestants (coracedin). The treatment provided mild relief. She has been doing pretty well. She has been doing okay with her back pain. She is still able to do all of her regular activities. She has been going out to eat a lot. She has not had any falls. She has a walker available but she doesn't use it. She uses a cane occasionally.    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 Intended supply: 30 days 120 tablet 0    guaiFENesin (MUCINEX) 600 MG extended release tablet Take 1 tablet by mouth 2 times daily for 15 days 30 tablet 0    fluticasone (FLONASE) 50 MCG/ACT nasal spray 2 sprays by Each Nostril route daily 16 g 0    gabapentin (NEURONTIN) 300 MG capsule Take 2 capsules by mouth twice daily 120 capsule 2    Ferrous Sulfate (IRON) 325 (65 Fe) MG TABS TAKE 1 TABLET BY MOUTH THREE TIMES DAILY 90 tablet 5    rOPINIRole (REQUIP) 0.25 MG tablet TAKE 1 TABLET BY MOUTH NIGHTLY 90 tablet 1    amLODIPine (NORVASC) 5 MG tablet Take 1 tablet by mouth twice daily 180 tablet 1    hydrALAZINE (APRESOLINE) 25 MG tablet Take 1/2 (one-half) tablet by mouth twice daily 90 tablet 1    carvedilol (COREG) 12.5 MG tablet TAKE 1 TABLET BY MOUTH TWICE DAILY WITH MEALS 180 tablet 1    TOBRADEX ST 0.3-0.05 % SUSP INSTILL 1 DROP INTO RIGHT EYE THREE TIMES DAILY      busPIRone (BUSPAR) 7.5 MG tablet Take 1 tablet by mouth 3 times daily as needed (anxiety) 90 tablet 3    isosorbide mononitrate (IMDUR) 30 MG extended release tablet Take 1 tablet by mouth daily 90 tablet 1    glucose monitoring (FREESTYLE FREEDOM) kit 1 kit by Does not apply route daily 1 kit 0    blood glucose monitor strips Test ONCE times day & as needed for symptoms of irregular blood glucose. Dispense sufficient amount for indicated testing frequency plus additional to accommodate PRN testing needs. 100 strip 0    Lancets MISC 1 each by Does not apply route daily 100 each 1    Handicap Placard MISC by Does not apply route Exp 3/1/2026 1 each 0    lisinopril (PRINIVIL;ZESTRIL) 2.5 MG tablet Take 1 tablet by mouth daily 180 tablet 2    Glycerin, Laxative, (GLYCERIN ADULT) 2.1 g suppository Place 1 suppository rectally as needed (constipation) 5 suppository 0    polyethylene glycol (GLYCOLAX) 17 g packet Take 17 g by mouth daily       No current facility-administered medications for this visit. Allergies   Allergen Reactions    Atorvastatin      Other reaction(s): Muscle Pain    Codeine     Diltiazem Hcl Hives    Levofloxacin      Other reaction(s): Intolerance-unknown    Pregabalin      lyrica    Simvastatin      Other reaction(s): Muscle Pain       Subjective:     Review of Systems   Constitutional: Negative for activity change, appetite change, chills, fatigue and fever. HENT: Positive for congestion and sinus pressure. Negative for ear pain and sore throat. Eyes: Negative for visual disturbance. Respiratory: Negative for cough, chest tightness, shortness of breath and wheezing. Cardiovascular: Negative for chest pain, palpitations and leg swelling. Genitourinary: Negative for difficulty urinating. Neurological: Positive for headaches. Negative for dizziness, syncope, weakness and light-headedness. Objective:      Physical Exam  Vitals and nursing note reviewed. Constitutional:       General: She is not in acute distress. Appearance: She is well-developed. HENT:      Nose: Mucosal edema present. Right Sinus: No maxillary sinus tenderness or frontal sinus tenderness. Left Sinus: No maxillary sinus tenderness or frontal sinus tenderness. Mouth/Throat:      Pharynx: No oropharyngeal exudate. Eyes:      Conjunctiva/sclera: Conjunctivae normal.   Cardiovascular:      Rate and Rhythm: Normal rate and regular rhythm. Heart sounds: Normal heart sounds. No murmur heard. Pulmonary:      Effort: Pulmonary effort is normal. No respiratory distress. Breath sounds: Normal breath sounds. No wheezing or rales. Musculoskeletal:      Cervical back: Normal range of motion and neck supple. Lymphadenopathy:      Cervical: No cervical adenopathy. Skin:     General: Skin is warm and dry. Findings: No rash. Neurological:      Mental Status: She is alert and oriented to person, place, and time.    Psychiatric:         Behavior: Behavior encounter. Orders Placed This Encounter   Medications    DISCONTD: oxyCODONE-acetaminophen (PERCOCET) 7.5-325 MG per tablet     Sig: Take 1 tablet by mouth every 6 hours as needed for Pain for up to 30 days. Intended supply: 30 days     Dispense:  120 tablet     Refill:  0     Reduce doses taken as pain becomes manageable    guaiFENesin (MUCINEX) 600 MG extended release tablet     Sig: Take 1 tablet by mouth 2 times daily for 15 days     Dispense:  30 tablet     Refill:  0    fluticasone (FLONASE) 50 MCG/ACT nasal spray     Si sprays by Each Nostril route daily     Dispense:  16 g     Refill:  0    DISCONTD: oxyCODONE-acetaminophen (PERCOCET) 7.5-325 MG per tablet     Sig: Take 1 tablet by mouth every 6 hours as needed for Pain for up to 30 days. Intended supply: 30 days     Dispense:  120 tablet     Refill:  0     Do not fill until 22    oxyCODONE-acetaminophen (PERCOCET) 7.5-325 MG per tablet     Sig: Take 1 tablet by mouth every 6 hours as needed for Pain for up to 30 days. Intended supply: 30 days     Dispense:  120 tablet     Refill:  0     Do not fill until 22       Patientgiven educational materials - see patient instructions. Discussed use, benefit,and side effects of prescribed medications. All patient questions answered. Ptvoiced understanding. Reviewed health maintenance. Instructed to continue currentmedications, diet and exercise. Patient agreed with treatment plan. Follow up asdirected.      Electronically signed by Judith Alvarado MD on 3/11/2022 at 5:34 PM

## 2022-03-11 RX ORDER — OXYCODONE AND ACETAMINOPHEN 7.5; 325 MG/1; MG/1
1 TABLET ORAL EVERY 6 HOURS PRN
Qty: 120 TABLET | Refills: 0 | Status: SHIPPED | OUTPATIENT
Start: 2022-03-11 | End: 2022-03-11 | Stop reason: SDUPTHER

## 2022-03-11 RX ORDER — OXYCODONE AND ACETAMINOPHEN 7.5; 325 MG/1; MG/1
1 TABLET ORAL EVERY 6 HOURS PRN
Qty: 120 TABLET | Refills: 0 | Status: SHIPPED | OUTPATIENT
Start: 2022-03-11 | End: 2022-05-06 | Stop reason: SDUPTHER

## 2022-03-14 ENCOUNTER — TELEPHONE (OUTPATIENT)
Dept: FAMILY MEDICINE CLINIC | Age: 87
End: 2022-03-14

## 2022-03-14 RX ORDER — AMOXICILLIN 500 MG/1
500 CAPSULE ORAL 2 TIMES DAILY
Qty: 20 CAPSULE | Refills: 0 | Status: SHIPPED | OUTPATIENT
Start: 2022-03-14 | End: 2022-03-24

## 2022-03-14 NOTE — TELEPHONE ENCOUNTER
Left message informing pt of Amoxicillin being sent to [facility]. To return call if any questions.

## 2022-03-14 NOTE — TELEPHONE ENCOUNTER
Pt calling stating she was seen on 3-10 and told if no improvement in her URI symptoms that [facility] would call something in, pt states she's still very congested and spitting up green phlegm, pt uses pended pharmacy, please advise at above number.

## 2022-03-29 DIAGNOSIS — I10 ESSENTIAL HYPERTENSION: ICD-10-CM

## 2022-03-29 RX ORDER — CARVEDILOL 12.5 MG/1
TABLET ORAL
Qty: 180 TABLET | Refills: 1 | Status: SHIPPED | OUTPATIENT
Start: 2022-03-29 | End: 2022-10-31

## 2022-03-29 RX ORDER — LISINOPRIL 2.5 MG/1
TABLET ORAL
Qty: 90 TABLET | Refills: 1 | Status: SHIPPED | OUTPATIENT
Start: 2022-03-29 | End: 2022-10-31

## 2022-03-29 RX ORDER — AMLODIPINE BESYLATE 5 MG/1
TABLET ORAL
Qty: 180 TABLET | Refills: 1 | Status: SHIPPED | OUTPATIENT
Start: 2022-03-29 | End: 2022-10-31

## 2022-03-29 RX ORDER — HYDRALAZINE HYDROCHLORIDE 25 MG/1
TABLET, FILM COATED ORAL
Qty: 90 TABLET | Refills: 1 | Status: SHIPPED | OUTPATIENT
Start: 2022-03-29 | End: 2022-10-31

## 2022-03-30 RX ORDER — ISOSORBIDE MONONITRATE 30 MG/1
TABLET, EXTENDED RELEASE ORAL
Qty: 90 TABLET | Refills: 1 | Status: SHIPPED | OUTPATIENT
Start: 2022-03-30 | End: 2022-07-25

## 2022-03-30 NOTE — TELEPHONE ENCOUNTER
Roz Diaz called requesting a refill of the below medication which has been pended for you:     Requested Prescriptions     Pending Prescriptions Disp Refills    isosorbide mononitrate (IMDUR) 30 MG extended release tablet [Pharmacy Med Name: Isosorbide Mononitrate ER 30 MG Oral Tablet Extended Release 24 Hour] 90 tablet 1     Sig: Take 1 tablet by mouth once daily       Last Appointment Date: 3/10/2022  Next Appointment Date: 6/13/2022    Allergies   Allergen Reactions    Atorvastatin      Other reaction(s): Muscle Pain    Codeine     Diltiazem Hcl Hives    Levofloxacin      Other reaction(s):  Intolerance-unknown    Pregabalin      lyrica    Simvastatin      Other reaction(s): Muscle Pain

## 2022-04-27 RX ORDER — BUSPIRONE HYDROCHLORIDE 7.5 MG/1
TABLET ORAL
Qty: 90 TABLET | Refills: 0 | Status: SHIPPED | OUTPATIENT
Start: 2022-04-27 | End: 2022-06-13 | Stop reason: SDUPTHER

## 2022-04-27 NOTE — TELEPHONE ENCOUNTER
Prema Martinez called requesting a refill of the below medication which has been pended for you:     Requested Prescriptions     Pending Prescriptions Disp Refills    busPIRone (BUSPAR) 7.5 MG tablet [Pharmacy Med Name: busPIRone HCl 7.5 MG Oral Tablet] 90 tablet 0     Sig: TAKE 1 TABLET BY MOUTH THREE TIMES DAILY AS NEEDED FOR ANXIETY       Last Appointment Date: 3/10/2022  Next Appointment Date: 6/13/2022    Allergies   Allergen Reactions    Atorvastatin      Other reaction(s): Muscle Pain    Codeine     Diltiazem Hcl Hives    Levofloxacin      Other reaction(s):  Intolerance-unknown    Pregabalin      lyrica    Simvastatin      Other reaction(s): Muscle Pain

## 2022-04-29 RX ORDER — ROPINIROLE 0.25 MG/1
TABLET, FILM COATED ORAL
Qty: 90 TABLET | Refills: 1 | Status: SHIPPED | OUTPATIENT
Start: 2022-04-29 | End: 2022-10-31 | Stop reason: SDUPTHER

## 2022-04-29 NOTE — TELEPHONE ENCOUNTER
Felicia Brandon called requesting a refill of the below medication which has been pended for you:     Requested Prescriptions     Pending Prescriptions Disp Refills    rOPINIRole (REQUIP) 0.25 MG tablet [Pharmacy Med Name: rOPINIRole HCl 0.25 MG Oral Tablet] 90 tablet 1     Sig: Take 1 tablet by mouth nightly       Last Appointment Date: 3/10/2022  Next Appointment Date: 6/13/2022    Allergies   Allergen Reactions    Atorvastatin      Other reaction(s): Muscle Pain    Codeine     Diltiazem Hcl Hives    Levofloxacin      Other reaction(s):  Intolerance-unknown    Pregabalin      lyrica    Simvastatin      Other reaction(s): Muscle Pain

## 2022-05-06 DIAGNOSIS — M54.50 CHRONIC MIDLINE LOW BACK PAIN WITHOUT SCIATICA: ICD-10-CM

## 2022-05-06 DIAGNOSIS — G89.29 CHRONIC MIDLINE LOW BACK PAIN WITHOUT SCIATICA: ICD-10-CM

## 2022-05-06 RX ORDER — OXYCODONE AND ACETAMINOPHEN 7.5; 325 MG/1; MG/1
1 TABLET ORAL EVERY 6 HOURS PRN
Qty: 120 TABLET | Refills: 0 | Status: SHIPPED | OUTPATIENT
Start: 2022-05-09 | End: 2022-06-08

## 2022-05-06 NOTE — TELEPHONE ENCOUNTER
Per [facility], last filled 4/9/2022, #120/30 days      Augustine Nathan called requesting a refill of the below medication which has been pended for you:     Requested Prescriptions     Pending Prescriptions Disp Refills    oxyCODONE-acetaminophen (PERCOCET) 7.5-325 MG per tablet 120 tablet 0     Sig: Take 1 tablet by mouth every 6 hours as needed for Pain for up to 30 days. Intended supply: 30 days       Last Appointment Date: 3/10/2022  Next Appointment Date: 6/13/2022    Allergies   Allergen Reactions    Atorvastatin      Other reaction(s): Muscle Pain    Codeine     Diltiazem Hcl Hives    Levofloxacin      Other reaction(s):  Intolerance-unknown    Pregabalin      lyrica    Simvastatin      Other reaction(s): Muscle Pain

## 2022-06-02 ENCOUNTER — TELEPHONE (OUTPATIENT)
Dept: FAMILY MEDICINE CLINIC | Age: 87
End: 2022-06-02

## 2022-06-02 DIAGNOSIS — M54.50 CHRONIC MIDLINE LOW BACK PAIN WITHOUT SCIATICA: ICD-10-CM

## 2022-06-02 DIAGNOSIS — G89.29 CHRONIC MIDLINE LOW BACK PAIN WITHOUT SCIATICA: ICD-10-CM

## 2022-06-02 RX ORDER — OXYCODONE AND ACETAMINOPHEN 7.5; 325 MG/1; MG/1
1 TABLET ORAL EVERY 6 HOURS PRN
Qty: 120 TABLET | Refills: 0 | Status: CANCELLED | OUTPATIENT
Start: 2022-06-02 | End: 2022-07-02

## 2022-06-02 NOTE — TELEPHONE ENCOUNTER
----- Message from [facility] sent at 6/2/2022  9:02 AM EDT -----  Subject: Refill Request    QUESTIONS  Name of Medication? oxyCODONE-acetaminophen (PERCOCET) 7.5-325 MG per   tablet  Patient-reported dosage and instructions? 1 tablet by mouth every 6 hours   as needed for pain  How many days do you have left? 4  Preferred Pharmacy? [address] [facility]  Pharmacy phone number (if available)? [phone]  ---------------------------------------------------------------------------  --------------  CALL BACK INFO  What is the best way for the office to contact you? OK to leave message on   voicemail  Preferred Call Back Phone Number? [phone]  ---------------------------------------------------------------------------  --------------  SCRIPT ANSWERS  Relationship to Patient?  Self

## 2022-06-13 ENCOUNTER — OFFICE VISIT (OUTPATIENT)
Dept: FAMILY MEDICINE CLINIC | Age: 87
End: 2022-06-13
Payer: MEDICARE

## 2022-06-13 VITALS
SYSTOLIC BLOOD PRESSURE: 150 MMHG | HEART RATE: 70 BPM | HEIGHT: 61 IN | DIASTOLIC BLOOD PRESSURE: 60 MMHG | BODY MASS INDEX: 18.28 KG/M2 | WEIGHT: 96.8 LBS | OXYGEN SATURATION: 92 %

## 2022-06-13 DIAGNOSIS — M54.50 CHRONIC BILATERAL LOW BACK PAIN WITHOUT SCIATICA: Primary | ICD-10-CM

## 2022-06-13 DIAGNOSIS — M72.2 PLANTAR FASCIITIS, BILATERAL: ICD-10-CM

## 2022-06-13 DIAGNOSIS — G89.29 CHRONIC BILATERAL LOW BACK PAIN WITHOUT SCIATICA: Primary | ICD-10-CM

## 2022-06-13 PROCEDURE — 99212 OFFICE O/P EST SF 10 MIN: CPT

## 2022-06-13 PROCEDURE — 1090F PRES/ABSN URINE INCON ASSESS: CPT | Performed by: FAMILY MEDICINE

## 2022-06-13 PROCEDURE — G8427 DOCREV CUR MEDS BY ELIG CLIN: HCPCS | Performed by: FAMILY MEDICINE

## 2022-06-13 PROCEDURE — G8419 CALC BMI OUT NRM PARAM NOF/U: HCPCS | Performed by: FAMILY MEDICINE

## 2022-06-13 PROCEDURE — 99213 OFFICE O/P EST LOW 20 MIN: CPT | Performed by: FAMILY MEDICINE

## 2022-06-13 PROCEDURE — 1123F ACP DISCUSS/DSCN MKR DOCD: CPT | Performed by: FAMILY MEDICINE

## 2022-06-13 PROCEDURE — 1036F TOBACCO NON-USER: CPT | Performed by: FAMILY MEDICINE

## 2022-06-13 RX ORDER — OXYCODONE AND ACETAMINOPHEN 7.5; 325 MG/1; MG/1
1 TABLET ORAL EVERY 6 HOURS PRN
Qty: 120 TABLET | Refills: 0 | Status: SHIPPED | OUTPATIENT
Start: 2022-06-13 | End: 2022-06-13 | Stop reason: SDUPTHER

## 2022-06-13 RX ORDER — OXYCODONE AND ACETAMINOPHEN 7.5; 325 MG/1; MG/1
TABLET ORAL
COMMUNITY
Start: 2022-06-08 | End: 2022-06-13 | Stop reason: SDUPTHER

## 2022-06-13 RX ORDER — OXYCODONE AND ACETAMINOPHEN 7.5; 325 MG/1; MG/1
1 TABLET ORAL EVERY 6 HOURS PRN
Qty: 120 TABLET | Refills: 0 | Status: CANCELLED | OUTPATIENT
Start: 2022-06-13 | End: 2022-07-13

## 2022-06-13 RX ORDER — OXYCODONE AND ACETAMINOPHEN 7.5; 325 MG/1; MG/1
1 TABLET ORAL EVERY 6 HOURS PRN
Qty: 120 TABLET | Refills: 0 | Status: SHIPPED | OUTPATIENT
Start: 2022-06-13 | End: 2022-07-05 | Stop reason: SDUPTHER

## 2022-06-13 RX ORDER — BUSPIRONE HYDROCHLORIDE 10 MG/1
10 TABLET ORAL 3 TIMES DAILY PRN
Qty: 90 TABLET | Refills: 1 | Status: SHIPPED | OUTPATIENT
Start: 2022-06-13

## 2022-06-13 ASSESSMENT — PATIENT HEALTH QUESTIONNAIRE - PHQ9
1. LITTLE INTEREST OR PLEASURE IN DOING THINGS: 0
SUM OF ALL RESPONSES TO PHQ QUESTIONS 1-9: 0
2. FEELING DOWN, DEPRESSED OR HOPELESS: 0
SUM OF ALL RESPONSES TO PHQ9 QUESTIONS 1 & 2: 0
SUM OF ALL RESPONSES TO PHQ QUESTIONS 1-9: 0

## 2022-06-13 ASSESSMENT — ENCOUNTER SYMPTOMS
SHORTNESS OF BREATH: 0
WHEEZING: 0
COUGH: 0
CHEST TIGHTNESS: 0

## 2022-06-13 NOTE — PROGRESS NOTES
ASIYA Rodriguez [address]  [address] [address]  Dept: [phone]  Dept Fax: [phone]  Loc: [phone]    Reema Foley is a 80 y.o. female who presents today for her medical conditions/complaints as noted below. Reema Foley is c/o of   Chief Complaint   Patient presents with    Back Pain     3 month    Foot Pain     at night        HPI:     HPI Here today for a follow up of her back pain. She has problems with her feet aching at night when she is laying in bed. She tends to just ignore it. She does not take anything for it. The pain is in her heel. No numbness or tingling. Back pain: stable; no recent issues. She has not had any falls. She has not had any issues with numbness or tingling in her legs. No issues with her percocet. She is still getting good relief from it. She is not having any constipation from as long as she takes the miralax with it. She is still living with her sister. 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     L4 & L5    Cancer (HCC)     skin cancers    Depression     History of heart artery stent     Hyperlipidemia     Hypertension     Nerve pain           Social History     Tobacco Use    Smoking status: Never Smoker    Smokeless tobacco: Never Used   Substance Use Topics    Alcohol use: No     Current Outpatient Medications   Medication Sig Dispense Refill    busPIRone (BUSPAR) 10 MG tablet Take 1 tablet by mouth 3 times daily as needed (anxiety) 90 tablet 1    oxyCODONE-acetaminophen (PERCOCET) 7.5-325 MG per tablet Take 1 tablet by mouth every 6 hours as needed for Pain for up to 30 days.  120 tablet 0    rOPINIRole (REQUIP) 0.25 MG tablet Take 1 tablet by mouth nightly 90 tablet 1    isosorbide mononitrate (IMDUR) 30 MG extended release tablet Take 1 tablet by mouth once daily 90 tablet 1  carvedilol (COREG) 12.5 MG tablet TAKE 1 TABLET BY MOUTH TWICE DAILY WITH MEALS 180 tablet 1    amLODIPine (NORVASC) 5 MG tablet Take 1 tablet by mouth twice daily 180 tablet 1    hydrALAZINE (APRESOLINE) 25 MG tablet Take 1/2 (one-half) tablet by mouth twice daily 90 tablet 1    lisinopril (PRINIVIL;ZESTRIL) 2.5 MG tablet Take 1 tablet by mouth once daily 90 tablet 1    fluticasone (FLONASE) 50 MCG/ACT nasal spray 2 sprays by Each Nostril route daily 16 g 0    Ferrous Sulfate (IRON) 325 (65 Fe) MG TABS TAKE 1 TABLET BY MOUTH THREE TIMES DAILY 90 tablet 5    TOBRADEX ST 0.3-0.05 % SUSP INSTILL 1 DROP INTO RIGHT EYE THREE TIMES DAILY      glucose monitoring (FREESTYLE FREEDOM) kit 1 kit by Does not apply route daily 1 kit 0    blood glucose monitor strips Test ONCE times day & as needed for symptoms of irregular blood glucose. Dispense sufficient amount for indicated testing frequency plus additional to accommodate PRN testing needs. 100 strip 0    Lancets MISC 1 each by Does not apply route daily 100 each 1    Handicap Placard MISC by Does not apply route Exp 3/1/2026 1 each 0    Glycerin, Laxative, (GLYCERIN ADULT) 2.1 g suppository Place 1 suppository rectally as needed (constipation) 5 suppository 0    polyethylene glycol (GLYCOLAX) 17 g packet Take 17 g by mouth daily      gabapentin (NEURONTIN) 300 MG capsule Take 2 capsules by mouth twice daily 120 capsule 2     No current facility-administered medications for this visit. Allergies   Allergen Reactions    Atorvastatin      Other reaction(s): Muscle Pain    Codeine     Diltiazem Hcl Hives    Levofloxacin      Other reaction(s): Intolerance-unknown    Pregabalin      lyrica    Simvastatin      Other reaction(s): Muscle Pain       Subjective:     Review of Systems   Constitutional: Negative for activity change, appetite change, chills, fatigue and fever. Eyes: Negative for visual disturbance.    Respiratory: Negative for cough, chest tightness, shortness of breath and wheezing. Cardiovascular: Negative for chest pain, palpitations and leg swelling. Genitourinary: Negative for difficulty urinating. Musculoskeletal: Positive for arthralgias (heel pain). Negative for joint swelling. Neurological: Negative for dizziness, syncope, weakness, light-headedness and headaches. Objective:      Physical Exam  Vitals and nursing note reviewed. Constitutional:       General: She is not in acute distress. Appearance: She is well-developed. Eyes:      Conjunctiva/sclera: Conjunctivae normal.   Neck:      Thyroid: No thyromegaly. Cardiovascular:      Rate and Rhythm: Normal rate and regular rhythm. Heart sounds: Normal heart sounds. No murmur heard. Pulmonary:      Effort: Pulmonary effort is normal. No respiratory distress. Breath sounds: Normal breath sounds. No wheezing. Musculoskeletal:      Cervical back: Normal range of motion and neck supple. Comments: Point tender over the plantar fascia insertion point   Lymphadenopathy:      Cervical: No cervical adenopathy. Skin:     General: Skin is warm and dry. Findings: No erythema or rash. Neurological:      Mental Status: She is alert and oriented to person, place, and time. BP (!) 150/60   Pulse 70   Ht 5' 1\" (1.549 m)   Wt 96 lb 12.8 oz (43.9 kg)   SpO2 92%   BMI 18.29 kg/m²     Assessment:       Diagnosis Orders   1. Chronic bilateral low back pain without sciatica  oxyCODONE-acetaminophen (PERCOCET) 7.5-325 MG per tablet    DISCONTINUED: oxyCODONE-acetaminophen (PERCOCET) 7.5-325 MG per tablet    DISCONTINUED: oxyCODONE-acetaminophen (PERCOCET) 7.5-325 MG per tablet   2. Plantar fasciitis, bilateral               Plan:        Back pain: stable; she is doing well on her percocet. Her pain contract was updated today. I gave her a 3 month's supply of her medications.      Controlled Substance Monitoring:    Acute and Chronic Pain Monitoring:   RX Monitoring 6/13/2022   Attestation -   Periodic Controlled Substance Monitoring Possible medication side effects, risk of tolerance/dependence & alternative treatments discussed. ;No signs of potential drug abuse or diversion identified. ;Assessed functional status. ;Obtaining appropriate analgesic effect of treatment. Chronic Pain > 50 MEDD Considered consultation with a specialist.   Chronic Pain > 80 MEDD -       Plantar fascitis: new; I recommended exercises and freezing a water bottle and running her foot over it to help with the pain. she was advised to let me know if her pain does not improve within the next few weeks. She plans to call a podiatrist in [place] if her pain does not improve. Return in about 3 months (around 9/13/2022). Orders Placed This Encounter   Medications    busPIRone (BUSPAR) 10 MG tablet     Sig: Take 1 tablet by mouth 3 times daily as needed (anxiety)     Dispense:  90 tablet     Refill:  1    DISCONTD: oxyCODONE-acetaminophen (PERCOCET) 7.5-325 MG per tablet     Sig: Take 1 tablet by mouth every 6 hours as needed for Pain for up to 30 days. Dispense:  120 tablet     Refill:  0     Do not fill until 7/7/22    DISCONTD: oxyCODONE-acetaminophen (PERCOCET) 7.5-325 MG per tablet     Sig: Take 1 tablet by mouth every 6 hours as needed for Pain for up to 30 days. Dispense:  120 tablet     Refill:  0     Do not fill until 8/6/22    oxyCODONE-acetaminophen (PERCOCET) 7.5-325 MG per tablet     Sig: Take 1 tablet by mouth every 6 hours as needed for Pain for up to 30 days. Dispense:  120 tablet     Refill:  0     Do not fill until 9/5/22       Patientgiven educational materials - see patient instructions. Discussed use, benefit,and side effects of prescribed medications. All patient questions answered. Ptvoiced understanding. Reviewed health maintenance. Instructed to continue currentmedications, diet and exercise. Patient agreed with treatment plan. Follow up asdirected.      Electronically signed by Jazmin Varner MD on 6/13/2022 at 10:03 AM

## 2022-06-13 NOTE — PATIENT INSTRUCTIONS
Patient Education        Plantar Fasciitis: Exercises  Introduction  Here are some examples of exercises for you to try. The exercises may be suggested for a condition or for rehabilitation. Start each exercise slowly. Ease off the exercises if you start to have pain. You will be told when to start these exercises and which ones will work bestfor you. How to do the exercises  Towel stretch    1. Sit with your legs extended and knees straight. 2. Place a towel around your foot just under the toes. 3. Hold each end of the towel in each hand, with your hands above your knees. 4. Pull back with the towel so that your foot stretches toward you. 5. Hold the position for at least 15 to 30 seconds. 6. Repeat 2 to 4 times a session, up to 5 sessions a day. Calf stretch    This exercise stretches the muscles at the back of the lower leg (the calf) andthe Achilles tendon. Do this exercise 3 or 4 times a day, 5 days a week. 1. Stand facing a wall with your hands on the wall at about eye level. Put the leg you want to stretch about a step behind your other leg. 2. Keeping your back heel on the floor, bend your front knee until you feel a stretch in the back leg. 3. Hold the stretch for 15 to 30 seconds. Repeat 2 to 4 times. Plantar fascia and calf stretch    Stretching the plantar fascia and calf muscles can increase flexibility and decrease heel pain. You can do this exercise several times each day and beforeand after activity. 1. Stand on a step as shown above. Be sure to hold on to the banister. 2. Slowly let your heels down over the edge of the step as you relax your calf muscles. You should feel a gentle stretch across the bottom of your foot and up the back of your leg to your knee. 3. Hold the stretch about 15 to 30 seconds, and then tighten your calf muscle a little to bring your heel back up to the level of the step. Repeat 2 to 4 times.   Towel curls    Make this exercise more challenging by placing a weighted object, such as asoup can, on the other end of the towel. 1. While sitting, place your foot on a towel on the floor and scrunch the towel toward you with your toes. 2. Then, also using your toes, push the towel away from you. Marshall pickups    1. Put marbles on the floor next to a cup.  2. Using your toes, try to lift the marbles up from the floor and put them in the cup. Follow-up care is a key part of your treatment and safety. Be sure to make and go to all appointments, and call your doctor if you are having problems. It's also a good idea to know your test results and keep alist of the medicines you take. Where can you learn more? Go to https://Ubitexx.[employer]. org and sign in to your Brickfish account. Enter L496 in the "[employer] box to learn more about \"Plantar Fasciitis: Exercises. \"     If you do not have an account, please click on the \"Sign Up Now\" link. Current as of: July 1, 2021               Content Version: 13.2  © 2006-2022 [employer]. Care instructions adapted under license by [facility] ([facility]). If you have questions about a medical condition or this instruction, always ask your healthcare professional. [address] any warranty or liability for your use of this information. Patient Education        Plantar Fasciitis: Care Instructions  Overview     Plantar fasciitis is pain and inflammation of the plantar fascia, the tissue at the bottom of your foot that connects the heel bone to the toes. The plantar fascia also supports the arch. If you strain the plantar fascia, it can developsmall tears and cause heel pain when you stand or walk. Plantar fasciitis can be caused by running or other sports. It also may occur in people who are overweight or who have high arches or flat feet. You may get plantar fasciitis if you walk or stand for long periods, or have a tightAchilles tendon or calf muscles.   You can improve your foot pain with rest and other care at home. It might takea few weeks to a few months for your foot to heal completely. Follow-up care is a key part of your treatment and safety. Be sure to make and go to all appointments, and call your doctor if you are having problems. It's also a good idea to know your test results and keep alist of the medicines you take. How can you care for yourself at home?  Rest your feet often. Reduce your activity to a level that lets you avoid pain. If possible, do not run or walk on hard surfaces.  Take pain medicines exactly as directed. ? If the doctor gave you a prescription medicine for pain, take it as prescribed. ? If you are not taking a prescription pain medicine, take an over-the-counter anti-inflammatory medicine for pain and swelling, such as ibuprofen (Advil, Motrin) or naproxen (Aleve). Read and follow all instructions on the label.  Use ice massage to help with pain and swelling. You can use an ice cube or an ice cup several times a day. To make an ice cup, fill a paper cup with water and freeze it. Cut off the top of the cup until a half-inch of ice shows. Hold onto the remaining paper to use the cup. Rub the ice in small circles over the area for 5 to 7 minutes.  Contrast baths, which alternate hot and cold water, can also help reduce swelling. But because heat alone may make pain and swelling worse, end a contrast bath with a soak in cold water.  Wear a night splint if your doctor suggests it. A night splint holds your foot with the toes pointed up and the foot and ankle at a 90-degree angle. This position gives the bottom of your foot a constant, gentle stretch.  Do simple exercises such as calf stretches and towel stretches 2 to 3 times each day, especially when you first get up in the morning. These can help the plantar fascia become more flexible. They also make the muscles that support your arch stronger.  Hold these stretches for 15 to 30 seconds per stretch. Repeat 2 to 4 times. ? Stand about 1 foot from a wall. Place the palms of both hands against the wall at chest level. Lean forward against the wall, keeping one leg with the knee straight and heel on the ground while bending the knee of the other leg.  ? Sit down on the floor or a mat with your feet stretched in front of you. Roll up a towel lengthwise, and loop it over the ball of your foot. Holding the towel at both ends, gently pull the towel toward you to stretch your foot.  Wear shoes with good arch support. Athletic shoes or shoes with a well-cushioned sole are good choices.  Replace athletic shoes regularly.  Try heel cups or shoe inserts (orthotics) to help cushion your heel. You can buy these at many shoe stores.  Put on your shoes as soon as you get out of bed. Going barefoot or wearing slippers may make your pain worse.  Reach and stay at a good weight for your height. This puts less strain on your feet. When should you call for help? Call your doctor now or seek immediate medical care if:     You have heel pain with fever, redness, or warmth in your heel.      You cannot put weight on the sore foot. Watch closely for changes in your health, and be sure to contact your doctor if:     You have numbness or tingling in your heel.      Your heel pain lasts more than 2 weeks. Where can you learn more? Go to https://[employer]ita[employer]ensis.[employer]. org and sign in to your FRESS account. Enter K992 in the [facility] box to learn more about \"Plantar Fasciitis: Care Instructions. \"     If you do not have an account, please click on the \"Sign Up Now\" link. Current as of: July 1, 2021               Content Version: 13.2  © 8092-3780 [employer]. Care instructions adapted under license by [facility] ([facility]).  If you have questions about a medical condition or this instruction, always ask your healthcare professional. Roberto Reaves disclaims any warranty or liability for your use of this information.

## 2022-07-05 DIAGNOSIS — G89.29 CHRONIC BILATERAL LOW BACK PAIN WITHOUT SCIATICA: ICD-10-CM

## 2022-07-05 DIAGNOSIS — M54.50 CHRONIC BILATERAL LOW BACK PAIN WITHOUT SCIATICA: ICD-10-CM

## 2022-07-05 RX ORDER — OXYCODONE AND ACETAMINOPHEN 7.5; 325 MG/1; MG/1
1 TABLET ORAL EVERY 6 HOURS PRN
Qty: 120 TABLET | Refills: 0 | Status: SHIPPED | OUTPATIENT
Start: 2022-07-08 | End: 2022-09-22 | Stop reason: SDUPTHER

## 2022-07-05 NOTE — TELEPHONE ENCOUNTER
Per [facility], last filled 6/8/2022, #120/30 days  Do not fill prior to 7/8/2022 entered. Lon Bland called requesting a refill of the below medication which has been pended for you:     Requested Prescriptions     Pending Prescriptions Disp Refills    oxyCODONE-acetaminophen (PERCOCET) 7.5-325 MG per tablet 120 tablet 0     Sig: Take 1 tablet by mouth every 6 hours as needed for Pain for up to 30 days. Last Appointment Date: 6/13/2022  Next Appointment Date: 9/22/2022    Allergies   Allergen Reactions    Atorvastatin      Other reaction(s): Muscle Pain    Codeine     Diltiazem Hcl Hives    Levofloxacin      Other reaction(s):  Intolerance-unknown    Pregabalin      lyrica    Simvastatin      Other reaction(s): Muscle Pain

## 2022-07-25 DIAGNOSIS — G89.29 CHRONIC BILATERAL LOW BACK PAIN WITHOUT SCIATICA: ICD-10-CM

## 2022-07-25 DIAGNOSIS — M54.50 CHRONIC BILATERAL LOW BACK PAIN WITHOUT SCIATICA: ICD-10-CM

## 2022-07-25 RX ORDER — GABAPENTIN 300 MG/1
CAPSULE ORAL
Qty: 120 CAPSULE | Refills: 0 | Status: SHIPPED | OUTPATIENT
Start: 2022-07-25 | End: 2022-10-31

## 2022-07-25 RX ORDER — ISOSORBIDE MONONITRATE 30 MG/1
TABLET, EXTENDED RELEASE ORAL
Qty: 90 TABLET | Refills: 1 | Status: SHIPPED | OUTPATIENT
Start: 2022-07-25

## 2022-07-25 NOTE — TELEPHONE ENCOUNTER
Chase Ortega called requesting a refill of the below medication which has been pended for you:     Requested Prescriptions     Pending Prescriptions Disp Refills    gabapentin (NEURONTIN) 300 MG capsule [Pharmacy Med Name: Gabapentin 300 MG Oral Capsule] 120 capsule 0     Sig: Take 2 capsules by mouth twice daily    isosorbide mononitrate (IMDUR) 30 MG extended release tablet [Pharmacy Med Name: Isosorbide Mononitrate ER 30 MG Oral Tablet Extended Release 24 Hour] 90 tablet 0     Sig: Take 1 tablet by mouth once daily       Last Appointment Date: 6/13/2022  Next Appointment Date: 9/22/2022    Allergies   Allergen Reactions    Atorvastatin      Other reaction(s): Muscle Pain    Codeine     Diltiazem Hcl Hives    Levofloxacin      Other reaction(s):  Intolerance-unknown    Pregabalin      lyrica    Simvastatin      Other reaction(s): Muscle Pain

## 2022-08-08 DIAGNOSIS — G89.29 CHRONIC BILATERAL LOW BACK PAIN WITHOUT SCIATICA: ICD-10-CM

## 2022-08-08 DIAGNOSIS — M54.50 CHRONIC BILATERAL LOW BACK PAIN WITHOUT SCIATICA: ICD-10-CM

## 2022-08-08 RX ORDER — OXYCODONE AND ACETAMINOPHEN 7.5; 325 MG/1; MG/1
1 TABLET ORAL EVERY 6 HOURS PRN
Qty: 120 TABLET | Refills: 0 | OUTPATIENT
Start: 2022-08-08 | End: 2022-09-07

## 2022-08-08 NOTE — TELEPHONE ENCOUNTER
Pharmacy calling stating pt did not fill her script for July and the other script they have on file is for Sept, can you send new script for this month.

## 2022-08-08 NOTE — TELEPHONE ENCOUNTER
Per [facility], last filled 7/8/2022, #120/30 days. Which was verified with pharmacy. They do have refill for Aug ready for pt to fill. They will have pt contacted.

## 2022-09-22 ENCOUNTER — TELEPHONE (OUTPATIENT)
Dept: FAMILY MEDICINE CLINIC | Age: 87
End: 2022-09-22

## 2022-09-22 ENCOUNTER — OFFICE VISIT (OUTPATIENT)
Dept: FAMILY MEDICINE CLINIC | Age: 87
End: 2022-09-22
Payer: MEDICARE

## 2022-09-22 VITALS
HEART RATE: 70 BPM | OXYGEN SATURATION: 97 % | BODY MASS INDEX: 17.9 KG/M2 | SYSTOLIC BLOOD PRESSURE: 140 MMHG | WEIGHT: 94.8 LBS | DIASTOLIC BLOOD PRESSURE: 80 MMHG | HEIGHT: 61 IN

## 2022-09-22 DIAGNOSIS — G89.29 CHRONIC BILATERAL LOW BACK PAIN WITHOUT SCIATICA: Primary | ICD-10-CM

## 2022-09-22 DIAGNOSIS — M54.50 CHRONIC BILATERAL LOW BACK PAIN WITHOUT SCIATICA: Primary | ICD-10-CM

## 2022-09-22 DIAGNOSIS — F11.99 OPIOID USE, UNSPECIFIED WITH UNSPECIFIED OPIOID-INDUCED DISORDER (HCC): ICD-10-CM

## 2022-09-22 DIAGNOSIS — Z23 FLU VACCINE NEED: ICD-10-CM

## 2022-09-22 DIAGNOSIS — K59.04 CHRONIC IDIOPATHIC CONSTIPATION: ICD-10-CM

## 2022-09-22 PROCEDURE — 1036F TOBACCO NON-USER: CPT | Performed by: FAMILY MEDICINE

## 2022-09-22 PROCEDURE — G8419 CALC BMI OUT NRM PARAM NOF/U: HCPCS | Performed by: FAMILY MEDICINE

## 2022-09-22 PROCEDURE — PBSHW INFLUENZA, FLUAD, (AGE 65 Y+), IM, PF, 0.5 ML: Performed by: FAMILY MEDICINE

## 2022-09-22 PROCEDURE — 99214 OFFICE O/P EST MOD 30 MIN: CPT | Performed by: FAMILY MEDICINE

## 2022-09-22 PROCEDURE — 90694 VACC AIIV4 NO PRSRV 0.5ML IM: CPT | Performed by: FAMILY MEDICINE

## 2022-09-22 PROCEDURE — G0008 ADMIN INFLUENZA VIRUS VAC: HCPCS | Performed by: FAMILY MEDICINE

## 2022-09-22 PROCEDURE — G8427 DOCREV CUR MEDS BY ELIG CLIN: HCPCS | Performed by: FAMILY MEDICINE

## 2022-09-22 PROCEDURE — 1090F PRES/ABSN URINE INCON ASSESS: CPT | Performed by: FAMILY MEDICINE

## 2022-09-22 PROCEDURE — 1123F ACP DISCUSS/DSCN MKR DOCD: CPT | Performed by: FAMILY MEDICINE

## 2022-09-22 RX ORDER — SENNA PLUS 8.6 MG/1
1 TABLET ORAL 2 TIMES DAILY PRN
Qty: 60 TABLET | Refills: 0 | Status: SHIPPED | OUTPATIENT
Start: 2022-09-22 | End: 2023-09-22

## 2022-09-22 RX ORDER — OXYCODONE AND ACETAMINOPHEN 7.5; 325 MG/1; MG/1
1 TABLET ORAL EVERY 6 HOURS PRN
Qty: 120 TABLET | Refills: 0 | Status: SHIPPED | OUTPATIENT
Start: 2022-09-22 | End: 2022-09-22 | Stop reason: SDUPTHER

## 2022-09-22 RX ORDER — OXYCODONE AND ACETAMINOPHEN 7.5; 325 MG/1; MG/1
1 TABLET ORAL EVERY 6 HOURS PRN
Qty: 120 TABLET | Refills: 0 | Status: SHIPPED | OUTPATIENT
Start: 2022-09-22 | End: 2022-10-22

## 2022-09-22 ASSESSMENT — ENCOUNTER SYMPTOMS
RECTAL PAIN: 0
CONSTIPATION: 1
SHORTNESS OF BREATH: 0
CHEST TIGHTNESS: 0
BACK PAIN: 1
NAUSEA: 0
DIARRHEA: 0
COUGH: 0
BLOOD IN STOOL: 0
VOMITING: 0
WHEEZING: 0

## 2022-09-22 NOTE — PROGRESS NOTES
ASIYA Rodriguez [address]  [address] [address]  Dept: [phone]  Dept Fax: [phone]  Loc: [phone]    Christiano Lucero is a 80 y.o. female who presents today for her medical conditions/complaints as noted below. Christiano Lucero is c/o of   Chief Complaint   Patient presents with    Back Pain     3 month    Abdominal Pain     RLQ, BM not reg       HPI:     HPI Here today for a follow up of her back pain. She is getting constipated. She has been having trouble with abdominal pain in the RLQ. She suspects this is due to constipation because she does not have regular bowel movement and when she goes her bowels are large and hard. This has been going on several months. She takes miralax rarely. She doesn't remember to take it very often. She does not have any issues with blood in the stool. She on average goes 0-3 times a weeks. She feels bloated frequently. She has not had any issues with urination. She is not having any issues with nausea or vomiting and not change in appetite or weight loss. Back pain: stable; she has been doing well overall. She has not had any falls. She uses a cane at home occasionally. She does not use a walker. She is getting good relief from the percocet. She is not having any issues with nausea or fatigue from it.        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 120 tablet 0    isosorbide mononitrate (IMDUR) 30 MG extended release tablet Take 1 tablet by mouth once daily 90 tablet 1    busPIRone (BUSPAR) 10 MG tablet Take 1 tablet by mouth 3 times daily as needed (anxiety) 90 tablet 1    rOPINIRole (REQUIP) 0.25 MG tablet Take 1 tablet by mouth nightly 90 tablet 1    carvedilol (COREG) 12.5 MG tablet TAKE 1 TABLET BY MOUTH TWICE DAILY WITH MEALS 180 tablet 1    amLODIPine (NORVASC) 5 MG tablet Take 1 tablet by mouth twice daily 180 tablet 1    hydrALAZINE (APRESOLINE) 25 MG tablet Take 1/2 (one-half) tablet by mouth twice daily 90 tablet 1    lisinopril (PRINIVIL;ZESTRIL) 2.5 MG tablet Take 1 tablet by mouth once daily 90 tablet 1    fluticasone (FLONASE) 50 MCG/ACT nasal spray 2 sprays by Each Nostril route daily 16 g 0    Ferrous Sulfate (IRON) 325 (65 Fe) MG TABS TAKE 1 TABLET BY MOUTH THREE TIMES DAILY 90 tablet 5    TOBRADEX ST 0.3-0.05 % SUSP INSTILL 1 DROP INTO RIGHT EYE THREE TIMES DAILY      glucose monitoring (FREESTYLE FREEDOM) kit 1 kit by Does not apply route daily 1 kit 0    blood glucose monitor strips Test ONCE times day & as needed for symptoms of irregular blood glucose. Dispense sufficient amount for indicated testing frequency plus additional to accommodate PRN testing needs. 100 strip 0    Lancets MISC 1 each by Does not apply route daily 100 each 1    Handicap Placard MISC by Does not apply route Exp 3/1/2026 1 each 0    Glycerin, Laxative, (GLYCERIN ADULT) 2.1 g suppository Place 1 suppository rectally as needed (constipation) 5 suppository 0    polyethylene glycol (GLYCOLAX) 17 g packet Take 17 g by mouth daily      gabapentin (NEURONTIN) 300 MG capsule Take 2 capsules by mouth twice daily 120 capsule 0     No current facility-administered medications for this visit.           Allergies   Allergen Reactions Atorvastatin      Other reaction(s): Muscle Pain    Codeine     Diltiazem Hcl Hives    Levofloxacin      Other reaction(s): Intolerance-unknown    Pregabalin      lyrica    Simvastatin      Other reaction(s): Muscle Pain       Subjective:     Review of Systems   Constitutional:  Negative for activity change, appetite change, chills, fatigue and fever. Eyes:  Negative for visual disturbance. Respiratory:  Negative for cough, chest tightness, shortness of breath and wheezing. Cardiovascular:  Negative for chest pain, palpitations and leg swelling. Gastrointestinal:  Positive for constipation. Negative for blood in stool, diarrhea, nausea, rectal pain and vomiting. Genitourinary:  Negative for difficulty urinating. Musculoskeletal:  Positive for back pain. Negative for gait problem. Neurological:  Negative for dizziness, syncope, weakness, light-headedness and headaches. Psychiatric/Behavioral:  Negative for sleep disturbance. Objective:      Physical Exam  Vitals and nursing note reviewed. Constitutional:       General: She is not in acute distress. Appearance: She is well-developed. Eyes:      Conjunctiva/sclera: Conjunctivae normal.   Neck:      Thyroid: No thyromegaly. Cardiovascular:      Rate and Rhythm: Normal rate and regular rhythm. Heart sounds: Normal heart sounds. No murmur heard. Pulmonary:      Effort: Pulmonary effort is normal. No respiratory distress. Breath sounds: Normal breath sounds. No wheezing. Musculoskeletal:      Cervical back: Normal range of motion and neck supple. Lymphadenopathy:      Cervical: No cervical adenopathy. Skin:     General: Skin is warm and dry. Findings: No erythema or rash. Neurological:      Mental Status: She is alert and oriented to person, place, and time.    Psychiatric:         Mood and Affect: Mood normal.         Behavior: Behavior normal.     BP (!) 140/80   Pulse 70   Ht 5' 1\" (1.549 m)   Wt 94 lb 12.8 oz tablet     Sig: Take 1 tablet by mouth every 6 hours as needed for Pain for up to 30 days. Dispense:  120 tablet     Refill:  0     Do not fill until 12/2/22       Patientgiven educational materials - see patient instructions. Discussed use, benefit,and side effects of prescribed medications. All patient questions answered. Ptvoiced understanding. Reviewed health maintenance. Instructed to continue currentmedications, diet and exercise. Patient agreed with treatment plan. Follow up asdirected.      Electronically signed by Kelsy Bruno MD on 9/22/2022 at 9:31 AM

## 2022-09-22 NOTE — TELEPHONE ENCOUNTER
Pt had inquired for portable 02 for upcoming wedding to [place]. Pt uses only at . Writer spoke to [employer]. Pt will need to call and reserve an [employer] regulated \"carry-on\" concentrator. Pt will be able to charge during day. Concentrator has 2 rechargeable batteries, with pulse/continuous settings. It is $65.00 per week to rent. Pt will need to call and reserve, as they do go out fast.     Called and informed Rina Naik and Keanu Houston. They will call and reserve ASAP. Voiced understanding.

## 2022-10-30 DIAGNOSIS — G89.29 CHRONIC BILATERAL LOW BACK PAIN WITHOUT SCIATICA: ICD-10-CM

## 2022-10-30 DIAGNOSIS — M54.50 CHRONIC BILATERAL LOW BACK PAIN WITHOUT SCIATICA: ICD-10-CM

## 2022-10-30 DIAGNOSIS — I10 ESSENTIAL HYPERTENSION: ICD-10-CM

## 2022-10-31 RX ORDER — CARVEDILOL 12.5 MG/1
TABLET ORAL
Qty: 180 TABLET | Refills: 0 | Status: SHIPPED | OUTPATIENT
Start: 2022-10-31

## 2022-10-31 RX ORDER — AMLODIPINE BESYLATE 5 MG/1
TABLET ORAL
Qty: 180 TABLET | Refills: 0 | Status: SHIPPED | OUTPATIENT
Start: 2022-10-31

## 2022-10-31 RX ORDER — ROPINIROLE 0.25 MG/1
TABLET, FILM COATED ORAL
Qty: 90 TABLET | Refills: 1 | Status: SHIPPED | OUTPATIENT
Start: 2022-10-31

## 2022-10-31 RX ORDER — LISINOPRIL 2.5 MG/1
TABLET ORAL
Qty: 90 TABLET | Refills: 0 | Status: SHIPPED | OUTPATIENT
Start: 2022-10-31

## 2022-10-31 RX ORDER — GABAPENTIN 300 MG/1
CAPSULE ORAL
Qty: 120 CAPSULE | Refills: 0 | Status: SHIPPED | OUTPATIENT
Start: 2022-10-31 | End: 2022-11-30

## 2022-10-31 RX ORDER — HYDRALAZINE HYDROCHLORIDE 25 MG/1
TABLET, FILM COATED ORAL
Qty: 90 TABLET | Refills: 0 | Status: SHIPPED | OUTPATIENT
Start: 2022-10-31

## 2022-10-31 NOTE — TELEPHONE ENCOUNTER
Quynh Mena called requesting a refill of the below medication which has been pended for you:     Requested Prescriptions     Pending Prescriptions Disp Refills    rOPINIRole (REQUIP) 0.25 MG tablet 90 tablet 1       Last Appointment Date: 9/22/2022  Next Appointment Date: 12/29/2022    Allergies   Allergen Reactions    Atorvastatin      Other reaction(s): Muscle Pain    Codeine     Diltiazem Hcl Hives    Levofloxacin      Other reaction(s):  Intolerance-unknown    Pregabalin      lyrica    Simvastatin      Other reaction(s): Muscle Pain

## 2022-10-31 NOTE — TELEPHONE ENCOUNTER
Charmaine Woods called requesting a refill of the below medication which has been pended for you:     Requested Prescriptions     Pending Prescriptions Disp Refills    gabapentin (NEURONTIN) 300 MG capsule [Pharmacy Med Name: Gabapentin 300 MG Oral Capsule] 120 capsule 0     Sig: Take 2 capsules by mouth twice daily    lisinopril (PRINIVIL;ZESTRIL) 2.5 MG tablet [Pharmacy Med Name: Lisinopril 2.5 MG Oral Tablet] 90 tablet 0     Sig: Take 1 tablet by mouth once daily    hydrALAZINE (APRESOLINE) 25 MG tablet [Pharmacy Med Name: hydrALAZINE HCl 25 MG Oral Tablet] 90 tablet 0     Sig: Take 1/2 (one-half) tablet by mouth twice daily    carvedilol (COREG) 12.5 MG tablet [Pharmacy Med Name: Carvedilol 12.5 MG Oral Tablet] 180 tablet 0     Sig: TAKE 1 TABLET BY MOUTH TWICE DAILY WITH MEALS    amLODIPine (NORVASC) 5 MG tablet [Pharmacy Med Name: amLODIPine Besylate 5 MG Oral Tablet] 180 tablet 0     Sig: Take 1 tablet by mouth twice daily       Last Appointment Date: 9/22/2022  Next Appointment Date: 10/31/2022    Allergies   Allergen Reactions    Atorvastatin      Other reaction(s): Muscle Pain    Codeine     Diltiazem Hcl Hives    Levofloxacin      Other reaction(s):  Intolerance-unknown    Pregabalin      lyrica    Simvastatin      Other reaction(s): Muscle Pain

## 2022-11-18 ENCOUNTER — OFFICE VISIT (OUTPATIENT)
Dept: PRIMARY CARE CLINIC | Age: 87
End: 2022-11-18
Payer: MEDICARE

## 2022-11-18 VITALS
DIASTOLIC BLOOD PRESSURE: 58 MMHG | TEMPERATURE: 97.4 F | HEIGHT: 61 IN | OXYGEN SATURATION: 96 % | BODY MASS INDEX: 17.75 KG/M2 | HEART RATE: 67 BPM | SYSTOLIC BLOOD PRESSURE: 102 MMHG | WEIGHT: 94 LBS

## 2022-11-18 DIAGNOSIS — R09.81 NASAL CONGESTION: ICD-10-CM

## 2022-11-18 DIAGNOSIS — U07.1 COVID-19: Primary | ICD-10-CM

## 2022-11-18 DIAGNOSIS — R05.1 ACUTE COUGH: ICD-10-CM

## 2022-11-18 LAB
INFLUENZA A ANTIGEN, POC: NEGATIVE
INFLUENZA B ANTIGEN, POC: NEGATIVE
LOT EXPIRE DATE: ABNORMAL
LOT KIT NUMBER: ABNORMAL
SARS-COV-2, POC: DETECTED
VALID INTERNAL CONTROL: ABNORMAL
VENDOR AND KIT NAME POC: ABNORMAL

## 2022-11-18 PROCEDURE — G8427 DOCREV CUR MEDS BY ELIG CLIN: HCPCS | Performed by: NURSE PRACTITIONER

## 2022-11-18 PROCEDURE — 99213 OFFICE O/P EST LOW 20 MIN: CPT | Performed by: NURSE PRACTITIONER

## 2022-11-18 PROCEDURE — 87428 SARSCOV & INF VIR A&B AG IA: CPT | Performed by: NURSE PRACTITIONER

## 2022-11-18 PROCEDURE — 1123F ACP DISCUSS/DSCN MKR DOCD: CPT | Performed by: NURSE PRACTITIONER

## 2022-11-18 PROCEDURE — 99212 OFFICE O/P EST SF 10 MIN: CPT | Performed by: NURSE PRACTITIONER

## 2022-11-18 PROCEDURE — 1090F PRES/ABSN URINE INCON ASSESS: CPT | Performed by: NURSE PRACTITIONER

## 2022-11-18 PROCEDURE — G8484 FLU IMMUNIZE NO ADMIN: HCPCS | Performed by: NURSE PRACTITIONER

## 2022-11-18 PROCEDURE — PBSHW POCT COVID-19 & INFLUENZA A/B: Performed by: NURSE PRACTITIONER

## 2022-11-18 PROCEDURE — 1036F TOBACCO NON-USER: CPT | Performed by: NURSE PRACTITIONER

## 2022-11-18 PROCEDURE — G8419 CALC BMI OUT NRM PARAM NOF/U: HCPCS | Performed by: NURSE PRACTITIONER

## 2022-11-18 RX ORDER — FLUTICASONE PROPIONATE 50 MCG
1 SPRAY, SUSPENSION (ML) NASAL DAILY
Qty: 16 G | Refills: 0 | Status: SHIPPED | OUTPATIENT
Start: 2022-11-18

## 2022-11-18 RX ORDER — OXYCODONE AND ACETAMINOPHEN 7.5; 325 MG/1; MG/1
TABLET ORAL
COMMUNITY
Start: 2022-11-04

## 2022-11-18 ASSESSMENT — ENCOUNTER SYMPTOMS
SHORTNESS OF BREATH: 0
RHINORRHEA: 1
SORE THROAT: 0
CHEST TIGHTNESS: 0
WHEEZING: 0
GASTROINTESTINAL NEGATIVE: 1
COUGH: 1

## 2022-11-18 NOTE — PROGRESS NOTES
[facility]             [address], [address]                        Telephone [phone]             Fax [phone]     Rosana Frank  8/16/1928  ERO:[id number]   Date of visit:  11/18/2022    Subjective:    Rosana Frank is a 80 y.o.  female who presents to [facility] today (11/18/2022) for evaluation of:    Chief Complaint   Patient presents with    Cough     Bringing up yellowish/green mucous. Taking vitamin C . Sx began 3 days ago. Sinus pain and pressure and a headache. Cough  This is a new problem. The current episode started in the past 7 days (11/15/22). The problem has been unchanged. The problem occurs every few minutes. The cough is Productive of sputum (intermittently, mostly in the morning, yellow/green mucus). Associated symptoms include chills, headaches (frontal, intermittent), nasal congestion, postnasal drip, rhinorrhea and sweats (last night). Pertinent negatives include no chest pain, ear pain, fever, myalgias, rash, sore throat, shortness of breath or wheezing. Nothing aggravates the symptoms. Treatments tried: coricidin HBP. The treatment provided no relief. Her past medical history is significant for COPD.      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    Early stage nonexudative age-related macular degeneration of both eyes    Endothelial corneal dystrophy    Gastroesophageal reflux disease    Hyperlipidemia, mixed    Prediabetes    Shortness of breath    Constipation    Hyponatremia    New onset of congestive heart failure (HCC)    History of malignant melanoma of skin    COPD (chronic obstructive pulmonary disease) ([facility])    PFO (patent foramen ovale)    COVID-19    Chronic left shoulder pain    Vasovagal syncope    NSTEMI (non-ST elevated myocardial infarction) ([facility])    Opioid use, unspecified with unspecified opioid-induced disorder [facility]        Current medications are:  Current Outpatient Medications   Medication Sig Dispense Refill    fluticasone (FLONASE) 50 MCG/ACT nasal spray 1 spray by Each Nostril route daily 16 g 0    gabapentin (NEURONTIN) 300 MG capsule Take 2 capsules by mouth twice daily 120 capsule 0    lisinopril (PRINIVIL;ZESTRIL) 2.5 MG tablet Take 1 tablet by mouth once daily 90 tablet 0    hydrALAZINE (APRESOLINE) 25 MG tablet Take 1/2 (one-half) tablet by mouth twice daily 90 tablet 0    carvedilol (COREG) 12.5 MG tablet TAKE 1 TABLET BY MOUTH TWICE DAILY WITH MEALS 180 tablet 0    amLODIPine (NORVASC) 5 MG tablet Take 1 tablet by mouth twice daily 180 tablet 0    rOPINIRole (REQUIP) 0.25 MG tablet Take 1 tablet by mouth nightly 90 tablet 1    isosorbide mononitrate (IMDUR) 30 MG extended release tablet Take 1 tablet by mouth once daily 90 tablet 1    busPIRone (BUSPAR) 10 MG tablet Take 1 tablet by mouth 3 times daily as needed (anxiety) 90 tablet 1    Ferrous Sulfate (IRON) 325 (65 Fe) MG TABS TAKE 1 TABLET BY MOUTH THREE TIMES DAILY 90 tablet 5    TOBRADEX ST 0.3-0.05 % SUSP INSTILL 1 DROP INTO RIGHT EYE THREE TIMES DAILY      polyethylene glycol (GLYCOLAX) 17 g packet Take 17 g by mouth daily      oxyCODONE-acetaminophen (PERCOCET) 7.5-325 MG per tablet TAKE 1 TABLET BY MOUTH EVERY 6 HOURS AS NEEDED FOR PAIN FOR UP TO 30 DAYS.  DO NOT FILL UNTIL 11/3/22      senna (SENOKOT) 8.6 MG tablet Take 1 tablet by mouth 2 times daily as needed for Constipation (Patient not taking: Reported on 11/18/2022) 60 tablet 0    glucose monitoring (FREESTYLE FREEDOM) kit 1 kit by Does not apply route daily 1 kit 0    blood glucose monitor strips Test ONCE times day & as needed for symptoms of irregular blood glucose. Dispense sufficient amount for indicated testing frequency plus additional to accommodate PRN testing needs. 100 strip 0    Lancets MISC 1 each by Does not apply route daily 100 each 1    Handicap Placard MISC by Does not apply route Exp 3/1/2026 1 each 0    Glycerin, Laxative, (GLYCERIN ADULT) 2.1 g suppository Place 1 suppository rectally as needed (constipation) (Patient not taking: Reported on 11/18/2022) 5 suppository 0     No current facility-administered medications for this visit. She is allergic to atorvastatin, codeine, diltiazem hcl, levofloxacin, pregabalin, and simvastatin. .    She  reports that she has never smoked. She has never used smokeless tobacco.      Objective:    Vitals:    11/18/22 1038   BP: (!) 102/58   Pulse: 67   Temp: 97.4 °F (36.3 °C)   TempSrc: Tympanic   SpO2: 96%   Weight: 94 lb (42.6 kg)   Height: 5' 1\" (1.549 m)     Body mass index is 17.76 kg/m². Review of Systems   Constitutional:  Positive for chills. Negative for fever. HENT:  Positive for congestion, postnasal drip and rhinorrhea. Negative for ear pain and sore throat. Respiratory:  Positive for cough. Negative for chest tightness, shortness of breath and wheezing. Cardiovascular: Negative. Negative for chest pain. Gastrointestinal: Negative. Musculoskeletal:  Negative for myalgias. Skin:  Negative for rash. Neurological:  Positive for headaches (frontal, intermittent). Physical Exam  Vitals and nursing note reviewed. Constitutional:       Appearance: Normal appearance. She is well-developed. HENT:      Head: Normocephalic. Jaw: There is normal jaw occlusion. Right Ear: Tympanic membrane, ear canal and external ear normal.      Left Ear: Tympanic membrane, ear canal and external ear normal.      Nose: Congestion present. Right Turbinates: Swollen (erythema). Left Turbinates: Swollen (erythema).       Right Sinus: No maxillary sinus tenderness or frontal sinus tenderness. Left Sinus: No maxillary sinus tenderness or frontal sinus tenderness. Mouth/Throat:      Lips: Pink. Mouth: Mucous membranes are moist.      Pharynx: Oropharynx is clear. Uvula midline. Eyes:      Pupils: Pupils are equal, round, and reactive to light. Cardiovascular:      Rate and Rhythm: Normal rate and regular rhythm. Heart sounds: Normal heart sounds. Pulmonary:      Effort: Pulmonary effort is normal.      Breath sounds: Normal breath sounds and air entry. Comments: Dry cough noted  Musculoskeletal:      Cervical back: Normal range of motion and neck supple. Lymphadenopathy:      Cervical: No cervical adenopathy. Skin:     General: Skin is warm and dry. Neurological:      General: No focal deficit present. Mental Status: She is alert and oriented to person, place, and time. Psychiatric:         Behavior: Behavior normal.         Thought Content: Thought content normal.       Assessment and Plan:    Office Visit on 2022   Component Date Value Ref Range Status    VALID INTERNAL CONTROL 2022 pass   Final    Lot/Kit Number [id number]   Final    Lot/Kit  date: 2022 03/10/2023   Final    SARS-COV-2, POC 2022 Detected (A)  Not Detected Final    Influenza A Antigen, POC 2022 Negative  Negative Final    Influenza B Antigen, POC 2022 Negative  Negative Final    Vendor and kit name 2022 [employer]   Final          Diagnosis Orders   1. COVID-19        2. Acute cough  POCT COVID-19 & Influenza A/B      3. Nasal congestion  fluticasone (FLONASE) 50 MCG/ACT nasal spray        I recommended that she use Coricidin HBP to help with congestion and cough. I also recommended Flonase for sinus symptoms. she was also encouraged to use tylenol for pain/fever. Increase water intake. Use cool mist humidifier at bedtime. Use nasal saline flush as needed. Good hand hygiene.  she was instructed to return if there is no improvement or symptoms worsen. The use, risks, benefits, and side effects of prescribed or recommended medications were discussed. All questions were answered and the patient/caregiver voiced understanding. No orders of the defined types were placed in this encounter.         Electronically signed by KYLEE Moore CNP on 11/18/22 at 10:45 AM EST

## 2022-12-27 DIAGNOSIS — G89.29 CHRONIC BILATERAL LOW BACK PAIN WITHOUT SCIATICA: ICD-10-CM

## 2022-12-27 DIAGNOSIS — I10 ESSENTIAL HYPERTENSION: ICD-10-CM

## 2022-12-27 DIAGNOSIS — M54.50 CHRONIC BILATERAL LOW BACK PAIN WITHOUT SCIATICA: ICD-10-CM

## 2022-12-27 DIAGNOSIS — D64.9 ANEMIA, UNSPECIFIED TYPE: ICD-10-CM

## 2022-12-27 RX ORDER — CARVEDILOL 12.5 MG/1
TABLET ORAL
Qty: 180 TABLET | Refills: 1 | Status: SHIPPED | OUTPATIENT
Start: 2022-12-27

## 2022-12-27 RX ORDER — GABAPENTIN 300 MG/1
CAPSULE ORAL
Qty: 120 CAPSULE | Refills: 0 | Status: SHIPPED | OUTPATIENT
Start: 2022-12-27 | End: 2023-01-26

## 2022-12-27 RX ORDER — PNV NO.95/FERROUS FUM/FOLIC AC 28MG-0.8MG
TABLET ORAL
Qty: 270 TABLET | Refills: 1 | Status: SHIPPED | OUTPATIENT
Start: 2022-12-27

## 2022-12-27 RX ORDER — AMLODIPINE BESYLATE 5 MG/1
TABLET ORAL
Qty: 180 TABLET | Refills: 1 | Status: SHIPPED | OUTPATIENT
Start: 2022-12-27

## 2022-12-27 RX ORDER — HYDRALAZINE HYDROCHLORIDE 25 MG/1
TABLET, FILM COATED ORAL
Qty: 90 TABLET | Refills: 1 | Status: SHIPPED | OUTPATIENT
Start: 2022-12-27

## 2022-12-27 NOTE — TELEPHONE ENCOUNTER
Per [facility], last filled gabapentin 10/31/2022, #120/30 days      Praful Jefferson called requesting a refill of the below medication which has been pended for you:     Requested Prescriptions     Pending Prescriptions Disp Refills    gabapentin (NEURONTIN) 300 MG capsule [Pharmacy Med Name: Gabapentin 300 MG Oral Capsule] 120 capsule 0     Sig: Take 2 capsules by mouth twice daily    hydrALAZINE (APRESOLINE) 25 MG tablet [Pharmacy Med Name: hydrALAZINE HCl 25 MG Oral Tablet] 90 tablet 1     Sig: Take 1/2 (one-half) tablet by mouth twice daily    carvedilol (COREG) 12.5 MG tablet [Pharmacy Med Name: Carvedilol 12.5 MG Oral Tablet] 180 tablet 1     Sig: TAKE 1 TABLET BY MOUTH TWICE DAILY WITH MEALS    amLODIPine (NORVASC) 5 MG tablet [Pharmacy Med Name: amLODIPine Besylate 5 MG Oral Tablet] 180 tablet 1     Sig: Take 1 tablet by mouth twice daily    Ferrous Sulfate (IRON) 325 (65 Fe) MG TABS [Pharmacy Med Name: IRON 65MG TAB] 270 tablet 1     Sig: TAKE 1 TABLET BY MOUTH THREE TIMES DAILY       Last Appointment Date: 9/22/2022  Next Appointment Date: 12/29/2022    Allergies   Allergen Reactions    Atorvastatin      Other reaction(s): Muscle Pain    Codeine     Diltiazem Hcl Hives    Levofloxacin      Other reaction(s):  Intolerance-unknown    Pregabalin      lyrica    Simvastatin      Other reaction(s): Muscle Pain

## 2022-12-29 ENCOUNTER — OFFICE VISIT (OUTPATIENT)
Dept: FAMILY MEDICINE CLINIC | Age: 87
End: 2022-12-29
Payer: MEDICARE

## 2022-12-29 VITALS
HEIGHT: 61 IN | SYSTOLIC BLOOD PRESSURE: 118 MMHG | HEART RATE: 61 BPM | OXYGEN SATURATION: 94 % | DIASTOLIC BLOOD PRESSURE: 62 MMHG | BODY MASS INDEX: 17.33 KG/M2 | TEMPERATURE: 97.4 F | WEIGHT: 91.8 LBS

## 2022-12-29 DIAGNOSIS — H61.23 BILATERAL IMPACTED CERUMEN: ICD-10-CM

## 2022-12-29 DIAGNOSIS — M54.50 CHRONIC BILATERAL LOW BACK PAIN WITHOUT SCIATICA: ICD-10-CM

## 2022-12-29 DIAGNOSIS — G89.29 CHRONIC BILATERAL LOW BACK PAIN WITHOUT SCIATICA: ICD-10-CM

## 2022-12-29 DIAGNOSIS — I50.9 NEW ONSET OF CONGESTIVE HEART FAILURE (HCC): ICD-10-CM

## 2022-12-29 DIAGNOSIS — Z00.00 MEDICARE ANNUAL WELLNESS VISIT, SUBSEQUENT: Primary | ICD-10-CM

## 2022-12-29 DIAGNOSIS — I10 ESSENTIAL HYPERTENSION: ICD-10-CM

## 2022-12-29 PROCEDURE — 69210 REMOVE IMPACTED EAR WAX UNI: CPT | Performed by: FAMILY MEDICINE

## 2022-12-29 PROCEDURE — 99214 OFFICE O/P EST MOD 30 MIN: CPT | Performed by: FAMILY MEDICINE

## 2022-12-29 PROCEDURE — G8419 CALC BMI OUT NRM PARAM NOF/U: HCPCS | Performed by: FAMILY MEDICINE

## 2022-12-29 PROCEDURE — G8427 DOCREV CUR MEDS BY ELIG CLIN: HCPCS | Performed by: FAMILY MEDICINE

## 2022-12-29 PROCEDURE — 1123F ACP DISCUSS/DSCN MKR DOCD: CPT | Performed by: FAMILY MEDICINE

## 2022-12-29 PROCEDURE — 1036F TOBACCO NON-USER: CPT | Performed by: FAMILY MEDICINE

## 2022-12-29 PROCEDURE — 1090F PRES/ABSN URINE INCON ASSESS: CPT | Performed by: FAMILY MEDICINE

## 2022-12-29 PROCEDURE — G0439 PPPS, SUBSEQ VISIT: HCPCS | Performed by: FAMILY MEDICINE

## 2022-12-29 PROCEDURE — 99213 OFFICE O/P EST LOW 20 MIN: CPT

## 2022-12-29 PROCEDURE — G8484 FLU IMMUNIZE NO ADMIN: HCPCS | Performed by: FAMILY MEDICINE

## 2022-12-29 RX ORDER — BUSPIRONE HYDROCHLORIDE 10 MG/1
10 TABLET ORAL 3 TIMES DAILY PRN
Qty: 90 TABLET | Refills: 3 | Status: SHIPPED | OUTPATIENT
Start: 2022-12-29

## 2022-12-29 RX ORDER — OXYCODONE AND ACETAMINOPHEN 7.5; 325 MG/1; MG/1
1 TABLET ORAL EVERY 6 HOURS PRN
Qty: 120 TABLET | Refills: 0 | Status: SHIPPED | OUTPATIENT
Start: 2022-12-29 | End: 2022-12-29 | Stop reason: SDUPTHER

## 2022-12-29 RX ORDER — OXYCODONE AND ACETAMINOPHEN 7.5; 325 MG/1; MG/1
1 TABLET ORAL EVERY 6 HOURS PRN
Qty: 120 TABLET | Refills: 0 | Status: SHIPPED | OUTPATIENT
Start: 2022-12-29 | End: 2023-01-28

## 2022-12-29 SDOH — ECONOMIC STABILITY: FOOD INSECURITY: WITHIN THE PAST 12 MONTHS, THE FOOD YOU BOUGHT JUST DIDN'T LAST AND YOU DIDN'T HAVE MONEY TO GET MORE.: PATIENT DECLINED

## 2022-12-29 SDOH — ECONOMIC STABILITY: FOOD INSECURITY: WITHIN THE PAST 12 MONTHS, YOU WORRIED THAT YOUR FOOD WOULD RUN OUT BEFORE YOU GOT MONEY TO BUY MORE.: PATIENT DECLINED

## 2022-12-29 ASSESSMENT — PATIENT HEALTH QUESTIONNAIRE - PHQ9
SUM OF ALL RESPONSES TO PHQ QUESTIONS 1-9: 0
SUM OF ALL RESPONSES TO PHQ QUESTIONS 1-9: 0
SUM OF ALL RESPONSES TO PHQ9 QUESTIONS 1 & 2: 0
1. LITTLE INTEREST OR PLEASURE IN DOING THINGS: 0
SUM OF ALL RESPONSES TO PHQ QUESTIONS 1-9: 0
SUM OF ALL RESPONSES TO PHQ QUESTIONS 1-9: 0
2. FEELING DOWN, DEPRESSED OR HOPELESS: 0

## 2022-12-29 ASSESSMENT — ENCOUNTER SYMPTOMS
ABDOMINAL PAIN: 0
COUGH: 0
WHEEZING: 0
CHEST TIGHTNESS: 0
SHORTNESS OF BREATH: 0

## 2022-12-29 ASSESSMENT — LIFESTYLE VARIABLES
HOW MANY STANDARD DRINKS CONTAINING ALCOHOL DO YOU HAVE ON A TYPICAL DAY: PATIENT DOES NOT DRINK
HOW OFTEN DO YOU HAVE A DRINK CONTAINING ALCOHOL: NEVER

## 2022-12-29 ASSESSMENT — SOCIAL DETERMINANTS OF HEALTH (SDOH): HOW HARD IS IT FOR YOU TO PAY FOR THE VERY BASICS LIKE FOOD, HOUSING, MEDICAL CARE, AND HEATING?: PATIENT DECLINED

## 2022-12-29 NOTE — PROGRESS NOTES
ASIYA Rodriguez [address]  [address] [address]  Dept: [phone]  Dept Fax: [phone]  Loc: [phone]    Jacqui Landaverde is a 80 y.o. female who presents today for her medical conditions/complaints as noted below. Jacqui Landaverde is c/o of   Chief Complaint   Patient presents with    Medicare AWV    Back Pain     3 month       HPI:     HPI Here today for her [address] and back pain    Back pain: she has been doing well overall. She can tell that the percocet is still helping her back. No issues with constipation. She is trying to walk in the house. She fell at [facility] when she missed the car door getting in the car. She did not get seriously injured. HTN: stable; no issues with lightheadedness or dizziness when she stands up. No issues with chest pain or shortness of breath. No issues with leg swelling. She gets an occasional headache but not often. Anxiety: stable; she has some days that are better than others. She tends to get irritable towards her daughter and the buspar really helps. She is sleeping well. Her appetite is excellent. 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  Max Daily Amount: 4 tablets 120 tablet 0    gabapentin (NEURONTIN) 300 MG capsule Take 2 capsules by mouth twice daily 120 capsule 0    hydrALAZINE (APRESOLINE) 25 MG tablet Take 1/2 (one-half) tablet by mouth twice daily 90 tablet 1    carvedilol (COREG) 12.5 MG tablet TAKE 1 TABLET BY MOUTH TWICE DAILY WITH MEALS 180 tablet 1    amLODIPine (NORVASC) 5 MG tablet Take 1 tablet by mouth twice daily 180 tablet 1    Ferrous Sulfate (IRON) 325 (65 Fe) MG TABS TAKE 1 TABLET BY MOUTH THREE TIMES DAILY 270 tablet 1    fluticasone (FLONASE) 50 MCG/ACT nasal spray 1 spray by Each Nostril route daily 16 g 0    lisinopril (PRINIVIL;ZESTRIL) 2.5 MG tablet Take 1 tablet by mouth once daily 90 tablet 0    rOPINIRole (REQUIP) 0.25 MG tablet Take 1 tablet by mouth nightly 90 tablet 1    senna (SENOKOT) 8.6 MG tablet Take 1 tablet by mouth 2 times daily as needed for Constipation 60 tablet 0    isosorbide mononitrate (IMDUR) 30 MG extended release tablet Take 1 tablet by mouth once daily 90 tablet 1    TOBRADEX ST 0.3-0.05 % SUSP INSTILL 1 DROP INTO RIGHT EYE THREE TIMES DAILY      glucose monitoring (FREESTYLE FREEDOM) kit 1 kit by Does not apply route daily 1 kit 0    blood glucose monitor strips Test ONCE times day & as needed for symptoms of irregular blood glucose. Dispense sufficient amount for indicated testing frequency plus additional to accommodate PRN testing needs. 100 strip 0    Lancets MISC 1 each by Does not apply route daily 100 each 1    Handicap Placard MISC by Does not apply route Exp 3/1/2026 1 each 0    Glycerin, Laxative, (GLYCERIN ADULT) 2.1 g suppository Place 1 suppository rectally as needed (constipation) 5 suppository 0    polyethylene glycol (GLYCOLAX) 17 g packet Take 17 g by mouth daily       No current facility-administered medications for this visit. Allergies   Allergen Reactions    Atorvastatin      Other reaction(s): Muscle Pain    Codeine     Diltiazem Hcl Hives    Levofloxacin      Other reaction(s):  Intolerance-unknown    Pregabalin      lyrica    Simvastatin      Other reaction(s): Muscle Pain       Subjective:     Review of Systems   Constitutional:  Negative for activity change, appetite change, chills, fatigue and fever. Eyes:  Negative for visual disturbance. Respiratory:  Negative for cough, chest tightness, shortness of breath and wheezing. Cardiovascular:  Negative for chest pain, palpitations and leg swelling. Gastrointestinal:  Negative for abdominal pain. Genitourinary:  Negative for difficulty urinating. Musculoskeletal:  Positive for arthralgias. Neurological:  Negative for dizziness, syncope, weakness, light-headedness and headaches. Psychiatric/Behavioral:  Negative for dysphoric mood and sleep disturbance. The patient is nervous/anxious (buspar really helps). Objective:      Physical Exam  Vitals and nursing note reviewed. Constitutional:       General: She is not in acute distress. Appearance: She is well-developed. HENT:      Right Ear: Tympanic membrane, ear canal and external ear normal.      Left Ear: Tympanic membrane, ear canal and external ear normal.   Eyes:      Conjunctiva/sclera: Conjunctivae normal.   Neck:      Thyroid: No thyromegaly. Cardiovascular:      Rate and Rhythm: Regular rhythm. Heart sounds: Normal heart sounds. No murmur heard. Pulmonary:      Effort: Pulmonary effort is normal. No respiratory distress. Breath sounds: Normal breath sounds. No wheezing. Musculoskeletal:      Cervical back: Normal range of motion and neck supple. Lymphadenopathy:      Cervical: No cervical adenopathy. Skin:     General: Skin is warm and dry. Findings: No erythema or rash. Neurological:      Mental Status: She is alert and oriented to person, place, and time. Psychiatric:         Mood and Affect: Mood normal.         Behavior: Behavior normal.         Thought Content:  Thought content normal.         Judgment: Judgment normal.     /62   Pulse 61   Temp 97.4 °F (36.3 °C)   Ht 5' 1\" (1.549 m) Wt 91 lb 12.8 oz (41.6 kg)   SpO2 94%   BMI 17.35 kg/m²     Assessment:       Diagnosis Orders   1. Medicare annual wellness visit, subsequent        2. Chronic bilateral low back pain without sciatica  oxyCODONE-acetaminophen (PERCOCET) 7.5-325 MG per tablet    DISCONTINUED: oxyCODONE-acetaminophen (PERCOCET) 7.5-325 MG per tablet    DISCONTINUED: oxyCODONE-acetaminophen (PERCOCET) 7.5-325 MG per tablet      3. Essential hypertension        4. New onset of congestive heart failure ([facility] [address]        5. Bilateral impacted cerumen  RI REMOVAL IMPACTED CERUMEN INSTRUMENTATION UNILAT                Plan:       MWV: see other note    Back pain: stable; she has been doing well with her percocet. She is getting the relief that she wants and she is not having any issues with side effects. I will continue her current dose. She was given a 3 months' supply. Controlled Substance Monitoring:    Acute and Chronic Pain Monitoring:   RX Monitoring 12/29/2022   Attestation -   Periodic Controlled Substance Monitoring Possible medication side effects, risk of tolerance/dependence & alternative treatments discussed. ;No signs of potential drug abuse or diversion identified. ;Assessed functional status. ;Obtaining appropriate analgesic effect of treatment. Chronic Pain > 50 MEDD Considered consultation with a specialist.   Chronic Pain > 80 MEDD Obtained or confirmed \"Medication Contract\" on file. HTN: stable; her blood pressure is well controlled so I will continue her current medications. CHF: stable; no signs of fluid overload or swelling so I will just continue to monitor    Cerumen impaction: new; likely contributing to hearing loss    Ceruminosis is noted. Wax is removed by syringing and manual debridement She could not tolerate the irrigation so I recommended debrox and directing the shower towards her ear. . Instructions for home care to prevent wax buildup are given.       Return in 3 months (on 3/29/2023) for Back pain follow up. Orders Placed This Encounter   Procedures    OH REMOVAL IMPACTED CERUMEN INSTRUMENTATION UNILAT       Orders Placed This Encounter   Medications    busPIRone (BUSPAR) 10 MG tablet     Sig: Take 1 tablet by mouth 3 times daily as needed (anxiety)     Dispense:  90 tablet     Refill:  3    DISCONTD: oxyCODONE-acetaminophen (PERCOCET) 7.5-325 MG per tablet     Sig: Take 1 tablet by mouth every 6 hours as needed for Pain for up to 30 days. Max Daily Amount: 4 tablets     Dispense:  120 tablet     Refill:  0     Reduce doses taken as pain becomes manageable    DISCONTD: oxyCODONE-acetaminophen (PERCOCET) 7.5-325 MG per tablet     Sig: Take 1 tablet by mouth every 6 hours as needed for Pain for up to 30 days. Max Daily Amount: 4 tablets     Dispense:  120 tablet     Refill:  0     Do not fill until 2/3/23    oxyCODONE-acetaminophen (PERCOCET) 7.5-325 MG per tablet     Sig: Take 1 tablet by mouth every 6 hours as needed for Pain for up to 30 days. Max Daily Amount: 4 tablets     Dispense:  120 tablet     Refill:  0     Do not fill until 3/3/23         Patientgiven educational materials - see patient instructions. Discussed use, benefit,and side effects of prescribed medications. All patient questions answered. Ptvoiced understanding. Reviewed health maintenance. Instructed to continue currentmedications, diet and exercise. Patient agreed with treatment plan. Follow up asdirected.      Electronically signed by Tracy Jansen MD on 12/29/2022 at 5:28 PM

## 2022-12-29 NOTE — PROGRESS NOTES
days  Have you lost any weight without trying in the past 3 months?: No  Body mass index: (!) 17.34    Underweight Interventions:  She was encouraged to drink a boost every days. Dentist Screen:  Have you seen the dentist within the past year?: (!) No    Intervention:  Advised to schedule with their dentist       ADL's:   Patient reports needing help with:  Select all that apply: (!) Transportation  Interventions:  Patient declined any further interventions or treatment                Objective   Vitals:    12/29/22 0819   BP: 118/62   Pulse: 61   Temp: 97.4 °F (36.3 °C)   SpO2: 94%   Weight: 91 lb 12.8 oz (41.6 kg)   Height: 5' 1\" (1.549 m)      Body mass index is 17.35 kg/m². Allergies   Allergen Reactions    Atorvastatin      Other reaction(s): Muscle Pain    Codeine     Diltiazem Hcl Hives    Levofloxacin      Other reaction(s): Intolerance-unknown    Pregabalin      lyrica    Simvastatin      Other reaction(s): Muscle Pain     Prior to Visit Medications    Medication Sig Taking? Authorizing Provider   gabapentin (NEURONTIN) 300 MG capsule Take 2 capsules by mouth twice daily Yes Joslyn Saint, MD   hydrALAZINE (APRESOLINE) 25 MG tablet Take 1/2 (one-half) tablet by mouth twice daily Yes Joslyn Saint, MD   carvedilol (COREG) 12.5 MG tablet TAKE 1 TABLET BY MOUTH TWICE DAILY WITH MEALS Yes Joslyn Saint, MD   amLODIPine (NORVASC) 5 MG tablet Take 1 tablet by mouth twice daily Yes Joslyn Saint, MD   Ferrous Sulfate (IRON) 325 (65 Fe) MG TABS TAKE 1 TABLET BY MOUTH THREE TIMES DAILY Yes Joslyn Saint, MD   oxyCODONE-acetaminophen (PERCOCET) 7.5-325 MG per tablet TAKE 1 TABLET BY MOUTH EVERY 6 HOURS AS NEEDED FOR PAIN FOR UP TO 30 DAYS.  DO NOT FILL UNTIL 11/3/22 Yes Historical Provider, MD   fluticasone (FLONASE) 50 MCG/ACT nasal spray 1 spray by Each Nostril route daily Yes KYLEE Anaya - CNP   lisinopril (PRINIVIL;ZESTRIL) 2.5 MG tablet Take 1 tablet by mouth once daily Yes Rodrigo Rosa MD   rOPINIRole (REQUIP) 0.25 MG tablet Take 1 tablet by mouth nightly Yes Rodrigo Rosa MD   senna (SENOKOT) 8.6 MG tablet Take 1 tablet by mouth 2 times daily as needed for Constipation Yes Rodrigo Rosa MD   isosorbide mononitrate (IMDUR) 30 MG extended release tablet Take 1 tablet by mouth once daily Yes Rodrigo Rosa MD   busPIRone (BUSPAR) 10 MG tablet Take 1 tablet by mouth 3 times daily as needed (anxiety) Yes Rodrigo Rosa MD   TOBRADEX ST 0.3-0.05 % SUSP INSTILL 1 DROP INTO RIGHT EYE THREE TIMES DAILY Yes Historical Provider, MD   glucose monitoring (FREESTYLE FREEDOM) kit 1 kit by Does not apply route daily Yes Rodrigo Rosa MD   blood glucose monitor strips Test ONCE times day & as needed for symptoms of irregular blood glucose. Dispense sufficient amount for indicated testing frequency plus additional to accommodate PRN testing needs.  Yes Rodrigo Rosa MD   Lancets MISC 1 each by Does not apply route daily Yes Rodrigo Rosa MD   Handicap Placard MISC by Does not apply route Exp 3/1/2026 Yes Rodrigo Rosa MD   Glycerin, Laxative, (GLYCERIN ADULT) 2.1 g suppository Place 1 suppository rectally as needed (constipation) Yes Rodrigo Rosa MD   polyethylene glycol (GLYCOLAX) 17 g packet Take 17 g by mouth daily Yes Historical Provider, MD       CareTeam (Including outside providers/suppliers regularly involved in providing care):   Patient Care Team:  Rodrigo Rosa MD as PCP - General (Family Medicine)  Rodrigo Rosa MD [facility]  Gerhard Sandy MD as Consulting Physician (Nephrology)     Reviewed and updated this visit:  Tobacco  Allergies  Meds  Problems  Med Hx  Surg Hx  Soc Hx  Fam Hx

## 2022-12-29 NOTE — PATIENT INSTRUCTIONS
Learning About Dental Care for Older Adults  Dental care for older adults: Overview  Dental care for older people is much the same as for younger adults. But older adults do have concerns that younger adults do not. Older adults may have problems with gum disease and decay on the roots of their teeth. They may need missing teeth replaced or broken fillings fixed. Or they may have dentures that need to be cared for. Some older adults may have trouble holding a toothbrush. You can help remind the person you are caring for to brush and floss their teeth or to clean their dentures. In some cases, you may need to do the brushing and other dental care tasks. People who have trouble using their hands or who have dementia may need this extra help. How can you help with dental care? Normal dental care  To keep the teeth and gums healthy:  Brush the teeth with fluoride toothpaste twice a day--in the morning and at night--and floss at least once a day. Plaque can quickly build up on the teeth of older adults. Watch for the signs of gum disease. These signs include gums that bleed after brushing or after eating hard foods, such as apples. See a dentist regularly. Many experts recommend checkups every 6 months. Keep the dentist up to date on any new medications the person is taking. Encourage a balanced diet that includes whole grains, vegetables, and fruits, and that is low in saturated fat and sodium. Encourage the person you're caring for not to use tobacco products. They can affect dental and general health. Many older adults have a fixed income and feel that they can't afford dental care. But most [facility] and [facility] have programs in which dentists help older adults by lowering fees. Contact your area's public health offices or  for information about dental care in your area.   Using a toothbrush  Older adults with arthritis sometimes have trouble brushing their teeth because they can't easily hold the toothbrush. Their hands and fingers may be stiff, painful, or weak. If this is the case, you can: Offer an electric toothbrush. Enlarge the handle of a non-electric toothbrush by wrapping a sponge, an elastic bandage, or adhesive tape around it. Push the toothbrush handle through a ball made of rubber or soft foam.  Make the handle longer and thicker by taping Popsicle sticks or tongue depressors to it. You may also be able to buy special toothbrushes, toothpaste dispensers, and floss holders. Your doctor may recommend a soft-bristle toothbrush if the person you care for bleeds easily. Bleeding can happen because of a health problem or from certain medicines. A toothpaste for sensitive teeth may help if the person you care for has sensitive teeth. How do you brush and floss someone's teeth? If the person you are caring for has a hard time cleaning their teeth on their own, you may need to brush and floss their teeth for them. It may be easiest to have the person sit and face away from you, and to sit or stand behind them. That way you can steady their head against your arm as you reach around to floss and brush their teeth. Choose a place that has good lighting and is comfortable for both of you. Before you begin, gather your supplies. You will need gloves, floss, a toothbrush, and a container to hold water if you are not near a sink. Wash and dry your hands well and put on gloves. Start by flossing:  Gently work a piece of floss between each of the teeth toward the gums. A plastic flossing tool may make this easier, and they are available at [facility]. Curve the floss around each tooth into a U-shape and gently slide it under the gum line. Move the floss firmly up and down several times to scrape off the plaque. After you've finished flossing, throw away the used floss and begin brushing:  Wet the brush and apply toothpaste. Place the brush at a 45-degree angle where the teeth meet the gums. Press firmly, and move the brush in small circles over the surface of the teeth. Be careful not to brush too hard. Vigorous brushing can make the gums pull away from the teeth and can scratch the tooth enamel. Brush all surfaces of the teeth, on the tongue side and on the cheek side. Pay special attention to the front teeth and all surfaces of the back teeth. Brush chewing surfaces with short back-and-forth strokes. After you've finished, help the person rinse the remaining toothpaste from their mouth. Where can you learn more? Go to http://www.woods.com/ and enter F944 to learn more about \"Learning About Dental Care for Older Adults. \"  Current as of: June 16, 2022               Content Version: 13.5  © 2006-2022 Healthwise, [employer]. Care instructions adapted under license by [facility] ([facility]). If you have questions about a medical condition or this instruction, always ask your healthcare professional. [place] [address] any warranty or liability for your use of this information. Learning About Activities of Daily Living  What are activities of daily living? Activities of daily living (ADLs) are the basic self-care tasks you do every day. As you age, and if you have health problems, you may find that it's harder to do these things for yourself. That's when you may need some help. Your doctor uses ADLs to measure how much help you need. Knowing what you can and can't do for yourself is an important first step to getting help. And when you have the help you need, you can stay as independent as possible. Your doctor will want to know if you are able to do tasks such as: Take a bath or shower without help. Go to the bathroom by yourself. Dress and undress without help. Shave, comb your hair, and brush teeth on your own. Get in and out of bed or a chair without help. Feed yourself without help.   If you are having trouble doing basic self-care tasks, talk with your doctor. You may want to bring a caregiver or family member who can help the doctor understand your needs and abilities. How will a doctor assess your ADLs? Asking about ADLs is part of a routine health checkup your doctor will likely do as you age. Your health check might be done in a doctor's office, in your home, or at a hospital. The goal is to find out if you are having any problems that could make your health problems worse or that make it unsafe for you to be on your own. To measure your ADLs, your doctor will ask how hard it is for you to do routine tasks. He or she may also want to know if you have changed the way you do a task because of a health problem. He or she may watch how you:  Walk back and forth. Keep your balance while you stand or walk. Move from sitting to standing or from a bed to a chair. Button or unbutton a shirt or sweater. Remove and put on your shoes. It's normal to feel a little worried or anxious if you find you can't do all the things you used to be able to do. Talking with your doctor about ADLs isn't a test that you either pass or fail. It's just a way to get more information about your health and safety. Follow-up care is a key part of your treatment and safety. Be sure to make and go to all appointments, and call your doctor if you are having problems. It's also a good idea to know your test results and keep a list of the medicines you take. Current as of: October 6, 2021               Content Version: 13.5  © 2006-2022 Healthwise, [employer]. Care instructions adapted under license by [facility] ([facility]). If you have questions about a medical condition or this instruction, always ask your healthcare professional. [place] [address] any warranty or liability for your use of this information. Advance Directives: Care Instructions  Overview  An advance directive is a legal way to state your wishes at the end of your life.  It tells your family and your doctor what to do if you can't say what you want. There are two main types of advance directives. You can change them any time your wishes change. Living will. This form tells your family and your doctor your wishes about life support and other treatment. The form is also called a declaration. Medical power of . This form lets you name a person to make treatment decisions for you when you can't speak for yourself. This person is called a health care agent (health care proxy, health care surrogate). The form is also called a durable power of  for health care. If you do not have an advance directive, decisions about your medical care may be made by a family member, or by a doctor or a  who doesn't know you. It may help to think of an advance directive as a gift to the people who care for you. If you have one, they won't have to make tough decisions by themselves. For more information, including forms for your state, see the [address] website (www.caringinfo.org/planning/advance-directives/). Follow-up care is a key part of your treatment and safety. Be sure to make and go to all appointments, and call your doctor if you are having problems. It's also a good idea to know your test results and keep a list of the medicines you take. What should you include in an advance directive? Many states have a unique advance directive form. (It may ask you to address specific issues.) Or you might use a universal form that's approved by many states. If your form doesn't tell you what to address, it may be hard to know what to include in your advance directive. Use the questions below to help you get started. Who do you want to make decisions about your medical care if you are not able to? What life-support measures do you want if you have a serious illness that gets worse over time or can't be cured? What are you most afraid of that might happen?  (Maybe you're afraid of having pain, losing your independence, or being kept alive by machines.)  Where would you prefer to die? (Your home? A hospital? A nursing home?)  Do you want to donate your organs when you die? Do you want certain Jewish practices performed before you die? When should you call for help? Be sure to contact your doctor if you have any questions. Where can you learn more? Go to http://www.salinas.com/ and enter R264 to learn more about \"Advance Directives: Care Instructions. \"  Current as of: June 16, 2022               Content Version: 13.5  © 8410-8303 Healthwise, [employer]. Care instructions adapted under license by [facility])[email or web address] [facility] ([facility]). If you have questions about a medical condition or this instruction, always ask your healthcare professional. [address] any warranty or liability for your use of this information. Personalized Preventive Plan for Jessenia Ibarra - 12/29/2022  Medicare offers a range of preventive health benefits. Some of the tests and screenings are paid in full while other may be subject to a deductible, co-insurance, and/or copay. Some of these benefits include a comprehensive review of your medical history including lifestyle, illnesses that may run in your family, and various assessments and screenings as appropriate. After reviewing your medical record and screening and assessments performed today your provider may have ordered immunizations, labs, imaging, and/or referrals for you. A list of these orders (if applicable) as well as your Preventive Care list are included within your After Visit Summary for your review. Other Preventive Recommendations:    A preventive eye exam performed by an eye specialist is recommended every 1-2 years to screen for glaucoma; cataracts, macular degeneration, and other eye disorders. A preventive dental visit is recommended every 6 months.   Try to get at least 150 minutes of exercise per week or 10,000 steps per day on a pedometer . Order or download the FREE \"Exercise & Physical Activity: Your Everyday Guide\" from [employer]. Call 7-239.552.8435 or search [employer] online. You need 8004-4307 mg of calcium and 2624-2377 IU of vitamin D per day. It is possible to meet your calcium requirement with diet alone, but a vitamin D supplement is usually necessary to meet this goal.  When exposed to the sun, use a sunscreen that protects against both UVA and UVB radiation with an SPF of 30 or greater. Reapply every 2 to 3 hours or after sweating, drying off with a towel, or swimming. Always wear a seat belt when traveling in a car. Always wear a helmet when riding a bicycle or motorcycle.

## 2023-01-03 ENCOUNTER — TELEPHONE (OUTPATIENT)
Dept: FAMILY MEDICINE CLINIC | Age: 88
End: 2023-01-03

## 2023-01-03 NOTE — TELEPHONE ENCOUNTER
Per Patricia Jorgensen, they have not tried tylenol or motrin due to being on Percocet. Although, she did state that after she ate her HA seemed to resolve. Declined any other symptoms-fever, body aches, chills. B/P was not a concern. They would like to know for future reference what she could take.

## 2023-01-03 NOTE — TELEPHONE ENCOUNTER
Granddaughter calling stating pt is on Percocet and has a headache and questions what she can take for the headache, please advise at above number.

## 2023-02-01 RX ORDER — AMLODIPINE BESYLATE 5 MG/1
TABLET ORAL
Qty: 180 TABLET | Refills: 1 | OUTPATIENT
Start: 2023-02-01

## 2023-02-01 RX ORDER — ISOSORBIDE MONONITRATE 30 MG/1
TABLET, EXTENDED RELEASE ORAL
Qty: 90 TABLET | Refills: 1 | OUTPATIENT
Start: 2023-02-01

## 2023-02-01 RX ORDER — LISINOPRIL 2.5 MG/1
TABLET ORAL
Qty: 90 TABLET | Refills: 1 | Status: SHIPPED | OUTPATIENT
Start: 2023-02-01

## 2023-02-01 RX ORDER — ISOSORBIDE MONONITRATE 30 MG/1
TABLET, EXTENDED RELEASE ORAL
Qty: 90 TABLET | Refills: 1 | Status: SHIPPED | OUTPATIENT
Start: 2023-02-01

## 2023-02-01 NOTE — TELEPHONE ENCOUNTER
Lara called requesting a refill of the below medication which has been pended for you:     Requested Prescriptions     Pending Prescriptions Disp Refills    amLODIPine (NORVASC) 5 MG tablet 180 tablet 1    isosorbide mononitrate (IMDUR) 30 MG extended release tablet 90 tablet 1       Last Appointment Date: 12/29/2022  Next Appointment Date: 3/31/2023    Allergies   Allergen Reactions    Atorvastatin      Other reaction(s): Muscle Pain    Codeine     Diltiazem Hcl Hives    Levofloxacin      Other reaction(s): Intolerance-unknown    Pregabalin      lyrica    Simvastatin      Other reaction(s): Muscle Pain

## 2023-02-01 NOTE — TELEPHONE ENCOUNTER
Eleazar Verdugo called requesting a refill of the below medication which has been pended for you:     Requested Prescriptions     Pending Prescriptions Disp Refills    isosorbide mononitrate (IMDUR) 30 MG extended release tablet [Pharmacy Med Name: Isosorbide Mononitrate ER 30 MG Oral Tablet Extended Release 24 Hour] 90 tablet 0     Sig: Take 1 tablet by mouth once daily    lisinopril (PRINIVIL;ZESTRIL) 2.5 MG tablet [Pharmacy Med Name: Lisinopril 2.5 MG Oral Tablet] 90 tablet 0     Sig: Take 1 tablet by mouth once daily       Last Appointment Date: 12/29/2022  Next Appointment Date: 3/31/2023    Allergies   Allergen Reactions    Atorvastatin      Other reaction(s): Muscle Pain    Codeine     Diltiazem Hcl Hives    Levofloxacin      Other reaction(s):  Intolerance-unknown    Pregabalin      lyrica    Simvastatin      Other reaction(s): Muscle Pain

## 2023-02-06 DIAGNOSIS — G89.29 CHRONIC BILATERAL LOW BACK PAIN WITHOUT SCIATICA: ICD-10-CM

## 2023-02-06 DIAGNOSIS — M54.50 CHRONIC BILATERAL LOW BACK PAIN WITHOUT SCIATICA: ICD-10-CM

## 2023-02-06 RX ORDER — GABAPENTIN 300 MG/1
CAPSULE ORAL
Qty: 120 CAPSULE | Refills: 0 | Status: SHIPPED | OUTPATIENT
Start: 2023-02-06 | End: 2023-03-08

## 2023-02-06 NOTE — TELEPHONE ENCOUNTER
Dr Huy Navarro patient. Dr Huy Navarro is out of the office. Meryl Faria called requesting a refill of the below medication which has been pended for you: per [facility], last gabapentin fill 12/27/22 #120. Requested Prescriptions     Pending Prescriptions Disp Refills    gabapentin (NEURONTIN) 300 MG capsule [Pharmacy Med Name: Gabapentin 300 MG Oral Capsule] 120 capsule 0     Sig: Take 2 capsules by mouth twice daily       Last Appointment Date: 12/29/2022  Next Appointment Date: 3/31/2023    Allergies   Allergen Reactions    Atorvastatin      Other reaction(s): Muscle Pain    Codeine     Diltiazem Hcl Hives    Levofloxacin      Other reaction(s):  Intolerance-unknown    Pregabalin      lyrica    Simvastatin      Other reaction(s): Muscle Pain

## 2023-03-16 ENCOUNTER — HOSPITAL ENCOUNTER (OUTPATIENT)
Age: 88
Discharge: HOME | End: 2023-03-16
Payer: MEDICARE

## 2023-03-16 ENCOUNTER — OFFICE VISIT (OUTPATIENT)
Dept: FAMILY MEDICINE CLINIC | Age: 88
End: 2023-03-16
Payer: MEDICARE

## 2023-03-16 VITALS
BODY MASS INDEX: 17.5 KG/M2 | TEMPERATURE: 98.4 F | HEIGHT: 61 IN | DIASTOLIC BLOOD PRESSURE: 70 MMHG | SYSTOLIC BLOOD PRESSURE: 120 MMHG | HEART RATE: 81 BPM | WEIGHT: 92.7 LBS | OXYGEN SATURATION: 94 %

## 2023-03-16 DIAGNOSIS — R53.82 CHRONIC FATIGUE: ICD-10-CM

## 2023-03-16 DIAGNOSIS — I10 ESSENTIAL HYPERTENSION: ICD-10-CM

## 2023-03-16 DIAGNOSIS — E55.9 VITAMIN D DEFICIENCY: ICD-10-CM

## 2023-03-16 DIAGNOSIS — F11.99 OPIOID USE, UNSPECIFIED WITH UNSPECIFIED OPIOID-INDUCED DISORDER (HCC): ICD-10-CM

## 2023-03-16 DIAGNOSIS — I10 ESSENTIAL HYPERTENSION: Primary | ICD-10-CM

## 2023-03-16 DIAGNOSIS — I50.9 NEW ONSET OF CONGESTIVE HEART FAILURE (HCC): ICD-10-CM

## 2023-03-16 DIAGNOSIS — J41.8 MIXED SIMPLE AND MUCOPURULENT CHRONIC BRONCHITIS (HCC): ICD-10-CM

## 2023-03-16 LAB
25(OH)D3 SERPL-MCNC: 50 NG/ML
ABSOLUTE EOS #: 0.1 K/UL (ref 0–0.44)
ABSOLUTE IMMATURE GRANULOCYTE: <0.03 K/UL (ref 0–0.3)
ABSOLUTE LYMPH #: 1.96 K/UL (ref 1.1–3.7)
ABSOLUTE MONO #: 0.64 K/UL (ref 0.1–1.2)
ALBUMIN SERPL-MCNC: 4 G/DL (ref 3.5–5.2)
ALBUMIN/GLOBULIN RATIO: 1.2 (ref 1–2.5)
ALP SERPL-CCNC: 85 U/L (ref 35–104)
ALT SERPL-CCNC: 16 U/L (ref 5–33)
ANION GAP SERPL CALCULATED.3IONS-SCNC: 9 MMOL/L (ref 9–17)
AST SERPL-CCNC: 23 U/L
BASOPHILS # BLD: 1 % (ref 0–2)
BASOPHILS ABSOLUTE: 0.04 K/UL (ref 0–0.2)
BILIRUB SERPL-MCNC: 0.3 MG/DL (ref 0.3–1.2)
BUN SERPL-MCNC: 18 MG/DL (ref 8–23)
BUN/CREAT BLD: 20 (ref 9–20)
CALCIUM SERPL-MCNC: 9.4 MG/DL (ref 8.6–10.4)
CHLORIDE SERPL-SCNC: 99 MMOL/L (ref 98–107)
CO2 SERPL-SCNC: 28 MMOL/L (ref 20–31)
CREAT SERPL-MCNC: 0.89 MG/DL (ref 0.5–0.9)
EOSINOPHILS RELATIVE PERCENT: 1 % (ref 1–4)
GFR SERPL CREATININE-BSD FRML MDRD: >60 ML/MIN/1.73M2
GLUCOSE SERPL-MCNC: 121 MG/DL (ref 70–99)
HCT VFR BLD AUTO: 38.6 % (ref 36.3–47.1)
HGB BLD-MCNC: 12 G/DL (ref 11.9–15.1)
IMMATURE GRANULOCYTES: 0 %
LYMPHOCYTES # BLD: 24 % (ref 24–43)
MCH RBC QN AUTO: 31.1 PG (ref 25.2–33.5)
MCHC RBC AUTO-ENTMCNC: 31.1 G/DL (ref 25.2–33.5)
MCV RBC AUTO: 100 FL (ref 82.6–102.9)
MONOCYTES # BLD: 8 % (ref 3–12)
NRBC AUTOMATED: 0 PER 100 WBC
PDW BLD-RTO: 13.4 % (ref 11.8–14.4)
PLATELET # BLD AUTO: 244 K/UL (ref 138–453)
PMV BLD AUTO: 10.8 FL (ref 8.1–13.5)
POTASSIUM SERPL-SCNC: 5 MMOL/L (ref 3.7–5.3)
PROT SERPL-MCNC: 7.4 G/DL (ref 6.4–8.3)
RBC # BLD: 3.86 M/UL (ref 3.95–5.11)
SEG NEUTROPHILS: 66 % (ref 36–65)
SEGMENTED NEUTROPHILS ABSOLUTE COUNT: 5.33 K/UL (ref 1.5–8.1)
SODIUM SERPL-SCNC: 136 MMOL/L (ref 135–144)
TSH SERPL-ACNC: 1.77 UIU/ML (ref 0.3–5)
WBC # BLD AUTO: 8.1 K/UL (ref 3.5–11.3)

## 2023-03-16 PROCEDURE — G8419 CALC BMI OUT NRM PARAM NOF/U: HCPCS | Performed by: FAMILY MEDICINE

## 2023-03-16 PROCEDURE — 3023F SPIROM DOC REV: CPT | Performed by: FAMILY MEDICINE

## 2023-03-16 PROCEDURE — 1036F TOBACCO NON-USER: CPT | Performed by: FAMILY MEDICINE

## 2023-03-16 PROCEDURE — G8484 FLU IMMUNIZE NO ADMIN: HCPCS | Performed by: FAMILY MEDICINE

## 2023-03-16 PROCEDURE — 99214 OFFICE O/P EST MOD 30 MIN: CPT | Performed by: FAMILY MEDICINE

## 2023-03-16 PROCEDURE — 84443 ASSAY THYROID STIM HORMONE: CPT

## 2023-03-16 PROCEDURE — G8427 DOCREV CUR MEDS BY ELIG CLIN: HCPCS | Performed by: FAMILY MEDICINE

## 2023-03-16 PROCEDURE — 85025 COMPLETE CBC W/AUTO DIFF WBC: CPT

## 2023-03-16 PROCEDURE — 99212 OFFICE O/P EST SF 10 MIN: CPT

## 2023-03-16 PROCEDURE — 1090F PRES/ABSN URINE INCON ASSESS: CPT | Performed by: FAMILY MEDICINE

## 2023-03-16 PROCEDURE — 36415 COLL VENOUS BLD VENIPUNCTURE: CPT

## 2023-03-16 PROCEDURE — 1123F ACP DISCUSS/DSCN MKR DOCD: CPT | Performed by: FAMILY MEDICINE

## 2023-03-16 PROCEDURE — 80053 COMPREHEN METABOLIC PANEL: CPT

## 2023-03-16 PROCEDURE — 82306 VITAMIN D 25 HYDROXY: CPT

## 2023-03-16 RX ORDER — AMOXICILLIN 500 MG/1
CAPSULE ORAL
COMMUNITY
Start: 2023-03-10 | End: 2023-03-17

## 2023-03-16 RX ORDER — OXYCODONE AND ACETAMINOPHEN 7.5; 325 MG/1; MG/1
TABLET ORAL
COMMUNITY
Start: 2023-03-04

## 2023-03-16 SDOH — ECONOMIC STABILITY: HOUSING INSECURITY
IN THE LAST 12 MONTHS, WAS THERE A TIME WHEN YOU DID NOT HAVE A STEADY PLACE TO SLEEP OR SLEPT IN A SHELTER (INCLUDING NOW)?: PATIENT REFUSED

## 2023-03-16 SDOH — ECONOMIC STABILITY: FOOD INSECURITY: WITHIN THE PAST 12 MONTHS, YOU WORRIED THAT YOUR FOOD WOULD RUN OUT BEFORE YOU GOT MONEY TO BUY MORE.: PATIENT DECLINED

## 2023-03-16 SDOH — ECONOMIC STABILITY: FOOD INSECURITY: WITHIN THE PAST 12 MONTHS, THE FOOD YOU BOUGHT JUST DIDN'T LAST AND YOU DIDN'T HAVE MONEY TO GET MORE.: PATIENT DECLINED

## 2023-03-16 SDOH — ECONOMIC STABILITY: INCOME INSECURITY: HOW HARD IS IT FOR YOU TO PAY FOR THE VERY BASICS LIKE FOOD, HOUSING, MEDICAL CARE, AND HEATING?: PATIENT DECLINED

## 2023-03-16 ASSESSMENT — ENCOUNTER SYMPTOMS
COUGH: 0
WHEEZING: 0
SHORTNESS OF BREATH: 0
CHEST TIGHTNESS: 0

## 2023-03-16 NOTE — PROGRESS NOTES
ASIYA Rodriguez [facility]  [address] [address]  Dept: [phone]  Dept Fax: [phone]  Loc: [phone]    Freya Taylor is a 80 y.o. female who presents today for her medical conditions/complaints as noted below. Freya Taylor is c/o of   Chief Complaint   Patient presents with    Fatigue     Increased; sleeps alot       HPI:     HPI Here today for fatigue. She has been tired a lot more lately. She had tooth pulled and she has been feeling more cold and clammy and shaky. This was about a week ago and she is still having pain in the area. She is sleeping a lot more than normal and she is cold all the time. Her appetite has been good. She does not feel rested when she wakes up. She is taking multiple naps a day. Her feet have been swelling a little more than normal recently. Her family is worried that her fatigue is caused by hyponatremia. She is not having any issues with chest pain or shortness of breath.        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     L4 & L5    Cancer (HCC)     skin cancers    Depression     History of heart artery stent     Hyperlipidemia     Hypertension     Nerve pain           Social History     Tobacco Use    Smoking status: Never    Smokeless tobacco: Never   Substance Use Topics    Alcohol use: No     Current Outpatient Medications   Medication Sig Dispense Refill    gabapentin (NEURONTIN) 300 MG capsule Take 2 capsules by mouth twice daily 120 capsule 0    lisinopril (PRINIVIL;ZESTRIL) 2.5 MG tablet Take 1 tablet by mouth once daily 90 tablet 1    isosorbide mononitrate (IMDUR) 30 MG extended release tablet Take 1 tablet by mouth once daily 90 tablet 1    busPIRone (BUSPAR) 10 MG tablet Take 1 tablet by mouth 3 times daily as needed (anxiety) 90 tablet 3    hydrALAZINE (APRESOLINE) 25 MG tablet Take 1/2 (one-half) tablet by mouth twice daily 90 tablet 1    carvedilol (COREG) 12.5 MG tablet TAKE 1 TABLET BY MOUTH TWICE DAILY WITH MEALS 180 tablet 1    amLODIPine (NORVASC) 5 MG tablet Take 1 tablet by mouth twice daily 180 tablet 1    Ferrous Sulfate (IRON) 325 (65 Fe) MG TABS TAKE 1 TABLET BY MOUTH THREE TIMES DAILY 270 tablet 1    fluticasone (FLONASE) 50 MCG/ACT nasal spray 1 spray by Each Nostril route daily 16 g 0    rOPINIRole (REQUIP) 0.25 MG tablet Take 1 tablet by mouth nightly 90 tablet 1    senna (SENOKOT) 8.6 MG tablet Take 1 tablet by mouth 2 times daily as needed for Constipation 60 tablet 0    TOBRADEX ST 0.3-0.05 % SUSP INSTILL 1 DROP INTO RIGHT EYE THREE TIMES DAILY      glucose monitoring (FREESTYLE FREEDOM) kit 1 kit by Does not apply route daily 1 kit 0    blood glucose monitor strips Test ONCE times day & as needed for symptoms of irregular blood glucose. Dispense sufficient amount for indicated testing frequency plus additional to accommodate PRN testing needs. 100 strip 0    Lancets MISC 1 each by Does not apply route daily 100 each 1    Handicap Placard MISC by Does not apply route Exp 3/1/2026 1 each 0    Glycerin, Laxative, (GLYCERIN ADULT) 2.1 g suppository Place 1 suppository rectally as needed (constipation) 5 suppository 0    polyethylene glycol (GLYCOLAX) 17 g packet Take 17 g by mouth daily      amoxicillin (AMOXIL) 500 MG capsule take 1 capsule by mouth three times a day for 7 days      oxyCODONE-acetaminophen (PERCOCET) 7.5-325 MG per tablet TAKE 1 TABLET BY MOUTH EVERY 6 HOURS AS NEEDED FOR PAIN FOR UP TO 30 DAYS MAX DAILY AMOUNT 4 TABLETS DO NOT 03/03/2023       No current facility-administered medications for this visit. Allergies   Allergen Reactions    Atorvastatin      Other reaction(s): Muscle Pain    Codeine     Diltiazem Hcl Hives    Levofloxacin      Other reaction(s):  Intolerance-unknown    Pregabalin      lyrica    Simvastatin      Other reaction(s): Muscle Pain Subjective:     Review of Systems   Constitutional:  Positive for fatigue. Negative for activity change, appetite change, chills and fever. Eyes:  Negative for visual disturbance. Respiratory:  Negative for cough, chest tightness, shortness of breath and wheezing. Cardiovascular:  Negative for chest pain, palpitations and leg swelling. Genitourinary:  Negative for difficulty urinating. Neurological:  Negative for dizziness, syncope, weakness and light-headedness. Psychiatric/Behavioral:  Negative for decreased concentration, dysphoric mood and sleep disturbance. The patient is not nervous/anxious. Objective:      Physical Exam  Vitals and nursing note reviewed. Constitutional:       General: She is not in acute distress. Appearance: She is well-developed. Eyes:      Conjunctiva/sclera: Conjunctivae normal.   Neck:      Thyroid: No thyromegaly. Cardiovascular:      Rate and Rhythm: Normal rate and regular rhythm. Heart sounds: Normal heart sounds. No murmur heard. Pulmonary:      Effort: Pulmonary effort is normal. No respiratory distress. Breath sounds: Normal breath sounds. No wheezing. Musculoskeletal:      Cervical back: Normal range of motion and neck supple. Lymphadenopathy:      Cervical: No cervical adenopathy. Skin:     General: Skin is warm and dry. Findings: No erythema or rash. Neurological:      Mental Status: She is alert and oriented to person, place, and time. Psychiatric:         Mood and Affect: Mood normal.         Behavior: Behavior normal.         Thought Content: Thought content normal.         Judgment: Judgment normal.     /70   Pulse 81   Temp 98.4 °F (36.9 °C)   Ht 5' 1\" (1.549 m)   Wt 92 lb 11.2 oz (42 kg)   SpO2 94%   BMI 17.52 kg/m²     Assessment:       Diagnosis Orders   1. Essential hypertension  Comprehensive Metabolic Panel      2.  Chronic fatigue  Comprehensive Metabolic Panel    TSH With Reflex Ft4    CBC with Auto Differential    Vitamin D 25 Hydroxy      3. Vitamin D deficiency  Vitamin D 25 Hydroxy      4. Mixed simple and mucopurulent chronic bronchitis ([facility] [address]        5. New onset of congestive heart failure (HCC)        6. Opioid use, unspecified with unspecified opioid-induced disorder ([facility] [address]                  Plan:       Fatigue: new; likely due to age and time change, but I will check for electrolyte imbalance, anemia, thyroid dysfunction and liver or kidneys issues. I will treat based on results. HTN: stable; her blood pressure is well controled     COPD: stable; she has not had any issues with chest pain or shortness of breath. NO cough. CHF: stable; her ankles have been slightly swollen but are not right now so I will just continue to monitor    Opioid use; stable; her pain is well controlled on her percocet. Return if symptoms worsen or fail to improve. Orders Placed This Encounter   Procedures    Comprehensive Metabolic Panel     Standing Status:   Future     Number of Occurrences:   1     Standing Expiration Date:   3/16/2024    TSH With Reflex Ft4     Standing Status:   Future     Number of Occurrences:   1     Standing Expiration Date:   3/16/2024    CBC with Auto Differential     Standing Status:   Future     Number of Occurrences:   1     Standing Expiration Date:   3/15/2024    Vitamin D 25 Hydroxy     Standing Status:   Future     Number of Occurrences:   1     Standing Expiration Date:   3/15/2024           Patientgiven educational materials - see patient instructions. Discussed use, benefit,and side effects of prescribed medications. All patient questions answered. Ptvoiced understanding. Reviewed health maintenance. Instructed to continue currentmedications, diet and exercise. Patient agreed with treatment plan. Follow up asdirected.      Electronically signed by Kaden Snell MD on 3/16/2023 at 3:08 PM

## 2023-04-04 DIAGNOSIS — M54.50 CHRONIC BILATERAL LOW BACK PAIN WITHOUT SCIATICA: Primary | ICD-10-CM

## 2023-04-04 DIAGNOSIS — G89.29 CHRONIC BILATERAL LOW BACK PAIN WITHOUT SCIATICA: Primary | ICD-10-CM

## 2023-04-04 RX ORDER — OXYCODONE AND ACETAMINOPHEN 7.5; 325 MG/1; MG/1
1 TABLET ORAL EVERY 6 HOURS PRN
Qty: 120 TABLET | Refills: 0 | Status: SHIPPED | OUTPATIENT
Start: 2023-04-04 | End: 2023-05-02 | Stop reason: SDUPTHER

## 2023-05-02 DIAGNOSIS — M54.50 CHRONIC BILATERAL LOW BACK PAIN WITHOUT SCIATICA: ICD-10-CM

## 2023-05-02 DIAGNOSIS — G89.29 CHRONIC BILATERAL LOW BACK PAIN WITHOUT SCIATICA: ICD-10-CM

## 2023-05-02 RX ORDER — ROPINIROLE 0.25 MG/1
TABLET, FILM COATED ORAL
Qty: 90 TABLET | Refills: 1 | Status: SHIPPED | OUTPATIENT
Start: 2023-05-02

## 2023-05-02 RX ORDER — OXYCODONE AND ACETAMINOPHEN 7.5; 325 MG/1; MG/1
1 TABLET ORAL EVERY 6 HOURS PRN
Qty: 120 TABLET | Refills: 0 | Status: SHIPPED | OUTPATIENT
Start: 2023-05-04 | End: 2023-05-09 | Stop reason: SDUPTHER

## 2023-05-04 DIAGNOSIS — M54.50 CHRONIC BILATERAL LOW BACK PAIN WITHOUT SCIATICA: ICD-10-CM

## 2023-05-04 DIAGNOSIS — G89.29 CHRONIC BILATERAL LOW BACK PAIN WITHOUT SCIATICA: ICD-10-CM

## 2023-05-04 RX ORDER — GABAPENTIN 300 MG/1
CAPSULE ORAL
Qty: 120 CAPSULE | Refills: 0 | Status: SHIPPED | OUTPATIENT
Start: 2023-05-04 | End: 2023-06-30

## 2023-05-09 ENCOUNTER — OFFICE VISIT (OUTPATIENT)
Dept: FAMILY MEDICINE CLINIC | Age: 88
End: 2023-05-09
Payer: MEDICARE

## 2023-05-09 VITALS
BODY MASS INDEX: 17.67 KG/M2 | OXYGEN SATURATION: 92 % | WEIGHT: 93.6 LBS | SYSTOLIC BLOOD PRESSURE: 160 MMHG | HEART RATE: 64 BPM | DIASTOLIC BLOOD PRESSURE: 62 MMHG | HEIGHT: 61 IN

## 2023-05-09 DIAGNOSIS — G89.29 CHRONIC BILATERAL LOW BACK PAIN WITHOUT SCIATICA: Primary | ICD-10-CM

## 2023-05-09 DIAGNOSIS — F41.9 ANXIETY: ICD-10-CM

## 2023-05-09 DIAGNOSIS — H61.23 HEARING LOSS OF BOTH EARS DUE TO CERUMEN IMPACTION: ICD-10-CM

## 2023-05-09 DIAGNOSIS — B35.1 NAIL FUNGUS: ICD-10-CM

## 2023-05-09 DIAGNOSIS — M54.50 CHRONIC BILATERAL LOW BACK PAIN WITHOUT SCIATICA: Primary | ICD-10-CM

## 2023-05-09 PROCEDURE — 69210 REMOVE IMPACTED EAR WAX UNI: CPT | Performed by: FAMILY MEDICINE

## 2023-05-09 PROCEDURE — 99213 OFFICE O/P EST LOW 20 MIN: CPT

## 2023-05-09 RX ORDER — OXYCODONE AND ACETAMINOPHEN 7.5; 325 MG/1; MG/1
1 TABLET ORAL EVERY 6 HOURS PRN
Qty: 120 TABLET | Refills: 0 | Status: SHIPPED | OUTPATIENT
Start: 2023-05-09 | End: 2023-05-09 | Stop reason: SDUPTHER

## 2023-05-09 RX ORDER — OXYCODONE AND ACETAMINOPHEN 7.5; 325 MG/1; MG/1
1 TABLET ORAL EVERY 6 HOURS PRN
Qty: 120 TABLET | Refills: 0 | Status: SHIPPED | OUTPATIENT
Start: 2023-05-09 | End: 2023-06-08

## 2023-05-09 ASSESSMENT — ENCOUNTER SYMPTOMS
COUGH: 0
SHORTNESS OF BREATH: 0
BACK PAIN: 1
CHEST TIGHTNESS: 0
WHEEZING: 0

## 2023-05-09 ASSESSMENT — PATIENT HEALTH QUESTIONNAIRE - PHQ9
SUM OF ALL RESPONSES TO PHQ QUESTIONS 1-9: 0
SUM OF ALL RESPONSES TO PHQ QUESTIONS 1-9: 0
1. LITTLE INTEREST OR PLEASURE IN DOING THINGS: 0
2. FEELING DOWN, DEPRESSED OR HOPELESS: 0
SUM OF ALL RESPONSES TO PHQ9 QUESTIONS 1 & 2: 0
SUM OF ALL RESPONSES TO PHQ QUESTIONS 1-9: 0
SUM OF ALL RESPONSES TO PHQ QUESTIONS 1-9: 0

## 2023-05-09 NOTE — PROGRESS NOTES
ASIYA Rodriguez [address]  [address] [address]  Dept: [phone]  Dept Fax: [phone]  Loc: [phone]    Servando Ball is a 80 y.o. female who presents today for her medical conditions/complaints as notedbelow. Servando Ball is c/o of   Chief Complaint   Patient presents with    Back Pain     3 month    Nail Problem     Right hand, thumb-poss infection         HPI:     HPI Here today for a follow up of her back pain. Back pain: stable; she has been doing well on her percocet. She is using miralax and it is working well. She is sleeping very well. She sleeps a lot during the day as well. They are walking a little now that it has gotten warmer. Her hearing has been worse even with the hearing aides. They are brand new hearing aides. Anxiety: She is taking her buspar in the evening and she is taking them during the day. She is doing okay though. She has been eating very well. No issues with chest pain or shortness of breath. She was going to get her nails done and she has a \"mushy\" spot on her right thumb nail. She has been treating it with vicks with mild improvement. It is not sore. There is a white spot on top of the nail as well. 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  Max Daily Amount: 4 tablets 120 tablet 0    gabapentin (NEURONTIN) 300 MG capsule Take

## 2023-06-30 DIAGNOSIS — G89.29 CHRONIC BILATERAL LOW BACK PAIN WITHOUT SCIATICA: ICD-10-CM

## 2023-06-30 DIAGNOSIS — M54.50 CHRONIC BILATERAL LOW BACK PAIN WITHOUT SCIATICA: ICD-10-CM

## 2023-06-30 RX ORDER — GABAPENTIN 300 MG/1
CAPSULE ORAL
Qty: 120 CAPSULE | Refills: 0 | Status: SHIPPED | OUTPATIENT
Start: 2023-06-30 | End: 2023-08-03

## 2023-06-30 NOTE — TELEPHONE ENCOUNTER
Wilton Vargas called requesting a refill of the below medication which has been pended for you:     Requested Prescriptions     Pending Prescriptions Disp Refills    gabapentin (NEURONTIN) 300 MG capsule [Pharmacy Med Name: Gabapentin 300 MG Oral Capsule] 120 capsule 0     Sig: Take 2 capsules by mouth twice daily       Last Appointment Date: 5/9/2023  Next Appointment Date: 8/10/2023    Allergies   Allergen Reactions    Atorvastatin      Other reaction(s): Muscle Pain    Codeine     Diltiazem Hcl Hives    Levofloxacin      Other reaction(s):  Intolerance-unknown    Pregabalin      lyrica    Simvastatin      Other reaction(s): Muscle Pain Qbrexza Counseling:  I discussed with the patient the risks of Qbrexza including but not limited to headache, mydriasis, blurred vision, dry eyes, nasal dryness, dry mouth, dry throat, dry skin, urinary hesitation, and constipation.  Local skin reactions including erythema, burning, stinging, and itching can also occur.

## 2023-07-07 RX ORDER — CARVEDILOL 12.5 MG/1
TABLET ORAL
Qty: 180 TABLET | Refills: 1 | Status: SHIPPED | OUTPATIENT
Start: 2023-07-07

## 2023-07-07 NOTE — TELEPHONE ENCOUNTER
Lev Aragon called requesting a refill of the below medication which has been pended for you:     Requested Prescriptions     Pending Prescriptions Disp Refills    carvedilol (COREG) 12.5 MG tablet [Pharmacy Med Name: Carvedilol 12.5 MG Oral Tablet] 180 tablet 1     Sig: TAKE 1 TABLET BY MOUTH TWICE DAILY WITH MEALS       Last Appointment Date: 5/9/2023  Next Appointment Date: 8/10/2023    Allergies   Allergen Reactions    Atorvastatin      Other reaction(s): Muscle Pain    Codeine     Diltiazem Hcl Hives    Levofloxacin      Other reaction(s):  Intolerance-unknown    Pregabalin      lyrica    Simvastatin      Other reaction(s): Muscle Pain

## 2023-08-03 DIAGNOSIS — G89.29 CHRONIC BILATERAL LOW BACK PAIN WITHOUT SCIATICA: ICD-10-CM

## 2023-08-03 DIAGNOSIS — D64.9 ANEMIA, UNSPECIFIED TYPE: ICD-10-CM

## 2023-08-03 DIAGNOSIS — I10 ESSENTIAL HYPERTENSION: ICD-10-CM

## 2023-08-03 DIAGNOSIS — M54.50 CHRONIC BILATERAL LOW BACK PAIN WITHOUT SCIATICA: ICD-10-CM

## 2023-08-03 RX ORDER — AMLODIPINE BESYLATE 5 MG/1
TABLET ORAL
Qty: 180 TABLET | Refills: 1 | Status: SHIPPED | OUTPATIENT
Start: 2023-08-03

## 2023-08-03 RX ORDER — HYDRALAZINE HYDROCHLORIDE 25 MG/1
TABLET, FILM COATED ORAL
Qty: 90 TABLET | Refills: 1 | Status: SHIPPED | OUTPATIENT
Start: 2023-08-03

## 2023-08-03 RX ORDER — ISOSORBIDE MONONITRATE 30 MG/1
TABLET, EXTENDED RELEASE ORAL
Qty: 90 TABLET | Refills: 1 | Status: SHIPPED | OUTPATIENT
Start: 2023-08-03

## 2023-08-03 RX ORDER — GABAPENTIN 300 MG/1
CAPSULE ORAL
Qty: 120 CAPSULE | Refills: 2 | Status: SHIPPED | OUTPATIENT
Start: 2023-08-03 | End: 2023-11-01

## 2023-08-03 RX ORDER — FERROUS SULFATE 325(65) MG
TABLET ORAL
Qty: 270 TABLET | Refills: 1 | Status: SHIPPED | OUTPATIENT
Start: 2023-08-03

## 2023-08-03 RX ORDER — LISINOPRIL 2.5 MG/1
TABLET ORAL
Qty: 90 TABLET | Refills: 1 | Status: SHIPPED | OUTPATIENT
Start: 2023-08-03

## 2023-08-03 NOTE — TELEPHONE ENCOUNTER
Per [facility], last filled 6/30/2023, #120/30 days      Ishan Rodríguez called requesting a refill of the below medication which has been pended for you:     Requested Prescriptions     Pending Prescriptions Disp Refills    gabapentin (NEURONTIN) 300 MG capsule [Pharmacy Med Name: Gabapentin 300 MG Oral Capsule] 120 capsule 0     Sig: Take 2 capsules by mouth twice daily    lisinopril (PRINIVIL;ZESTRIL) 2.5 MG tablet [Pharmacy Med Name: Lisinopril 2.5 MG Oral Tablet] 90 tablet 1     Sig: Take 1 tablet by mouth once daily    isosorbide mononitrate (IMDUR) 30 MG extended release tablet [Pharmacy Med Name: Isosorbide Mononitrate ER 30 MG Oral Tablet Extended Release 24 Hour] 90 tablet 1     Sig: Take 1 tablet by mouth once daily    ferrous sulfate (SV IRON) 325 (65 Fe) MG tablet [Pharmacy Med Name: SV Iron 325 MG Oral Tablet] 270 tablet 1     Sig: TAKE 1 TABLET BY MOUTH THREE TIMES DAILY    amLODIPine (NORVASC) 5 MG tablet [Pharmacy Med Name: amLODIPine Besylate 5 MG Oral Tablet] 180 tablet 1     Sig: Take 1 tablet by mouth twice daily    hydrALAZINE (APRESOLINE) 25 MG tablet [Pharmacy Med Name: hydrALAZINE HCl 25 MG Oral Tablet] 90 tablet 1     Sig: Take 1/2 (one-half) tablet by mouth twice daily       Last Appointment Date: 5/9/2023  Next Appointment Date: 8/10/2023    Allergies   Allergen Reactions    Atorvastatin      Other reaction(s): Muscle Pain    Codeine     Diltiazem Hcl Hives    Levofloxacin      Other reaction(s):  Intolerance-unknown    Pregabalin      lyrica    Simvastatin      Other reaction(s): Muscle Pain

## 2023-08-10 ENCOUNTER — OFFICE VISIT (OUTPATIENT)
Dept: FAMILY MEDICINE CLINIC | Age: 88
End: 2023-08-10
Payer: MEDICARE

## 2023-08-10 VITALS
DIASTOLIC BLOOD PRESSURE: 82 MMHG | WEIGHT: 93 LBS | SYSTOLIC BLOOD PRESSURE: 142 MMHG | HEART RATE: 64 BPM | BODY MASS INDEX: 17.56 KG/M2 | HEIGHT: 61 IN | OXYGEN SATURATION: 95 % | TEMPERATURE: 97.7 F

## 2023-08-10 DIAGNOSIS — J41.8 MIXED SIMPLE AND MUCOPURULENT CHRONIC BRONCHITIS (HCC): Primary | ICD-10-CM

## 2023-08-10 DIAGNOSIS — G89.29 CHRONIC BILATERAL LOW BACK PAIN WITHOUT SCIATICA: ICD-10-CM

## 2023-08-10 DIAGNOSIS — M54.50 CHRONIC BILATERAL LOW BACK PAIN WITHOUT SCIATICA: ICD-10-CM

## 2023-08-10 DIAGNOSIS — I10 ESSENTIAL HYPERTENSION: ICD-10-CM

## 2023-08-10 PROCEDURE — G8427 DOCREV CUR MEDS BY ELIG CLIN: HCPCS | Performed by: FAMILY MEDICINE

## 2023-08-10 PROCEDURE — G8419 CALC BMI OUT NRM PARAM NOF/U: HCPCS | Performed by: FAMILY MEDICINE

## 2023-08-10 PROCEDURE — 3023F SPIROM DOC REV: CPT | Performed by: FAMILY MEDICINE

## 2023-08-10 PROCEDURE — 1123F ACP DISCUSS/DSCN MKR DOCD: CPT | Performed by: FAMILY MEDICINE

## 2023-08-10 PROCEDURE — 99212 OFFICE O/P EST SF 10 MIN: CPT | Performed by: FAMILY MEDICINE

## 2023-08-10 PROCEDURE — 1036F TOBACCO NON-USER: CPT | Performed by: FAMILY MEDICINE

## 2023-08-10 PROCEDURE — 1090F PRES/ABSN URINE INCON ASSESS: CPT | Performed by: FAMILY MEDICINE

## 2023-08-10 PROCEDURE — 99214 OFFICE O/P EST MOD 30 MIN: CPT | Performed by: FAMILY MEDICINE

## 2023-08-10 RX ORDER — BUSPIRONE HYDROCHLORIDE 10 MG/1
10 TABLET ORAL 3 TIMES DAILY PRN
Qty: 90 TABLET | Refills: 5 | Status: SHIPPED | OUTPATIENT
Start: 2023-08-10

## 2023-08-10 RX ORDER — OXYCODONE AND ACETAMINOPHEN 7.5; 325 MG/1; MG/1
1 TABLET ORAL EVERY 6 HOURS PRN
Qty: 120 TABLET | Refills: 0 | Status: SHIPPED | OUTPATIENT
Start: 2023-08-10 | End: 2023-08-10 | Stop reason: SDUPTHER

## 2023-08-10 RX ORDER — OXYCODONE AND ACETAMINOPHEN 7.5; 325 MG/1; MG/1
1 TABLET ORAL EVERY 6 HOURS PRN
Qty: 120 TABLET | Refills: 0 | Status: SHIPPED | OUTPATIENT
Start: 2023-08-10 | End: 2023-09-09

## 2023-08-10 ASSESSMENT — ENCOUNTER SYMPTOMS
SHORTNESS OF BREATH: 0
CHEST TIGHTNESS: 0
WHEEZING: 0
CONSTIPATION: 0
COUGH: 0
ABDOMINAL PAIN: 0
DIARRHEA: 0

## 2023-08-10 NOTE — PROGRESS NOTES
Sig: Take 1 tablet by mouth every 6 hours as needed for Pain for up to 30 days. Max Daily Amount: 4 tablets     Dispense:  120 tablet     Refill:  0     Do not fill until 10/31/23       Patientgiven educational materials - see patient instructions. Discussed use, benefit,and side effects of prescribed medications. All patient questions answered. Ptvoiced understanding. Reviewed health maintenance. Instructed to continue currentmedications, diet and exercise. Patient agreed with treatment plan. Follow up asdirected.      Electronically signed by Edwin Burgess MD on 8/10/2023 at 9:28 AM

## 2023-08-22 NOTE — TELEPHONE ENCOUNTER
[facility] Medicine Progress Note    SUBJECTIVE:    Patient seen postoperatively in the icu.  He is intubated and sedated but follows commands. D/w nurse, critical care following.     OBJECTIVE:  I/O's    Intake/Output Summary (Last 24 hours) at 8/22/2023 1548  Last data filed at 8/22/2023 1353  Gross per 24 hour   Intake 2650 ml   Output 5650 ml   Net -3000 ml         Last Recorded Vitals  Blood pressure 103/63, pulse 81, temperature 97.4 °F (36.3 °C), temperature source Axillary, resp. rate 20, height 6' 1\" (1.854 m), weight 113.2 kg (249 lb 9 oz), SpO2 100 %.  Body mass index is 32.93 kg/m².      Physical Exam  Vitals and nursing note reviewed.   Constitutional:       General: He is not in acute distress.     Appearance: He is obese. He is ill-appearing. He is not toxic-appearing.   HENT:      Head: Normocephalic and atraumatic.      Nose: Nose normal.      Mouth/Throat:      Comments: Et tube     Neck: Neck supple. No rigidity.   Eyes:      General: Lids are normal.      Conjunctiva/sclera: Conjunctivae normal.      Pupils: Pupils are equal, round, and reactive to light.   Cardiovascular:      Rate and Rhythm: Normal rate and regular rhythm.      Heart sounds: Heart sounds are distant.      Comments: Bilateral UE'sedema   Pulmonary:      Effort: Pulmonary effort is normal. No respiratory distress.      Breath sounds: Rhonchi present.      Comments: Coarse breath sounds  Abdominal:      General: Bowel sounds are normal.      Palpations: Abdomen is soft.      Tenderness: There is no abdominal tenderness. There is no guarding.   Musculoskeletal:      Right lower leg: Edema present.      Left lower leg: Edema present.   Skin:     General: Skin is warm and dry.      Coloration: Skin is not jaundiced.   Neurological:      Mental Status: He is alert.      Comments: Nonverbal, moves with purpose, follows commands   Psychiatric:         Mood and Affect: Affect is blunt.         Behavior: Behavior is agitated. Behavior is  Per [facility], last filled 1/8/2021, #120/30 days      Melvin Hayes called requesting a refill of the below medication which has been pended for you:     Requested Prescriptions     Pending Prescriptions Disp Refills    amLODIPine (NORVASC) 5 MG tablet 60 tablet 3    oxyCODONE-acetaminophen (PERCOCET) 7.5-325 MG per tablet 120 tablet 0     Sig: Take 1 tablet by mouth every 6 hours as needed for Pain for up to 30 days. Intended supply: 30 days       Last Appointment Date: 2/25/2021  Next Appointment Date: 5/27/2021    Allergies   Allergen Reactions    Atorvastatin      Other reaction(s): Muscle Pain    Codeine     Diltiazem Hcl Hives    Levofloxacin      Other reaction(s):  Intolerance-unknown    Pregabalin      lyrica    Simvastatin      Other reaction(s): Muscle Pain cooperative.      Comments: Unable to assess         Labs     Recent Results (from the past 24 hour(s))   TYPE/SCREEN    Collection Time: 08/21/23  4:39 PM   Result Value Ref Range    ABO/RH(D) A Rh Positive     ANTIBODY SCREEN Negative     TYPE AND SCREEN EXPIRATION DATE 08/24/2023 23:59    GLUCOSE, BEDSIDE - POINT OF CARE    Collection Time: 08/21/23  5:30 PM   Result Value Ref Range    GLUCOSE, BEDSIDE - POINT OF CARE 193 (H) 70 - 99 mg/dL   GLUCOSE, BEDSIDE - POINT OF CARE    Collection Time: 08/21/23  8:13 PM   Result Value Ref Range    GLUCOSE, BEDSIDE - POINT OF CARE 220 (H) 70 - 99 mg/dL   GLUCOSE, BEDSIDE - POINT OF CARE    Collection Time: 08/22/23  5:02 AM   Result Value Ref Range    GLUCOSE, BEDSIDE - POINT OF CARE 142 (H) 70 - 99 mg/dL   Comprehensive Metabolic Panel    Collection Time: 08/22/23  8:58 AM   Result Value Ref Range    Fasting Status      Sodium 129 (L) 135 - 145 mmol/L    Potassium 3.8 3.4 - 5.1 mmol/L    Chloride 94 (L) 97 - 110 mmol/L    Carbon Dioxide 35 (H) 21 - 32 mmol/L    Anion Gap 4 (L) 7 - 19 mmol/L    Glucose 119 (H) 70 - 99 mg/dL    BUN 14 6 - 20 mg/dL    Creatinine 0.34 (L) 0.67 - 1.17 mg/dL    Glomerular Filtration Rate >90 >=60    BUN/Cr 41 (H) 7 - 25    Calcium 7.9 (L) 8.4 - 10.2 mg/dL    Bilirubin, Total 0.3 0.2 - 1.0 mg/dL    GOT/AST 25 <=37 Units/L    GPT/ALT 20 <64 Units/L    Alkaline Phosphatase 656 (H) 45 - 117 Units/L    Albumin 1.3 (L) 3.6 - 5.1 g/dL    Protein, Total 8.6 (H) 6.4 - 8.2 g/dL    Globulin 7.3 (H) 2.0 - 4.0 g/dL    A/G Ratio 0.2 (L) 1.0 - 2.4   Magnesium    Collection Time: 08/22/23  8:58 AM   Result Value Ref Range    Magnesium 1.8 1.7 - 2.4 mg/dL   CBC No Differential    Collection Time: 08/22/23  8:58 AM   Result Value Ref Range    WBC 9.8 4.2 - 11.0 K/mcL    RBC 2.83 (L) 4.50 - 5.90 mil/mcL    HGB 9.5 (L) 13.0 - 17.0 g/dL    HCT 29.3 (L) 39.0 - 51.0 %    .5 (H) 78.0 - 100.0 fl    MCH 33.6 26.0 - 34.0 pg    MCHC 32.4 32.0 - 36.5 g/dL      140 - 450 K/mcL    RDW-CV 19.1 (H) 11.0 - 15.0 %    RDW-SD 72.8 (H) 39.0 - 50.0 fL    NRBC 0 <=0 /100 WBC   Vancomycin, Trough    Collection Time: 08/22/23  8:58 AM   Result Value Ref Range    Vancomycin, Trough 15.2 10.0 - 20.0 mcg/mL   GLUCOSE, BEDSIDE - POINT OF CARE    Collection Time: 08/22/23  9:58 AM   Result Value Ref Range    GLUCOSE, BEDSIDE - POINT OF CARE 77 70 - 99 mg/dL   GLUCOSE, BEDSIDE - POINT OF CARE    Collection Time: 08/22/23 11:08 AM   Result Value Ref Range    GLUCOSE, BEDSIDE - POINT OF CARE 78 70 - 99 mg/dL   Prepare Red Blood Cells: 2 Units    Collection Time: 08/22/23 11:12 AM   Result Value Ref Range    UNIT BLOOD TYPE A Pos     ISBT BLOOD TYPE 6200     BLOOD EXPIRATION DATE 75419946867014     UNIT NUMBER [id number]     DISPENSE STATUS Issued     PRODUCT ID Red Blood Cells     PRODUCT CODE [id number]     PRODUCT DESCRIPTION RBC AS-1 LR     UNIT BLOOD TYPE A Pos     ISBT BLOOD TYPE 6200     BLOOD EXPIRATION DATE 03638204921908     UNIT NUMBER [id number]     DISPENSE STATUS Issued     PRODUCT ID Red Blood Cells     PRODUCT CODE [id number]     PRODUCT DESCRIPTION RBC AS-1 LR     CROSSMATCH RESULT Compatible     CROSSMATCH RESULT Compatible     ISSUE DATE/TIME [id number]     ISSUE DATE/TIME [id number]    Anaerobe/Aerobe, Bacterial Culture With Gram Stain    Collection Time: 08/22/23 11:23 AM    Specimen: Lung, Right; Tissue   Result Value Ref Range    CULTURE WITH GRAM STAIN, ANAEROBE/AEROBE Culture in progress.     Gram Stain No epithelial cells seen.     Gram Stain Few Polymorphonuclear cells.     Gram Stain No organisms seen.    BLOOD GAS, ARTERIAL WITH COOXIMETRY - RESPIRATORY    Collection Time: 08/22/23 11:58 AM   Result Value Ref Range    BASE EXCESS / DEFICIT, ARTERIAL - RESPIRATORY 4 (H) -2 - 3 mmol/L    HCO3, ARTERIAL - RESPIRATORY 32 (H) 22 - 28 mmol/L    O2 CONTENT, ARTERIAL - RESPIRATORY 11 (L) 15 - 23 %    PCO2, ARTERIAL - RESPIRATORY 64 (HH) 35 - 48 mm Hg    PH,  ARTERIAL - RESPIRATORY 7.30 (L) 7.35 - 7.45 Units    PO2, ARTERIAL - RESPIRATORY 76 (L) 83 - 108 mm Hg    O2 SATURATION, ARTERIAL - RESPIRATORY 96 95 - 99 %    CONDITION - RESPIRATORY ;ALLENS TEST N/A     CARBOXYHEMOGLOBIN - RESPIRATORY 3.0 (H) <1.5 %    HEMOGLOBIN - RESPIRATORY 8.2 (L) 13.0 - 17.0 g/dL    METHEMOGLOBIN - RESPIRATORY 1.3 <=1.6 %    OXYHEMOGLOBIN, ARTERIAL - RESPIRATORY 92.2 (L) 94.0 - 98.0 %    SITE - RESPIRATORY Arterial Line    LACTIC ACID, ARTERIAL - RESPIRATORY    Collection Time: 08/22/23 11:58 AM   Result Value Ref Range    LACTIC ACID, ARTERIAL - RESPIRATORY 1.1 <1.6 mmol/L   POTASSIUM - RESPIRATORY    Collection Time: 08/22/23 11:58 AM   Result Value Ref Range    POTASSIUM - RESPIRATORY 4.3 3.4 - 5.1 mmol/L   CALCIUM, IONIZED - RESPIRATORY    Collection Time: 08/22/23 11:58 AM   Result Value Ref Range    CALCIUM, IONIZED - RESPIRATORY 1.18 1.15 - 1.29 mmol/L   SODIUM - RESPIRATORY    Collection Time: 08/22/23 11:58 AM   Result Value Ref Range    SODIUM - RESPIRATORY 128 (L) 135 - 145 mmol/L   CHLORIDE - RESPIRATORY    Collection Time: 08/22/23 11:58 AM   Result Value Ref Range    CHLORIDE - RESPIRATORY 97 97 - 110 mmol/L   GLUCOSE - RESPIRATORY    Collection Time: 08/22/23 11:58 AM   Result Value Ref Range    GLUCOSE - RESPIRATORY 98 65 - 99 mg/dL   HEMATOCRIT - RESPIRATORY    Collection Time: 08/22/23 11:58 AM   Result Value Ref Range    HEMATOCRIT - RESPIRATORY 25.0 (L) 39.0 - 51.0 %   Prepare Red Blood Cells: 2 Units    Collection Time: 08/22/23 12:20 PM   Result Value Ref Range    UNIT BLOOD TYPE A Pos     ISBT BLOOD TYPE 6200     BLOOD EXPIRATION DATE 20230908235900     UNIT NUMBER [id number]     DISPENSE STATUS Issued     PRODUCT ID Red Blood Cells     PRODUCT CODE [id number]     PRODUCT DESCRIPTION RBC AS-1 LR     UNIT BLOOD TYPE A Pos     ISBT BLOOD TYPE 6200     BLOOD EXPIRATION DATE 20230908235900     UNIT NUMBER [id number]     DISPENSE STATUS Issued     PRODUCT ID Red Blood  Cells     PRODUCT CODE [id number]     PRODUCT DESCRIPTION RBC AS-1 LR     CROSSMATCH RESULT Compatible     CROSSMATCH RESULT Compatible     ISSUE DATE/TIME [id number]     ISSUE DATE/TIME [id number]    GLUCOSE, BEDSIDE - POINT OF CARE    Collection Time: 08/22/23 12:26 PM   Result Value Ref Range    GLUCOSE, BEDSIDE - POINT OF CARE 150 (H) 70 - 99 mg/dL   GLUCOSE, BEDSIDE - POINT OF CARE    Collection Time: 08/22/23  1:45 PM   Result Value Ref Range    GLUCOSE, BEDSIDE - POINT OF CARE 126 (H) 70 - 99 mg/dL   BLOOD GAS, ARTERIAL WITH COOXIMETRY - RESPIRATORY    Collection Time: 08/22/23  3:26 PM   Result Value Ref Range    BASE EXCESS / DEFICIT, ARTERIAL - RESPIRATORY 2 -2 - 3 mmol/L    HCO3, ARTERIAL - RESPIRATORY 28 22 - 28 mmol/L    O2 CONTENT, ARTERIAL - RESPIRATORY 14 (L) 15 - 23 %    PCO2, ARTERIAL - RESPIRATORY 48 35 - 48 mm Hg    PH, ARTERIAL - RESPIRATORY 7.37 7.35 - 7.45 Units    PO2, ARTERIAL - RESPIRATORY 313 (H) 83 - 108 mm Hg    O2 SATURATION, ARTERIAL - RESPIRATORY 100 (H) 95 - 99 %    CONDITION - RESPIRATORY ;ALLENS TEST N/A     CARBOXYHEMOGLOBIN - RESPIRATORY 1.9 (H) <1.5 %    FIO2 - RESPIRATORY 100 %    HEMOGLOBIN - RESPIRATORY 9.5 (L) 13.0 - 17.0 g/dL    METHEMOGLOBIN - RESPIRATORY 0.7 <=1.6 %    OXYHEMOGLOBIN, ARTERIAL - RESPIRATORY 97.1 94.0 - 98.0 %    SITE - RESPIRATORY Arterial Line    LACTIC ACID, ARTERIAL - RESPIRATORY    Collection Time: 08/22/23  3:26 PM   Result Value Ref Range    LACTIC ACID, ARTERIAL - RESPIRATORY 1.7 (H) <1.6 mmol/L   POTASSIUM - RESPIRATORY    Collection Time: 08/22/23  3:26 PM   Result Value Ref Range    POTASSIUM - RESPIRATORY 4.0 3.4 - 5.1 mmol/L   CALCIUM, IONIZED - RESPIRATORY    Collection Time: 08/22/23  3:26 PM   Result Value Ref Range    CALCIUM, IONIZED - RESPIRATORY 1.16 1.15 - 1.29 mmol/L   SODIUM - RESPIRATORY    Collection Time: 08/22/23  3:26 PM   Result Value Ref Range    SODIUM - RESPIRATORY 128 (L) 135 - 145 mmol/L   CHLORIDE - RESPIRATORY     Collection Time: 08/22/23  3:26 PM   Result Value Ref Range    CHLORIDE - RESPIRATORY 98 97 - 110 mmol/L   GLUCOSE - RESPIRATORY    Collection Time: 08/22/23  3:26 PM   Result Value Ref Range    GLUCOSE - RESPIRATORY 123 (H) 65 - 99 mg/dL   HEMATOCRIT - RESPIRATORY    Collection Time: 08/22/23  3:26 PM   Result Value Ref Range    HEMATOCRIT - RESPIRATORY 29.0 (L) 39.0 - 51.0 %        Imaging  No images are attached to the encounter.     ASSESSMENT/PLAN:     Anasarca (upper and lower extremity, scrotal swelling) as well as exertional dyspnea and bilateral calf pain and tenderness  -Ultrasound bilateral lower extremities (8/19) noted no evidence of dvt  -Albumin 1.3 (8/21)  -CT of the chest at outside facility is suspicion for empyema and dense right lobe infiltrates and GGO.     Acute respiratory failure in the setting of right-sided pneumonia and right-sided empyema  S/p complete lung decortication, marsupialization of abscess cavity, right lung wedge resection (8/22)  -Chest x-ray CT chest from [facility] on chart  -Vanc/Fagyl/Levoquin at [facility]>> will continue  -Pulmonary following  -Cardiothororacic surgery following  -ID following, continue vancomycin  -Nephro following  -Continue Mucinex   -critical care following    MRSA nasal colonization and concerns for infection  -ID following    Acute on chronic hyponatremia w hx Hypo-osmolality   -Na 127->128 (8/22)  -Follows w Dr Brunson, following  -1200 mL fluid restriction  -Hold salt tabs  -Holding IV Bumex    Subnephrotic proteinuria  -Mainly non-albuminous protein  -Pending: SPEP & SIFE   -Renal following     Acute hyperglycemia in the presence of diabetes mellitus type 2 and infection  -A1c 12.6 in July 2023  -takes januvia, metformin and amaryl at home, hold  -glu 70s-120s last 24 hours  -continue Sliding scale as ordered  -takes lantus 16u nightly at home  -decrease Lantus to 10 units nightly     Hypertension  -sbp 100s-160s last 24 hours  -Continue  Lisinopril   -Amlodipine on hold due to bilateral lower/upper extremity swelling  -start prn hydralazine     Chronic diastolic CHF  -Dc'd Lasix   -Holding Bumex  -No beta-blocker reported  -Continue lisinopril   -Holding amlodipine  -BNP 4433>4633 (8/19) will trend  -Echo July 2023 LVEF 65%    ARIANNE  -Needs to wear CPAP at night    Elevated bicarb  -Renal following, probaby comination respiratory acidosis & M. Alkalosis  -Started PO Diamox      Hyperlipidemia  -Lipid panel; chol 108, chol/hdl 4.9, hdl 22, calc ldl 76, calc non hdl 86, triglyceride 48 (8/18)  -Continue statin     Bipolar  -Continue Haldol PRN  -Continue Geodon      Paroxysmal A-fib /a flutter  -No anticoagulation reported  -No beta-blocker reported     COPD  -Quit smoking this past July  -Continue Atrovent inhaler, Prednisone, Pulmicort      Acute blood loss anemia on Chronic anemia  EBL 1400cc during procedure 8/22  -Hemoglobin 9.5->8.2->9.5 (8/22)  -No overt signs and symptoms of bleeding  -Transfuse if hemoglobin drops below 7 and symptomatic  -Continue oral iron      History of pyothorax July 2023  History of pleural effusions     DVT: SCDs, Lovenox     DISPO:  Pending clinical course   DURGA:  8/29    PCP:  Pcp, Verify     All patient's questions and concerns were addressed.  All labs and studies have been reviewed.  D/w nurse  A face to face visit took place today.    Elisabeth Vora DO  Internal Medicine Hospitalist Attending Physician  [facility]  Perfect serve or [phone]

## 2023-10-25 RX ORDER — ROPINIROLE 0.25 MG/1
TABLET, FILM COATED ORAL
Qty: 90 TABLET | Refills: 0 | Status: SHIPPED | OUTPATIENT
Start: 2023-10-25

## 2023-10-25 NOTE — TELEPHONE ENCOUNTER
LK pt, Twv on notes  Cleve Beavers called requesting a refill of the below medication which has been pended for you:     Requested Prescriptions     Pending Prescriptions Disp Refills    rOPINIRole (REQUIP) 0.25 MG tablet [Pharmacy Med Name: rOPINIRole HCl 0.25 MG Oral Tablet] 90 tablet 0     Sig: Take 1 tablet by mouth nightly       Last Appointment Date: 8/10/2023  Next Appointment Date: 11/13/2023    Allergies   Allergen Reactions    Atorvastatin      Other reaction(s): Muscle Pain    Codeine     Diltiazem Hcl Hives    Levofloxacin      Other reaction(s):  Intolerance-unknown    Pregabalin      lyrica    Simvastatin      Other reaction(s): Muscle Pain

## 2023-11-13 ENCOUNTER — OFFICE VISIT (OUTPATIENT)
Dept: FAMILY MEDICINE CLINIC | Age: 88
End: 2023-11-13
Payer: MEDICARE

## 2023-11-13 VITALS
HEIGHT: 61 IN | OXYGEN SATURATION: 94 % | BODY MASS INDEX: 17.79 KG/M2 | SYSTOLIC BLOOD PRESSURE: 122 MMHG | HEART RATE: 61 BPM | WEIGHT: 94.2 LBS | DIASTOLIC BLOOD PRESSURE: 64 MMHG

## 2023-11-13 DIAGNOSIS — Z23 NEED FOR VACCINATION: ICD-10-CM

## 2023-11-13 DIAGNOSIS — K59.03 DRUG-INDUCED CONSTIPATION: ICD-10-CM

## 2023-11-13 DIAGNOSIS — G89.29 CHRONIC BILATERAL LOW BACK PAIN WITHOUT SCIATICA: Primary | ICD-10-CM

## 2023-11-13 DIAGNOSIS — M54.50 CHRONIC BILATERAL LOW BACK PAIN WITHOUT SCIATICA: Primary | ICD-10-CM

## 2023-11-13 PROCEDURE — 1090F PRES/ABSN URINE INCON ASSESS: CPT | Performed by: FAMILY MEDICINE

## 2023-11-13 PROCEDURE — G8419 CALC BMI OUT NRM PARAM NOF/U: HCPCS | Performed by: FAMILY MEDICINE

## 2023-11-13 PROCEDURE — 90694 VACC AIIV4 NO PRSRV 0.5ML IM: CPT | Performed by: FAMILY MEDICINE

## 2023-11-13 PROCEDURE — 1036F TOBACCO NON-USER: CPT | Performed by: FAMILY MEDICINE

## 2023-11-13 PROCEDURE — G8484 FLU IMMUNIZE NO ADMIN: HCPCS | Performed by: FAMILY MEDICINE

## 2023-11-13 PROCEDURE — G8427 DOCREV CUR MEDS BY ELIG CLIN: HCPCS | Performed by: FAMILY MEDICINE

## 2023-11-13 PROCEDURE — 99213 OFFICE O/P EST LOW 20 MIN: CPT | Performed by: FAMILY MEDICINE

## 2023-11-13 PROCEDURE — 1123F ACP DISCUSS/DSCN MKR DOCD: CPT | Performed by: FAMILY MEDICINE

## 2023-11-13 PROCEDURE — PBSHW INFLUENZA, FLUAD, (AGE 65 Y+), IM, PF, 0.5 ML: Performed by: FAMILY MEDICINE

## 2023-11-13 RX ORDER — OXYCODONE AND ACETAMINOPHEN 7.5; 325 MG/1; MG/1
1 TABLET ORAL EVERY 6 HOURS PRN
Qty: 120 TABLET | Refills: 0 | Status: SHIPPED | OUTPATIENT
Start: 2023-11-13 | End: 2023-12-13

## 2023-11-13 RX ORDER — OXYCODONE AND ACETAMINOPHEN 7.5; 325 MG/1; MG/1
TABLET ORAL
COMMUNITY
Start: 2023-11-03 | End: 2023-11-13 | Stop reason: SDUPTHER

## 2023-11-13 RX ORDER — OXYCODONE AND ACETAMINOPHEN 7.5; 325 MG/1; MG/1
1 TABLET ORAL EVERY 6 HOURS PRN
Qty: 120 TABLET | Refills: 0 | Status: SHIPPED | OUTPATIENT
Start: 2023-11-13 | End: 2023-11-13 | Stop reason: SDUPTHER

## 2023-11-13 ASSESSMENT — ENCOUNTER SYMPTOMS
CONSTIPATION: 1
SHORTNESS OF BREATH: 0
COUGH: 0
CHEST TIGHTNESS: 0
WHEEZING: 0
ABDOMINAL PAIN: 1

## 2023-11-13 NOTE — PROGRESS NOTES
days. Max Daily Amount: 4 tablets     Dispense:  120 tablet     Refill:  0     Do not fill until 2/1/24       Patientgiven educational materials - see patient instructions. Discussed use, benefit,and side effects of prescribed medications. All patient questions answered. Ptvoiced understanding. Reviewed health maintenance. Instructed to continue currentmedications, diet and exercise. Patient agreed with treatment plan. Follow up asdirected.      Electronically signed by John Swenson MD on 11/13/2023 at 9:40 PM

## 2024-01-09 DIAGNOSIS — I10 ESSENTIAL HYPERTENSION: ICD-10-CM

## 2024-01-10 RX ORDER — CARVEDILOL 12.5 MG/1
TABLET ORAL
Qty: 180 TABLET | Refills: 0 | Status: SHIPPED | OUTPATIENT
Start: 2024-01-10

## 2024-01-10 RX ORDER — HYDRALAZINE HYDROCHLORIDE 25 MG/1
TABLET, FILM COATED ORAL
Qty: 90 TABLET | Refills: 0 | Status: SHIPPED | OUTPATIENT
Start: 2024-01-10

## 2024-01-10 NOTE — TELEPHONE ENCOUNTER
Lara called requesting a refill of the below medication which has been pended for you:     Requested Prescriptions     Pending Prescriptions Disp Refills    hydrALAZINE (APRESOLINE) 25 MG tablet [Pharmacy Med Name: hydrALAZINE HCl 25 MG Oral Tablet] 90 tablet 0     Sig: Take 1/2 (one-half) tablet by mouth twice daily    carvedilol (COREG) 12.5 MG tablet [Pharmacy Med Name: Carvedilol 12.5 MG Oral Tablet] 180 tablet 0     Sig: TAKE 1 TABLET BY MOUTH TWICE DAILY WITH MEALS       Last Appointment Date: 11/13/2023  Next Appointment Date: 2/13/2024    Allergies   Allergen Reactions    Atorvastatin      Other reaction(s): Muscle Pain    Codeine     Diltiazem Hcl Hives    Levofloxacin      Other reaction(s): Intolerance-unknown    Pregabalin      lyrica    Simvastatin      Other reaction(s): Muscle Pain

## 2024-01-17 ENCOUNTER — OFFICE VISIT (OUTPATIENT)
Dept: CARDIOLOGY | Age: 89
End: 2024-01-17
Payer: MEDICARE

## 2024-01-17 ENCOUNTER — OFFICE VISIT (OUTPATIENT)
Dept: PRIMARY CARE CLINIC | Age: 89
End: 2024-01-17
Payer: MEDICARE

## 2024-01-17 VITALS
BODY MASS INDEX: 18.06 KG/M2 | DIASTOLIC BLOOD PRESSURE: 58 MMHG | HEART RATE: 71 BPM | SYSTOLIC BLOOD PRESSURE: 134 MMHG | WEIGHT: 95.6 LBS | OXYGEN SATURATION: 93 % | TEMPERATURE: 97.2 F

## 2024-01-17 VITALS
HEART RATE: 76 BPM | WEIGHT: 96 LBS | SYSTOLIC BLOOD PRESSURE: 172 MMHG | DIASTOLIC BLOOD PRESSURE: 82 MMHG | HEIGHT: 61 IN | BODY MASS INDEX: 18.12 KG/M2

## 2024-01-17 DIAGNOSIS — I25.10 CORONARY ARTERY DISEASE INVOLVING NATIVE CORONARY ARTERY OF NATIVE HEART WITHOUT ANGINA PECTORIS: ICD-10-CM

## 2024-01-17 DIAGNOSIS — I50.33 ACUTE ON CHRONIC HEART FAILURE WITH PRESERVED EJECTION FRACTION (HCC): Primary | ICD-10-CM

## 2024-01-17 DIAGNOSIS — Z95.5 S/P CORONARY ARTERY STENT PLACEMENT: ICD-10-CM

## 2024-01-17 DIAGNOSIS — J06.9 UPPER RESPIRATORY TRACT INFECTION, UNSPECIFIED TYPE: Primary | ICD-10-CM

## 2024-01-17 DIAGNOSIS — I10 ESSENTIAL HYPERTENSION: ICD-10-CM

## 2024-01-17 PROBLEM — I50.22 CHRONIC SYSTOLIC (CONGESTIVE) HEART FAILURE (HCC): Status: ACTIVE | Noted: 2024-01-17

## 2024-01-17 PROCEDURE — 1090F PRES/ABSN URINE INCON ASSESS: CPT

## 2024-01-17 PROCEDURE — G8419 CALC BMI OUT NRM PARAM NOF/U: HCPCS

## 2024-01-17 PROCEDURE — 1036F TOBACCO NON-USER: CPT | Performed by: INTERNAL MEDICINE

## 2024-01-17 PROCEDURE — 1036F TOBACCO NON-USER: CPT

## 2024-01-17 PROCEDURE — G8484 FLU IMMUNIZE NO ADMIN: HCPCS

## 2024-01-17 PROCEDURE — 99204 OFFICE O/P NEW MOD 45 MIN: CPT | Performed by: INTERNAL MEDICINE

## 2024-01-17 PROCEDURE — 1123F ACP DISCUSS/DSCN MKR DOCD: CPT | Performed by: INTERNAL MEDICINE

## 2024-01-17 PROCEDURE — G8427 DOCREV CUR MEDS BY ELIG CLIN: HCPCS

## 2024-01-17 PROCEDURE — 1090F PRES/ABSN URINE INCON ASSESS: CPT | Performed by: INTERNAL MEDICINE

## 2024-01-17 PROCEDURE — 99213 OFFICE O/P EST LOW 20 MIN: CPT | Performed by: INTERNAL MEDICINE

## 2024-01-17 PROCEDURE — G8419 CALC BMI OUT NRM PARAM NOF/U: HCPCS | Performed by: INTERNAL MEDICINE

## 2024-01-17 PROCEDURE — 99213 OFFICE O/P EST LOW 20 MIN: CPT

## 2024-01-17 PROCEDURE — G8484 FLU IMMUNIZE NO ADMIN: HCPCS | Performed by: INTERNAL MEDICINE

## 2024-01-17 PROCEDURE — 99212 OFFICE O/P EST SF 10 MIN: CPT

## 2024-01-17 PROCEDURE — 1123F ACP DISCUSS/DSCN MKR DOCD: CPT

## 2024-01-17 PROCEDURE — G8427 DOCREV CUR MEDS BY ELIG CLIN: HCPCS | Performed by: INTERNAL MEDICINE

## 2024-01-17 RX ORDER — FUROSEMIDE 20 MG/1
10 TABLET ORAL DAILY
Qty: 30 TABLET | Refills: 3 | Status: SHIPPED | OUTPATIENT
Start: 2024-01-17

## 2024-01-17 RX ORDER — AMOXICILLIN 250 MG/1
250 CAPSULE ORAL 2 TIMES DAILY
Qty: 14 CAPSULE | Refills: 0 | Status: SHIPPED | OUTPATIENT
Start: 2024-01-17 | End: 2024-01-24

## 2024-01-17 ASSESSMENT — ENCOUNTER SYMPTOMS
SHORTNESS OF BREATH: 1
WHEEZING: 0
DIARRHEA: 0
SINUS PRESSURE: 0
CONSTIPATION: 0
NAUSEA: 1
VOMITING: 0
RHINORRHEA: 1
COUGH: 1
ALLERGIC/IMMUNOLOGIC NEGATIVE: 1
EYES NEGATIVE: 1
SINUS PAIN: 1

## 2024-01-17 NOTE — PATIENT INSTRUCTIONS
Take medication as prescribed  Complete full course of antibiotics  May continue to use mucinex and zyxal  May use tylenol/ ibuprofen for pain/ fever  If symptoms worsen follow up with PCP  Patient/ mother verbalized understanding and agrees with plan of care

## 2024-01-17 NOTE — PROGRESS NOTES
Today's Date: 1/17/2024  Patient's Name: Lara Reid  Patient's age: 89 y.o., 8/16/1928    Subjective:  The patient is a 89 y.o. year old, , female is in the office for for initial cardiac evaluation    Patient is 89 years old female with history of CAD and prior stenting 21 years ago at [facility] the details are not available to me at this time, history of hypertension and hyperlipidemia who presented to [facility] emergency room yesterday complaining of worsening shortness of breath associated with cough and yellow sputum production she was noted to have elevated BNP and pulmonary vascular congestion on chest x-ray and was referred to us for evaluation of congestive heart failure.  She denies any chest pain or discomfort she has orthopnea and sleeps in her recliner but denies any PND.  Denies any palpitations dizziness or syncope.      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    Past Surgical History:   has a past surgical history that includes Lumbar disc surgery (2012); Appendectomy (1948); Cardiac catheterization (04/2002); and Cataract removal with implant (Bilateral).    Home Medications:  Prior to Admission medications    Medication Sig Start Date End Date Taking? Authorizing Provider   hydrALAZINE (APRESOLINE) 25 MG tablet Take 1/2 (one-half) tablet by mouth twice daily 1/10/24  Yes Rachael Evangelista MD   carvedilol (COREG) 12.5 MG tablet TAKE 1 TABLET BY MOUTH TWICE DAILY WITH MEALS 1/10/24  Yes Rachael Evangelista MD   rOPINIRole (REQUIP) 0.25 MG tablet Take 1 tablet by mouth nightly 10/25/23  Yes Mar Dalal DO   busPIRone (BUSPAR) 10 MG tablet Take 1 tablet by mouth 3 times daily as needed (anxiety) 8/10/23  Yes Rachael Evangelista MD   lisinopril (PRINIVIL;ZESTRIL) 2.5 MG tablet Take

## 2024-01-17 NOTE — PROGRESS NOTES
Rachael PRATHER MD     Allergies   Allergen Reactions    Atorvastatin      Other reaction(s): Muscle Pain    Codeine     Diltiazem Hcl Hives    Levofloxacin      Other reaction(s): Intolerance-unknown    Pregabalin      lyrica    Simvastatin      Other reaction(s): Muscle Pain       Subjective:      Review of Systems   Constitutional: Negative.  Negative for activity change, appetite change, fatigue and fever.   HENT:  Positive for congestion, rhinorrhea and sinus pain. Negative for ear discharge, ear pain, postnasal drip and sinus pressure.    Eyes: Negative.    Respiratory:  Positive for cough and shortness of breath. Negative for wheezing.    Cardiovascular: Negative.    Gastrointestinal:  Positive for nausea. Negative for constipation, diarrhea and vomiting.   Endocrine: Negative.    Genitourinary: Negative.    Musculoskeletal: Negative.    Skin: Negative.    Allergic/Immunologic: Negative.    Neurological:  Positive for headaches.   Hematological: Negative.    Psychiatric/Behavioral: Negative.         Objective:     Physical Exam  Constitutional:       General: She is not in acute distress.     Appearance: Normal appearance.   HENT:      Right Ear: Hearing, tympanic membrane, ear canal and external ear normal.      Left Ear: Hearing, tympanic membrane, ear canal and external ear normal.      Nose: No congestion or rhinorrhea.      Right Turbinates: Not pale.      Left Turbinates: Not pale.      Right Sinus: No maxillary sinus tenderness or frontal sinus tenderness.      Left Sinus: No maxillary sinus tenderness or frontal sinus tenderness.      Mouth/Throat:      Pharynx: No oropharyngeal exudate or posterior oropharyngeal erythema.   Cardiovascular:      Rate and Rhythm: Normal rate and regular rhythm.      Pulses: Normal pulses.      Heart sounds: Normal heart sounds.   Pulmonary:      Effort: Pulmonary effort is normal.      Breath sounds: Examination of the right-lower field reveals rhonchi. Examination of the

## 2024-01-19 ENCOUNTER — HOSPITAL ENCOUNTER (OUTPATIENT)
Age: 89
End: 2024-01-19
Attending: INTERNAL MEDICINE
Payer: MEDICARE

## 2024-01-19 VITALS
HEART RATE: 68 BPM | DIASTOLIC BLOOD PRESSURE: 85 MMHG | HEIGHT: 61 IN | BODY MASS INDEX: 17.94 KG/M2 | WEIGHT: 95 LBS | SYSTOLIC BLOOD PRESSURE: 194 MMHG

## 2024-01-19 DIAGNOSIS — I10 ESSENTIAL HYPERTENSION: ICD-10-CM

## 2024-01-19 DIAGNOSIS — Z95.5 S/P CORONARY ARTERY STENT PLACEMENT: ICD-10-CM

## 2024-01-19 DIAGNOSIS — I25.10 CORONARY ARTERY DISEASE INVOLVING NATIVE CORONARY ARTERY OF NATIVE HEART WITHOUT ANGINA PECTORIS: ICD-10-CM

## 2024-01-19 DIAGNOSIS — I50.33 ACUTE ON CHRONIC HEART FAILURE WITH PRESERVED EJECTION FRACTION (HCC): ICD-10-CM

## 2024-01-19 LAB
ECHO AO ROOT DIAM: 2.5 CM
ECHO AO ROOT INDEX: 1.81 CM/M2
ECHO AV AREA PEAK VELOCITY: 1.3 CM2
ECHO AV AREA VTI: 1.3 CM2
ECHO AV AREA/BSA PEAK VELOCITY: 0.9 CM2/M2
ECHO AV AREA/BSA VTI: 0.9 CM2/M2
ECHO AV MEAN GRADIENT: 15 MMHG
ECHO AV MEAN VELOCITY: 1.8 M/S
ECHO AV PEAK GRADIENT: 29 MMHG
ECHO AV PEAK VELOCITY: 2.7 M/S
ECHO AV VELOCITY RATIO: 0.41
ECHO AV VTI: 66.2 CM
ECHO BSA: 1.36 M2
ECHO EST RA PRESSURE: 3 MMHG
ECHO LA AREA 2C: 28.5 CM2
ECHO LA AREA 4C: 27.8 CM2
ECHO LA DIAMETER INDEX: 2.61 CM/M2
ECHO LA DIAMETER: 3.6 CM
ECHO LA MAJOR AXIS: 6.6 CM
ECHO LA MINOR AXIS: 7 CM
ECHO LA TO AORTIC ROOT RATIO: 1.44
ECHO LA VOL BP: 96 ML (ref 22–52)
ECHO LA VOL MOD A2C: 93 ML (ref 22–52)
ECHO LA VOL MOD A4C: 93 ML (ref 22–52)
ECHO LA VOL/BSA BIPLANE: 70 ML/M2 (ref 16–34)
ECHO LA VOLUME INDEX MOD A2C: 67 ML/M2 (ref 16–34)
ECHO LA VOLUME INDEX MOD A4C: 67 ML/M2 (ref 16–34)
ECHO LV E' LATERAL VELOCITY: 4 CM/S
ECHO LV E' SEPTAL VELOCITY: 4 CM/S
ECHO LV EJECTION FRACTION BIPLANE: 76 % (ref 55–100)
ECHO LV FRACTIONAL SHORTENING: 37 % (ref 28–44)
ECHO LV INTERNAL DIMENSION DIASTOLE INDEX: 3.12 CM/M2
ECHO LV INTERNAL DIMENSION DIASTOLIC: 4.3 CM (ref 3.9–5.3)
ECHO LV INTERNAL DIMENSION SYSTOLIC INDEX: 1.96 CM/M2
ECHO LV INTERNAL DIMENSION SYSTOLIC: 2.7 CM
ECHO LV IVSD: 0.9 CM (ref 0.6–0.9)
ECHO LV MASS 2D: 123.3 G (ref 67–162)
ECHO LV MASS INDEX 2D: 89.3 G/M2 (ref 43–95)
ECHO LV POSTERIOR WALL DIASTOLIC: 0.9 CM (ref 0.6–0.9)
ECHO LV RELATIVE WALL THICKNESS RATIO: 0.42
ECHO LVOT AREA: 3.1 CM2
ECHO LVOT AV VTI INDEX: 0.42
ECHO LVOT DIAM: 2 CM
ECHO LVOT MEAN GRADIENT: 3 MMHG
ECHO LVOT PEAK GRADIENT: 5 MMHG
ECHO LVOT PEAK VELOCITY: 1.1 M/S
ECHO LVOT STROKE VOLUME INDEX: 62.6 ML/M2
ECHO LVOT SV: 86.4 ML
ECHO LVOT VTI: 27.5 CM
ECHO MV A VELOCITY: 1.49 M/S
ECHO MV AREA VTI: 1.8 CM2
ECHO MV E DECELERATION TIME (DT): 254 MS
ECHO MV E VELOCITY: 1.87 M/S
ECHO MV E/A RATIO: 1.26
ECHO MV E/E' LATERAL: 46.75
ECHO MV E/E' RATIO (AVERAGED): 46.75
ECHO MV LVOT VTI INDEX: 1.78
ECHO MV MAX VELOCITY: 1.9 M/S
ECHO MV MEAN GRADIENT: 5 MMHG
ECHO MV MEAN VELOCITY: 1 M/S
ECHO MV PEAK GRADIENT: 15 MMHG
ECHO MV VTI: 49 CM
ECHO PV MAX VELOCITY: 1.1 M/S
ECHO PV PEAK GRADIENT: 5 MMHG
ECHO RIGHT VENTRICULAR SYSTOLIC PRESSURE (RVSP): 40 MMHG
ECHO TV PEAK GRADIENT: 2 MMHG
ECHO TV REGURGITANT MAX VELOCITY: 3.06 M/S
ECHO TV REGURGITANT PEAK GRADIENT: 37 MMHG

## 2024-01-19 PROCEDURE — 93306 TTE W/DOPPLER COMPLETE: CPT | Performed by: INTERNAL MEDICINE

## 2024-01-19 PROCEDURE — 93306 TTE W/DOPPLER COMPLETE: CPT

## 2024-01-22 ENCOUNTER — TELEPHONE (OUTPATIENT)
Dept: CARDIOLOGY | Age: 89
End: 2024-01-22

## 2024-01-22 NOTE — TELEPHONE ENCOUNTER
Please review echo and advise.     Interpretation Summary         Left Ventricle: Hyperdynamic left ventricular systolic function with a visually estimated EF of 70 - 75%. EF by 2D Simpsons Biplane is 76%. Left ventricle size is normal. Normal wall thickness. Normal wall motion. Abnormal diastolic function.    Aortic Valve: Trileaflet valve. Calcified cusp. Mild stenosis of the aortic valve. AV mean gradient is 15 mmHg. AV area by continuity VTI is 1.3 cm2.    Mitral Valve: Mild to moderate regurgitation. (2 jets). Mild stenosis noted.    Left Atrium: Left atrium is severely dilated.    Right Atrium: Right atrium is dilated.    Image quality is fair.

## 2024-01-24 NOTE — TELEPHONE ENCOUNTER
Notified patient of ECHO results. Instructed patient to keep upcoming appointment with cardiologist and to call our office for any questions. Patient also instructed to utilize the E.D. for any emergent health issues that may arise.

## 2024-01-24 NOTE — TELEPHONE ENCOUNTER
Spoke to dayanara haines. Patient wants to go off the water pill. It makes her confused.     Call [facility] at [phone]

## 2024-01-25 RX ORDER — ROPINIROLE 0.25 MG/1
TABLET, FILM COATED ORAL
Qty: 90 TABLET | Refills: 1 | Status: SHIPPED | OUTPATIENT
Start: 2024-01-25

## 2024-01-25 NOTE — TELEPHONE ENCOUNTER
Lara called requesting a refill of the below medication which has been pended for you:     Requested Prescriptions     Pending Prescriptions Disp Refills    rOPINIRole (REQUIP) 0.25 MG tablet [Pharmacy Med Name: rOPINIRole HCl 0.25 MG Oral Tablet] 90 tablet 0     Sig: Take 1 tablet by mouth nightly       Last Appointment Date: 11/13/2023  Next Appointment Date: 2/13/2024    Allergies   Allergen Reactions    Atorvastatin      Other reaction(s): Muscle Pain    Codeine     Diltiazem Hcl Hives    Levofloxacin      Other reaction(s): Intolerance-unknown    Pregabalin      lyrica    Simvastatin      Other reaction(s): Muscle Pain

## 2024-02-06 ENCOUNTER — HOSPITAL ENCOUNTER (EMERGENCY)
Age: 89
Discharge: HOME | End: 2024-02-06
Attending: EMERGENCY MEDICINE
Payer: MEDICARE

## 2024-02-06 ENCOUNTER — APPOINTMENT (OUTPATIENT)
Dept: GENERAL RADIOLOGY | Age: 89
End: 2024-02-06
Payer: MEDICARE

## 2024-02-06 VITALS
BODY MASS INDEX: 17.95 KG/M2 | DIASTOLIC BLOOD PRESSURE: 47 MMHG | OXYGEN SATURATION: 95 % | HEART RATE: 66 BPM | SYSTOLIC BLOOD PRESSURE: 162 MMHG | WEIGHT: 95 LBS | TEMPERATURE: 97.9 F | RESPIRATION RATE: 18 BRPM

## 2024-02-06 DIAGNOSIS — I50.9 CONGESTIVE HEART FAILURE, UNSPECIFIED HF CHRONICITY, UNSPECIFIED HEART FAILURE TYPE (HCC): Primary | ICD-10-CM

## 2024-02-06 LAB
ANION GAP SERPL CALCULATED.3IONS-SCNC: 11 MMOL/L (ref 9–17)
BASOPHILS # BLD: 0.03 K/UL (ref 0–0.2)
BASOPHILS NFR BLD: 0 % (ref 0–2)
BNP SERPL-MCNC: 2306 PG/ML
BUN SERPL-MCNC: 24 MG/DL (ref 8–23)
BUN/CREAT SERPL: 27 (ref 9–20)
CALCIUM SERPL-MCNC: 8.7 MG/DL (ref 8.6–10.4)
CHLORIDE SERPL-SCNC: 96 MMOL/L (ref 98–107)
CO2 SERPL-SCNC: 28 MMOL/L (ref 20–31)
CREAT SERPL-MCNC: 0.9 MG/DL (ref 0.5–0.9)
EOSINOPHIL # BLD: 0.04 K/UL (ref 0–0.44)
EOSINOPHILS RELATIVE PERCENT: 0 % (ref 1–4)
ERYTHROCYTE [DISTWIDTH] IN BLOOD BY AUTOMATED COUNT: 13.2 % (ref 11.8–14.4)
FLUAV AG SPEC QL: NEGATIVE
FLUBV AG SPEC QL: NEGATIVE
GFR SERPL CREATININE-BSD FRML MDRD: 59 ML/MIN/1.73M2
GLUCOSE SERPL-MCNC: 170 MG/DL (ref 70–99)
HCT VFR BLD AUTO: 35.5 % (ref 36.3–47.1)
HGB BLD-MCNC: 10.7 G/DL (ref 11.9–15.1)
IMM GRANULOCYTES # BLD AUTO: <0.03 K/UL (ref 0–0.3)
IMM GRANULOCYTES NFR BLD: 0 %
LYMPHOCYTES NFR BLD: 1.26 K/UL (ref 1.1–3.7)
LYMPHOCYTES RELATIVE PERCENT: 12 % (ref 24–43)
MCH RBC QN AUTO: 29.3 PG (ref 25.2–33.5)
MCHC RBC AUTO-ENTMCNC: 30.1 G/DL (ref 25.2–33.5)
MCV RBC AUTO: 97.3 FL (ref 82.6–102.9)
MONOCYTES NFR BLD: 0.87 K/UL (ref 0.1–1.2)
MONOCYTES NFR BLD: 8 % (ref 3–12)
NEUTROPHILS NFR BLD: 80 % (ref 36–65)
NEUTS SEG NFR BLD: 8.67 K/UL (ref 1.5–8.1)
NRBC BLD-RTO: 0 PER 100 WBC
PLATELET # BLD AUTO: 254 K/UL (ref 138–453)
PMV BLD AUTO: 10.9 FL (ref 8.1–13.5)
POTASSIUM SERPL-SCNC: 4.8 MMOL/L (ref 3.7–5.3)
RBC # BLD AUTO: 3.65 M/UL (ref 3.95–5.11)
SARS-COV-2 RDRP RESP QL NAA+PROBE: NOT DETECTED
SODIUM SERPL-SCNC: 135 MMOL/L (ref 135–144)
SPECIMEN DESCRIPTION: NORMAL
TROPONIN I SERPL HS-MCNC: 53 NG/L (ref 0–14)
TROPONIN I SERPL HS-MCNC: 54 NG/L (ref 0–14)
WBC OTHER # BLD: 10.9 K/UL (ref 3.5–11.3)

## 2024-02-06 PROCEDURE — 84484 ASSAY OF TROPONIN QUANT: CPT

## 2024-02-06 PROCEDURE — 85025 COMPLETE CBC W/AUTO DIFF WBC: CPT

## 2024-02-06 PROCEDURE — 99285 EMERGENCY DEPT VISIT HI MDM: CPT

## 2024-02-06 PROCEDURE — 80048 BASIC METABOLIC PNL TOTAL CA: CPT

## 2024-02-06 PROCEDURE — 87635 SARS-COV-2 COVID-19 AMP PRB: CPT

## 2024-02-06 PROCEDURE — 36415 COLL VENOUS BLD VENIPUNCTURE: CPT

## 2024-02-06 PROCEDURE — 96374 THER/PROPH/DIAG INJ IV PUSH: CPT

## 2024-02-06 PROCEDURE — 93005 ELECTROCARDIOGRAM TRACING: CPT | Performed by: EMERGENCY MEDICINE

## 2024-02-06 PROCEDURE — 71046 X-RAY EXAM CHEST 2 VIEWS: CPT

## 2024-02-06 PROCEDURE — 6360000002 HC RX W HCPCS: Performed by: EMERGENCY MEDICINE

## 2024-02-06 PROCEDURE — 83880 ASSAY OF NATRIURETIC PEPTIDE: CPT

## 2024-02-06 PROCEDURE — 87804 INFLUENZA ASSAY W/OPTIC: CPT

## 2024-02-06 RX ORDER — FUROSEMIDE 10 MG/ML
20 INJECTION INTRAMUSCULAR; INTRAVENOUS ONCE
Status: COMPLETED | OUTPATIENT
Start: 2024-02-06 | End: 2024-02-06

## 2024-02-06 RX ADMIN — FUROSEMIDE 20 MG: 10 INJECTION, SOLUTION INTRAMUSCULAR; INTRAVENOUS at 16:10

## 2024-02-06 ASSESSMENT — LIFESTYLE VARIABLES: HOW OFTEN DO YOU HAVE A DRINK CONTAINING ALCOHOL: NEVER

## 2024-02-06 ASSESSMENT — PAIN - FUNCTIONAL ASSESSMENT: PAIN_FUNCTIONAL_ASSESSMENT: NONE - DENIES PAIN

## 2024-02-06 NOTE — DISCHARGE INSTRUCTIONS
Continue taking all the medications as prescribed by your doctor.  Follow-up with your primary care doctor in the morning for reevaluation.  Return to the emergency department with any problems or concerns as discussed.

## 2024-02-06 NOTE — ED PROVIDER NOTES
[facility]  [address] [address]  Phone: [phone]  EMERGENCY DEPARTMENT ENCOUNTER      Pt Name: Lara Reid  MRN: [id number]  Birthdate 8/16/1928  Date of evaluation: 2/6/2024    CHIEF COMPLAINT       Chief Complaint   Patient presents with    Shortness of Breath       HISTORY OF PRESENT ILLNESS    Lara Reid is a 89 y.o. female who presents to the emergency department with a complaint of low oxygen saturation read on their home pulse oximetry.  Patient has a history of CHF, and COPD.  She is on 2 L of oxygen at home as needed.  Daughter has been monitoring her pulse oximetry and it has been reading 88-82 while she is on oxygen at home.  She got concerned and brought her for evaluation.  Patient states she always has a cough does not have anything different than normal.  Cough is nonproductive.  She is taking Lasix as normal urinary output.  Denies any fever, or chills.  Denies any chest pain or palpitations.  She denies any dizziness, lightheadedness.  She has been eating and drinking well.  She has not had any fall or trauma she does not have any complaints on arrival to the emergency department.  The patient walked into the emergency department across the jordan to her room without any oxygen and her oxygen saturation was 95% on room air.    REVIEW OF SYSTEMS     Review of Systems   All other systems reviewed and are negative.    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    SURGICAL HISTORY      has a past surgical history that includes Lumbar disc surgery (2012); Appendectomy (1948); Cardiac catheterization (04/2002); and Cataract removal with implant (Bilateral).    CURRENT MEDICATIONS       Discharge Medication List as of 2/6/2024  4:11 PM        CONTINUE these medications which have NOT

## 2024-02-08 LAB
EKG ATRIAL RATE: 62 BPM
EKG P AXIS: 57 DEGREES
EKG P-R INTERVAL: 162 MS
EKG Q-T INTERVAL: 450 MS
EKG QRS DURATION: 118 MS
EKG QTC CALCULATION (BAZETT): 456 MS
EKG R AXIS: 109 DEGREES
EKG T AXIS: 12 DEGREES
EKG VENTRICULAR RATE: 62 BPM

## 2024-02-09 ENCOUNTER — CARE COORDINATION (OUTPATIENT)
Dept: CARE COORDINATION | Age: 89
End: 2024-02-09

## 2024-02-09 ENCOUNTER — TELEPHONE (OUTPATIENT)
Dept: FAMILY MEDICINE CLINIC | Age: 89
End: 2024-02-09

## 2024-02-09 DIAGNOSIS — I50.22 CHRONIC SYSTOLIC (CONGESTIVE) HEART FAILURE (HCC): Primary | ICD-10-CM

## 2024-02-09 DIAGNOSIS — J41.8 MIXED SIMPLE AND MUCOPURULENT CHRONIC BRONCHITIS (HCC): ICD-10-CM

## 2024-02-09 NOTE — CARE COORDINATION
Ambulatory Care Coordination  ED Follow up Call    2/9/2024- spoke with Lara. She is doing well. She is working with [facility] for portable oxygen. Sp02 is 94%. She is on 2 L at night and uses prn through out the day.   Wt stable 94#- she does not weigh self daily.   She lives with her daughter. They were in agreement to another call next week. This writer will F/U and assess for ACM.   She will keep apt with PCP 2/13/20254.     Reason for ED visit:  CHF    Status:     improved    Did you call your PCP prior to going to the ED?  No      Did you receive a discharge instructions from the Emergency Room? Yes  Review of Instructions:     Understands what to report/when to return?:  Yes   Understands discharge instructions?:  Yes   Following discharge instructions?:  Yes       Are there any new complaints of pain? No  New Pain Meds? N/A    Constipation prophylaxis needed?  N/A    If you have a wound is the dressing clean, dry, and intact? N/A  Understands wound care regimen? N/A    Are there any other complaints/concerns that you wish to tell your provider?   No    FU appts/Provider:    Future Appointments   Date Time Provider Department Center   2/13/2024  9:20 AM [facility]   2/21/2024 11:30 AM Rocky Muir MD [facility]

## 2024-02-09 NOTE — TELEPHONE ENCOUNTER
Patient requested having portal O2 available, now that she is needing it continuously. She has been in [facility] ER and our ER in the past week for CHF exacerbation. I told her that we will send a script for portable O2 to her pharmacy.

## 2024-02-13 ENCOUNTER — OFFICE VISIT (OUTPATIENT)
Dept: FAMILY MEDICINE CLINIC | Age: 89
End: 2024-02-13
Payer: MEDICARE

## 2024-02-13 VITALS
HEART RATE: 56 BPM | HEIGHT: 61 IN | BODY MASS INDEX: 17.61 KG/M2 | WEIGHT: 93.3 LBS | DIASTOLIC BLOOD PRESSURE: 58 MMHG | SYSTOLIC BLOOD PRESSURE: 142 MMHG | OXYGEN SATURATION: 96 %

## 2024-02-13 DIAGNOSIS — F11.99 OPIOID USE, UNSPECIFIED WITH UNSPECIFIED OPIOID-INDUCED DISORDER (HCC): ICD-10-CM

## 2024-02-13 DIAGNOSIS — M54.50 CHRONIC BILATERAL LOW BACK PAIN WITHOUT SCIATICA: ICD-10-CM

## 2024-02-13 DIAGNOSIS — G89.29 CHRONIC BILATERAL LOW BACK PAIN WITHOUT SCIATICA: ICD-10-CM

## 2024-02-13 DIAGNOSIS — Z00.00 MEDICARE ANNUAL WELLNESS VISIT, SUBSEQUENT: Primary | ICD-10-CM

## 2024-02-13 DIAGNOSIS — H61.23 BILATERAL IMPACTED CERUMEN: ICD-10-CM

## 2024-02-13 DIAGNOSIS — J41.8 MIXED SIMPLE AND MUCOPURULENT CHRONIC BRONCHITIS (HCC): ICD-10-CM

## 2024-02-13 PROBLEM — I50.9 NEW ONSET OF CONGESTIVE HEART FAILURE (HCC): Status: RESOLVED | Noted: 2021-02-11 | Resolved: 2024-02-13

## 2024-02-13 PROBLEM — I25.2 H/O NON-ST ELEVATION MYOCARDIAL INFARCTION (NSTEMI): Status: ACTIVE | Noted: 2020-12-26

## 2024-02-13 PROBLEM — U07.1 COVID-19: Status: RESOLVED | Noted: 2020-12-26 | Resolved: 2024-02-13

## 2024-02-13 PROCEDURE — 3023F SPIROM DOC REV: CPT | Performed by: FAMILY MEDICINE

## 2024-02-13 PROCEDURE — G8484 FLU IMMUNIZE NO ADMIN: HCPCS | Performed by: FAMILY MEDICINE

## 2024-02-13 PROCEDURE — G8427 DOCREV CUR MEDS BY ELIG CLIN: HCPCS | Performed by: FAMILY MEDICINE

## 2024-02-13 PROCEDURE — 1036F TOBACCO NON-USER: CPT | Performed by: FAMILY MEDICINE

## 2024-02-13 PROCEDURE — G0439 PPPS, SUBSEQ VISIT: HCPCS | Performed by: FAMILY MEDICINE

## 2024-02-13 PROCEDURE — 99213 OFFICE O/P EST LOW 20 MIN: CPT | Performed by: FAMILY MEDICINE

## 2024-02-13 PROCEDURE — G8419 CALC BMI OUT NRM PARAM NOF/U: HCPCS | Performed by: FAMILY MEDICINE

## 2024-02-13 PROCEDURE — 1090F PRES/ABSN URINE INCON ASSESS: CPT | Performed by: FAMILY MEDICINE

## 2024-02-13 PROCEDURE — 1123F ACP DISCUSS/DSCN MKR DOCD: CPT | Performed by: FAMILY MEDICINE

## 2024-02-13 RX ORDER — GABAPENTIN 300 MG/1
300 CAPSULE ORAL 2 TIMES DAILY
Qty: 180 CAPSULE | Refills: 0 | Status: SHIPPED | OUTPATIENT
Start: 2024-02-13 | End: 2024-05-13

## 2024-02-13 RX ORDER — OXYCODONE AND ACETAMINOPHEN 7.5; 325 MG/1; MG/1
1 TABLET ORAL EVERY 6 HOURS PRN
Qty: 120 TABLET | Refills: 0 | Status: SHIPPED | OUTPATIENT
Start: 2024-02-13 | End: 2024-03-14

## 2024-02-13 RX ORDER — OXYCODONE AND ACETAMINOPHEN 7.5; 325 MG/1; MG/1
1 TABLET ORAL EVERY 6 HOURS PRN
Qty: 120 TABLET | Refills: 0 | Status: SHIPPED | OUTPATIENT
Start: 2024-02-13 | End: 2024-02-13 | Stop reason: SDUPTHER

## 2024-02-13 NOTE — PATIENT INSTRUCTIONS
Limit alcohol to 2 drinks a day for men and 1 drink a day for women. Too much alcohol can cause health problems.     Manage other health problems such as diabetes, high blood pressure, and high cholesterol. If you think you may have a problem with alcohol or drug use, talk to your doctor.   Medicines    Take your medicines exactly as prescribed. Call your doctor if you think you are having a problem with your medicine.     If your doctor recommends aspirin, take the amount directed each day. Make sure you take aspirin and not another kind of pain reliever, such as acetaminophen (Tylenol).   When should you call for help?   Call 911 if you have symptoms of a heart attack. These may include:    Chest pain or pressure, or a strange feeling in the chest.     Sweating.     Shortness of breath.     Pain, pressure, or a strange feeling in the back, neck, jaw, or upper belly or in one or both shoulders or arms.     Lightheadedness or sudden weakness.     A fast or irregular heartbeat.   After you call 911, the  may tell you to chew 1 adult-strength or 2 to 4 low-dose aspirin. Wait for an ambulance. Do not try to drive yourself.  Watch closely for changes in your health, and be sure to contact your doctor if you have any problems.  Where can you learn more?  Go to https://www.[employer].net/patientEd and enter F075 to learn more about \"A Healthy Heart: Care Instructions.\"  Current as of: June 25, 2023               Content Version: 13.9  © 2006-2023 [employer].   Care instructions adapted under license by [employer]. If you have questions about a medical condition or this instruction, always ask your healthcare professional. [employer] disclaims any warranty or liability for your use of this information.      Personalized Preventive Plan for Lara Reid - 2/13/2024  Medicare offers a range of preventive health benefits. Some of the tests and screenings are paid in full while other may

## 2024-02-13 NOTE — PROGRESS NOTES
Medicare Annual Wellness Visit    Lara Reid is here for Medicare AWV    Assessment & Plan   Medicare annual wellness visit, subsequent  Mixed simple and mucopurulent chronic bronchitis (HCC)  Opioid use, unspecified with unspecified opioid-induced disorder (HCC)  Chronic bilateral low back pain without sciatica  -     gabapentin (NEURONTIN) 300 MG capsule; Take 1 capsule by mouth 2 times daily for 90 days., Disp-180 capsule, R-0Normal  -     oxyCODONE-acetaminophen (PERCOCET) 7.5-325 MG per tablet; Take 1 tablet by mouth every 6 hours as needed for Pain for up to 30 days. Max Daily Amount: 4 tablets, Disp-120 tablet, R-0Normal  Bilateral impacted cerumen    Recommendations for Preventive Services Due: see orders and patient instructions/AVS.  Recommended screening schedule for the next 5-10 years is provided to the patient in written form: see Patient Instructions/AVS.     Return in about 3 months (around 5/13/2024) for Back pain follow up.     Subjective       Patient's complete Health Risk Assessment and screening values have been reviewed and are found in Flowsheets. The following problems were reviewed today and where indicated follow up appointments were made and/or referrals ordered.    Positive Risk Factor Screenings with Interventions:       Cognitive:   Clock Drawing Test (CDT): (!) Abnormal  Words recalled: 2 Words Recalled  Total Score: (!) 2  Total Score Interpretation: Abnormal Mini-Cog  Interventions:  Patient declines any further evaluation or treatment        Controlled Medication Review:      Today's Pain Level: No data recorded   Opioid Risk: (Low risk score <55) Opioid risk score: 54    Patient is low risk for opioid use disorder or overdose.    Last PDMP [place] as Reviewed:  Review User Review Instant Review Result   IFEANYI LEE 2/13/2024 10:09 AM     Reviewed PDMP [1]     Last Controlled Substance Monitoring Documentation      Flowsheet Row Office Visit from 2/13/2024 in [facility] 
so I sent in debrox to help soften the wax so it is more likely to come out at her next visit.     Return in about 3 months (around 5/13/2024) for Back pain follow up.      Orders Placed This Encounter   Medications    DISCONTD: oxyCODONE-acetaminophen (PERCOCET) 7.5-325 MG per tablet     Sig: Take 1 tablet by mouth every 6 hours as needed for Pain for up to 30 days. Max Daily Amount: 4 tablets     Dispense:  120 tablet     Refill:  0     Do not fill 3/1/24    gabapentin (NEURONTIN) 300 MG capsule     Sig: Take 1 capsule by mouth 2 times daily for 90 days.     Dispense:  180 capsule     Refill:  0    carbamide peroxide (DEBROX) 6.5 % otic solution     Sig: Place 5 drops in ear(s) 2 times daily     Dispense:  15 mL     Refill:  0    DISCONTD: oxyCODONE-acetaminophen (PERCOCET) 7.5-325 MG per tablet     Sig: Take 1 tablet by mouth every 6 hours as needed for Pain for up to 30 days. Max Daily Amount: 4 tablets     Dispense:  120 tablet     Refill:  0     Do not fill 3/30/24    oxyCODONE-acetaminophen (PERCOCET) 7.5-325 MG per tablet     Sig: Take 1 tablet by mouth every 6 hours as needed for Pain for up to 30 days. Max Daily Amount: 4 tablets     Dispense:  120 tablet     Refill:  0     Do not fill 4/29/24       Patientgiven educational materials - see patient instructions.  Discussed use, benefit,and side effects of prescribed medications.  All patient questions answered. Ptvoiced understanding. Reviewed health maintenance.  Instructed to continue currentmedications, diet and exercise.  Patient agreed with treatment plan. Follow up asdirected.     Electronically signed by Rachael Evangelista MD on 2/13/2024 at 10:19 PM

## 2024-02-14 ENCOUNTER — CARE COORDINATION (OUTPATIENT)
Dept: CARE COORDINATION | Age: 89
End: 2024-02-14

## 2024-02-14 NOTE — CARE COORDINATION
2/14/2024- 11:20 am  Left HIPAA compliant voice message requesting return call @ [phone] to assess for ACM.        Future Appointments   Date Time Provider Department Center   2/21/2024 11:30 AM Rocky Muir MD [facility]   5/17/2024  8:40 AM [facility]

## 2024-02-15 NOTE — TELEPHONE ENCOUNTER
Printed and placed on MD desk for signature but PCP declined to sign. She wants Dr. Bailey to be the one who fills this out.   She has recently starting living at her daughter, Aline [place]. Did you try Brittany's number? If you get a hold of her, but please let her know that I'm still concerned. I don't think the anxiety med they gave her in the hospital is the best option because it shouldn't really be taken with the percocet at least not long term and it looks like she was a lot more anemic the morning that she was discharged. Is her belly any better? If not I would recommend that she see our general surgeons for recommendations.

## 2024-02-16 ENCOUNTER — CARE COORDINATION (OUTPATIENT)
Dept: CARE COORDINATION | Age: 89
End: 2024-02-16

## 2024-02-16 NOTE — CARE COORDINATION
2/14/2024- 11:20 am  Left HIPAA compliant voice message requesting return call @ [phone] to assess for ACM.    2/16/2024- spoke with Lara. (Daughter was also in background). She saw PCP.   She stated she is doing well.   No swelling feet and ankles and no concerns with breathing.   Discussed ACM- she declined further calls. Her daughter wrote down this writer's contact information and will reach out if needed.     Future Appointments   Date Time Provider Department Center   2/21/2024 11:30 AM [facility]   5/17/2024  8:40 AM [facility]

## 2024-02-19 DIAGNOSIS — G89.29 CHRONIC BILATERAL LOW BACK PAIN WITHOUT SCIATICA: ICD-10-CM

## 2024-02-19 DIAGNOSIS — M54.50 CHRONIC BILATERAL LOW BACK PAIN WITHOUT SCIATICA: ICD-10-CM

## 2024-02-19 RX ORDER — AMLODIPINE BESYLATE 5 MG/1
TABLET ORAL
Qty: 180 TABLET | Refills: 0 | Status: SHIPPED | OUTPATIENT
Start: 2024-02-19

## 2024-02-19 RX ORDER — GABAPENTIN 300 MG/1
600 CAPSULE ORAL 2 TIMES DAILY
Qty: 120 CAPSULE | Refills: 0 | OUTPATIENT
Start: 2024-02-19

## 2024-02-19 RX ORDER — LISINOPRIL 2.5 MG/1
TABLET ORAL
Qty: 90 TABLET | Refills: 0 | Status: SHIPPED | OUTPATIENT
Start: 2024-02-19

## 2024-02-19 RX ORDER — ISOSORBIDE MONONITRATE 30 MG/1
TABLET, EXTENDED RELEASE ORAL
Qty: 90 TABLET | Refills: 0 | Status: SHIPPED | OUTPATIENT
Start: 2024-02-19

## 2024-02-19 NOTE — TELEPHONE ENCOUNTER
Lara called requesting a refill of the below medication which has been pended for you:     Requested Prescriptions     Pending Prescriptions Disp Refills    lisinopril (PRINIVIL;ZESTRIL) 2.5 MG tablet [Pharmacy Med Name: Lisinopril 2.5 MG Oral Tablet] 90 tablet 0     Sig: Take 1 tablet by mouth once daily    amLODIPine (NORVASC) 5 MG tablet [Pharmacy Med Name: amLODIPine Besylate 5 MG Oral Tablet] 180 tablet 0     Sig: Take 1 tablet by mouth twice daily    isosorbide mononitrate (IMDUR) 30 MG extended release tablet [Pharmacy Med Name: Isosorbide Mononitrate ER 30 MG Oral Tablet Extended Release 24 Hour] 90 tablet 0     Sig: Take 1 tablet by mouth once daily     Refused Prescriptions Disp Refills    gabapentin (NEURONTIN) 300 MG capsule [Pharmacy Med Name: Gabapentin 300 MG Oral Capsule] 120 capsule 0     Sig: Take 2 capsules by mouth twice daily       Last Appointment Date: 2/13/2024  Next Appointment Date: 5/17/2024    Allergies   Allergen Reactions    Atorvastatin      Other reaction(s): Muscle Pain    Codeine     Diltiazem Hcl Hives    Levofloxacin      Other reaction(s): Intolerance-unknown    Pregabalin      lyrica    Simvastatin      Other reaction(s): Muscle Pain

## 2024-03-19 RX ORDER — BUSPIRONE HYDROCHLORIDE 10 MG/1
10 TABLET ORAL 3 TIMES DAILY PRN
Qty: 90 TABLET | Refills: 5 | Status: SHIPPED | OUTPATIENT
Start: 2024-03-19

## 2024-03-19 NOTE — TELEPHONE ENCOUNTER
Lara called requesting a refill of the below medication which has been pended for you:     Requested Prescriptions     Pending Prescriptions Disp Refills    busPIRone (BUSPAR) 10 MG tablet 90 tablet 5     Sig: Take 1 tablet by mouth 3 times daily as needed (anxiety)       Last Appointment Date: 2/13/2024  Next Appointment Date: 5/17/2024    Allergies   Allergen Reactions    Atorvastatin      Other reaction(s): Muscle Pain    Codeine     Diltiazem Hcl Hives    Levofloxacin      Other reaction(s): Intolerance-unknown    Pregabalin      lyrica    Simvastatin      Other reaction(s): Muscle Pain

## 2024-04-01 RX ORDER — ROPINIROLE 0.25 MG/1
0.25 TABLET, FILM COATED ORAL NIGHTLY
Qty: 90 TABLET | Refills: 1 | OUTPATIENT
Start: 2024-04-01

## 2024-04-08 ENCOUNTER — HOSPITAL ENCOUNTER (EMERGENCY)
Age: 89
Discharge: HOME | End: 2024-04-08
Attending: EMERGENCY MEDICINE
Payer: MEDICARE

## 2024-04-08 ENCOUNTER — APPOINTMENT (OUTPATIENT)
Dept: GENERAL RADIOLOGY | Age: 89
End: 2024-04-08
Payer: MEDICARE

## 2024-04-08 ENCOUNTER — OFFICE VISIT (OUTPATIENT)
Dept: PRIMARY CARE CLINIC | Age: 89
End: 2024-04-08
Payer: MEDICARE

## 2024-04-08 VITALS
DIASTOLIC BLOOD PRESSURE: 74 MMHG | TEMPERATURE: 98 F | SYSTOLIC BLOOD PRESSURE: 112 MMHG | HEART RATE: 64 BPM | OXYGEN SATURATION: 97 % | BODY MASS INDEX: 16.85 KG/M2 | WEIGHT: 89.2 LBS

## 2024-04-08 VITALS
DIASTOLIC BLOOD PRESSURE: 73 MMHG | OXYGEN SATURATION: 93 % | RESPIRATION RATE: 21 BRPM | WEIGHT: 89 LBS | BODY MASS INDEX: 16.82 KG/M2 | TEMPERATURE: 97.7 F | SYSTOLIC BLOOD PRESSURE: 170 MMHG | HEART RATE: 71 BPM

## 2024-04-08 DIAGNOSIS — T50.901A MEDICATION OVERDOSE, ACCIDENTAL OR UNINTENTIONAL, INITIAL ENCOUNTER: Primary | ICD-10-CM

## 2024-04-08 DIAGNOSIS — T50.901A ACCIDENTAL DRUG OVERDOSE, INITIAL ENCOUNTER: Primary | ICD-10-CM

## 2024-04-08 DIAGNOSIS — R06.02 SHORTNESS OF BREATH: ICD-10-CM

## 2024-04-08 DIAGNOSIS — M79.671 PAIN OF RIGHT HEEL: ICD-10-CM

## 2024-04-08 LAB
ALBUMIN SERPL-MCNC: 3.5 G/DL (ref 3.5–5.2)
ALBUMIN/GLOB SERPL: 0.9 {RATIO} (ref 1–2.5)
ALP SERPL-CCNC: 86 U/L (ref 35–104)
ALT SERPL-CCNC: 6 U/L (ref 5–33)
ANION GAP SERPL CALCULATED.3IONS-SCNC: 10 MMOL/L (ref 9–17)
APAP SERPL-MCNC: <5 UG/ML (ref 10–30)
AST SERPL-CCNC: 16 U/L
BACTERIA URNS QL MICRO: ABNORMAL
BASOPHILS # BLD: 0.03 K/UL (ref 0–0.2)
BASOPHILS NFR BLD: 0 % (ref 0–2)
BILIRUB SERPL-MCNC: 0.4 MG/DL (ref 0.3–1.2)
BILIRUB UR QL STRIP: NEGATIVE
BNP SERPL-MCNC: 3850 PG/ML
BUN SERPL-MCNC: 15 MG/DL (ref 8–23)
BUN/CREAT SERPL: 19 (ref 9–20)
CALCIUM SERPL-MCNC: 8.8 MG/DL (ref 8.6–10.4)
CHARACTER UR: ABNORMAL
CHLORIDE SERPL-SCNC: 99 MMOL/L (ref 98–107)
CLARITY UR: CLEAR
CO2 SERPL-SCNC: 26 MMOL/L (ref 20–31)
COLOR UR: YELLOW
CREAT SERPL-MCNC: 0.8 MG/DL (ref 0.5–0.9)
EOSINOPHIL # BLD: <0.03 K/UL (ref 0–0.44)
EOSINOPHILS RELATIVE PERCENT: 0 % (ref 1–4)
EPI CELLS #/AREA URNS HPF: ABNORMAL /HPF (ref 0–5)
ERYTHROCYTE [DISTWIDTH] IN BLOOD BY AUTOMATED COUNT: 14.9 % (ref 11.8–14.4)
ETHANOLAMINE SERPL-MCNC: <10 MG/DL
GFR SERPL CREATININE-BSD FRML MDRD: 68 ML/MIN/1.73M2
GLUCOSE SERPL-MCNC: 179 MG/DL (ref 70–99)
GLUCOSE UR STRIP-MCNC: NEGATIVE MG/DL
HCT VFR BLD AUTO: 34.6 % (ref 36.3–47.1)
HGB BLD-MCNC: 10.6 G/DL (ref 11.9–15.1)
HGB UR QL STRIP.AUTO: NEGATIVE
IMM GRANULOCYTES # BLD AUTO: 0.04 K/UL (ref 0–0.3)
IMM GRANULOCYTES NFR BLD: 0 %
KETONES UR STRIP-MCNC: NEGATIVE MG/DL
LEUKOCYTE ESTERASE UR QL STRIP: NEGATIVE
LYMPHOCYTES NFR BLD: 1.46 K/UL (ref 1.1–3.7)
LYMPHOCYTES RELATIVE PERCENT: 15 % (ref 24–43)
MCH RBC QN AUTO: 27.9 PG (ref 25.2–33.5)
MCHC RBC AUTO-ENTMCNC: 30.6 G/DL (ref 25.2–33.5)
MCV RBC AUTO: 91.1 FL (ref 82.6–102.9)
MONOCYTES NFR BLD: 0.62 K/UL (ref 0.1–1.2)
MONOCYTES NFR BLD: 6 % (ref 3–12)
NEUTROPHILS NFR BLD: 79 % (ref 36–65)
NEUTS SEG NFR BLD: 7.94 K/UL (ref 1.5–8.1)
NITRITE UR QL STRIP: NEGATIVE
NRBC BLD-RTO: 0 PER 100 WBC
PH UR STRIP: 5.5 [PH] (ref 5–6)
PLATELET # BLD AUTO: 235 K/UL (ref 138–453)
PMV BLD AUTO: 10.6 FL (ref 8.1–13.5)
POTASSIUM SERPL-SCNC: 4.2 MMOL/L (ref 3.7–5.3)
PROT SERPL-MCNC: 7.2 G/DL (ref 6.4–8.3)
PROT UR STRIP-MCNC: ABNORMAL MG/DL
RBC # BLD AUTO: 3.8 M/UL (ref 3.95–5.11)
RBC # BLD: ABNORMAL 10*6/UL
RBC #/AREA URNS HPF: ABNORMAL /HPF (ref 0–4)
SALICYLATES SERPL-MCNC: <1 MG/DL (ref 3–10)
SODIUM SERPL-SCNC: 135 MMOL/L (ref 135–144)
SP GR UR STRIP: 1.02 (ref 1.01–1.02)
TROPONIN I SERPL HS-MCNC: 58 NG/L (ref 0–14)
UROBILINOGEN UR STRIP-ACNC: NORMAL EU/DL (ref 0–1)
WBC #/AREA URNS HPF: ABNORMAL /HPF (ref 0–4)
WBC OTHER # BLD: 10.1 K/UL (ref 3.5–11.3)

## 2024-04-08 PROCEDURE — 99212 OFFICE O/P EST SF 10 MIN: CPT | Performed by: NURSE PRACTITIONER

## 2024-04-08 PROCEDURE — 1090F PRES/ABSN URINE INCON ASSESS: CPT | Performed by: NURSE PRACTITIONER

## 2024-04-08 PROCEDURE — G0480 DRUG TEST DEF 1-7 CLASSES: HCPCS

## 2024-04-08 PROCEDURE — 80307 DRUG TEST PRSMV CHEM ANLYZR: CPT

## 2024-04-08 PROCEDURE — 85025 COMPLETE CBC W/AUTO DIFF WBC: CPT

## 2024-04-08 PROCEDURE — 81001 URINALYSIS AUTO W/SCOPE: CPT

## 2024-04-08 PROCEDURE — 83880 ASSAY OF NATRIURETIC PEPTIDE: CPT

## 2024-04-08 PROCEDURE — 99214 OFFICE O/P EST MOD 30 MIN: CPT | Performed by: NURSE PRACTITIONER

## 2024-04-08 PROCEDURE — 80179 DRUG ASSAY SALICYLATE: CPT

## 2024-04-08 PROCEDURE — 99285 EMERGENCY DEPT VISIT HI MDM: CPT

## 2024-04-08 PROCEDURE — G8419 CALC BMI OUT NRM PARAM NOF/U: HCPCS | Performed by: NURSE PRACTITIONER

## 2024-04-08 PROCEDURE — 36415 COLL VENOUS BLD VENIPUNCTURE: CPT

## 2024-04-08 PROCEDURE — 71046 X-RAY EXAM CHEST 2 VIEWS: CPT

## 2024-04-08 PROCEDURE — 73630 X-RAY EXAM OF FOOT: CPT

## 2024-04-08 PROCEDURE — 84484 ASSAY OF TROPONIN QUANT: CPT

## 2024-04-08 PROCEDURE — 1123F ACP DISCUSS/DSCN MKR DOCD: CPT | Performed by: NURSE PRACTITIONER

## 2024-04-08 PROCEDURE — 80053 COMPREHEN METABOLIC PANEL: CPT

## 2024-04-08 PROCEDURE — 1036F TOBACCO NON-USER: CPT | Performed by: NURSE PRACTITIONER

## 2024-04-08 PROCEDURE — 93005 ELECTROCARDIOGRAM TRACING: CPT | Performed by: EMERGENCY MEDICINE

## 2024-04-08 PROCEDURE — G8427 DOCREV CUR MEDS BY ELIG CLIN: HCPCS | Performed by: NURSE PRACTITIONER

## 2024-04-08 PROCEDURE — 80143 DRUG ASSAY ACETAMINOPHEN: CPT

## 2024-04-08 RX ORDER — OXYCODONE HYDROCHLORIDE AND ACETAMINOPHEN 5; 325 MG/1; MG/1
1 TABLET ORAL EVERY 4 HOURS PRN
COMMUNITY

## 2024-04-08 ASSESSMENT — PAIN DESCRIPTION - LOCATION: LOCATION: LEG

## 2024-04-08 ASSESSMENT — PATIENT HEALTH QUESTIONNAIRE - PHQ9
2. FEELING DOWN, DEPRESSED OR HOPELESS: NOT AT ALL
SUM OF ALL RESPONSES TO PHQ9 QUESTIONS 1 & 2: 0
SUM OF ALL RESPONSES TO PHQ QUESTIONS 1-9: 0
1. LITTLE INTEREST OR PLEASURE IN DOING THINGS: NOT AT ALL
SUM OF ALL RESPONSES TO PHQ QUESTIONS 1-9: 0

## 2024-04-08 ASSESSMENT — PAIN DESCRIPTION - FREQUENCY: FREQUENCY: CONTINUOUS

## 2024-04-08 ASSESSMENT — PAIN - FUNCTIONAL ASSESSMENT
PAIN_FUNCTIONAL_ASSESSMENT: ACTIVITIES ARE NOT PREVENTED
PAIN_FUNCTIONAL_ASSESSMENT: 0-10
PAIN_FUNCTIONAL_ASSESSMENT: NONE - DENIES PAIN

## 2024-04-08 ASSESSMENT — PAIN SCALES - GENERAL: PAINLEVEL_OUTOF10: 7

## 2024-04-08 ASSESSMENT — PAIN DESCRIPTION - DESCRIPTORS: DESCRIPTORS: DISCOMFORT

## 2024-04-08 ASSESSMENT — PAIN DESCRIPTION - ONSET: ONSET: ON-GOING

## 2024-04-08 ASSESSMENT — PAIN DESCRIPTION - ORIENTATION: ORIENTATION: RIGHT

## 2024-04-08 ASSESSMENT — PAIN DESCRIPTION - PAIN TYPE: TYPE: CHRONIC PAIN

## 2024-04-08 NOTE — PROGRESS NOTES
(BUSPAR) 10 MG tablet Take 1 tablet by mouth 3 times daily as needed (anxiety) 90 tablet 5    lisinopril (PRINIVIL;ZESTRIL) 2.5 MG tablet Take 1 tablet by mouth once daily 90 tablet 0    amLODIPine (NORVASC) 5 MG tablet Take 1 tablet by mouth twice daily 180 tablet 0    isosorbide mononitrate (IMDUR) 30 MG extended release tablet Take 1 tablet by mouth once daily 90 tablet 0    gabapentin (NEURONTIN) 300 MG capsule Take 1 capsule by mouth 2 times daily for 90 days. 180 capsule 0    OXYGEN Needs portable O2 at 2LPM 1 each 5    rOPINIRole (REQUIP) 0.25 MG tablet Take 1 tablet by mouth nightly 90 tablet 1    furosemide (LASIX) 20 MG tablet Take 0.5 tablets by mouth daily 30 tablet 3    hydrALAZINE (APRESOLINE) 25 MG tablet Take 1/2 (one-half) tablet by mouth twice daily 90 tablet 0    carvedilol (COREG) 12.5 MG tablet TAKE 1 TABLET BY MOUTH TWICE DAILY WITH MEALS 180 tablet 0    ferrous sulfate (SV IRON) 325 (65 Fe) MG tablet TAKE 1 TABLET BY MOUTH THREE TIMES DAILY 270 tablet 1    TOBRADEX ST 0.3-0.05 % SUSP INSTILL 1 DROP INTO RIGHT EYE THREE TIMES DAILY      Handicap Placard MISC by Does not apply route Exp 3/1/2026 1 each 0    Glycerin, Laxative, (GLYCERIN ADULT) 2.1 g suppository Place 1 suppository rectally as needed (constipation) 5 suppository 0    polyethylene glycol (GLYCOLAX) 17 g packet Take 1 packet by mouth daily       No facility-administered encounter medications on file as of 4/8/2024.            Gomez Maya, APRN - CNP

## 2024-04-09 ENCOUNTER — CARE COORDINATION (OUTPATIENT)
Dept: CARE COORDINATION | Age: 89
End: 2024-04-09

## 2024-04-09 NOTE — CARE COORDINATION
Ambulatory Care Coordination  ED Follow up Call    [facility] ED 4/8/2024    Reason for ED visit:  Accidental overdose, water pill increased for 3 days d/t SOB      Spoke with grand daughter. Lara lives with her daughter and grand daughter lives next door. They assist with care.   She stated Lara is doing well. She has doubled up water pill for 3 days as recommended by ER Physician. They are schedule with cardiologist Dr Silva at [facility] 4/23/2024.   She denied any concerns. They will reach out if needed. This writer's contact information given.     Status:     improved    Did you call your PCP prior to going to the ED?  Yes      Did you receive a discharge instructions from the Emergency Room? Yes  Review of Instructions:     Understands what to report/when to return?:  Yes   Understands discharge instructions?:  Yes   Following discharge instructions?:  Yes       Are there any new complaints of pain? No  New Pain Meds? N/A    Constipation prophylaxis needed?  N/A    If you have a wound is the dressing clean, dry, and intact? N/A  Understands wound care regimen? N/A    Are there any other complaints/concerns that you wish to tell your provider?   No    FU appts/Provider:    Future Appointments   Date Time Provider Department Center   5/17/2024  9:20 AM Rachael Evangel[facility]       New Medications?:   No      Medication Reconciliation by phone - Yes  Understands Medications?  Yes  Taking Medications? Yes  Can you swallow your pills?  Yes    Any further needs in the home i.e. Equipment?  No    Link to services in community?:  N/A

## 2024-04-10 LAB
EKG ATRIAL RATE: 63 BPM
EKG P AXIS: 69 DEGREES
EKG P-R INTERVAL: 152 MS
EKG Q-T INTERVAL: 460 MS
EKG QRS DURATION: 110 MS
EKG QTC CALCULATION (BAZETT): 470 MS
EKG R AXIS: 130 DEGREES
EKG T AXIS: 12 DEGREES
EKG VENTRICULAR RATE: 63 BPM

## 2024-04-26 DIAGNOSIS — G89.29 CHRONIC BILATERAL LOW BACK PAIN WITHOUT SCIATICA: ICD-10-CM

## 2024-04-26 DIAGNOSIS — M54.50 CHRONIC BILATERAL LOW BACK PAIN WITHOUT SCIATICA: ICD-10-CM

## 2024-04-26 NOTE — TELEPHONE ENCOUNTER
Lara called requesting a refill of the below medication which has been pended for you:   Per [facility] last fill date was 02/13/2024 180 for 90 days  Requested Prescriptions     Pending Prescriptions Disp Refills    gabapentin (NEURONTIN) 300 MG capsule 180 capsule 0     Sig: Take 1 capsule by mouth 2 times daily for 90 days.       Last Appointment Date: 2/13/2024  Next Appointment Date: 5/17/2024    Allergies   Allergen Reactions    Atorvastatin      Other reaction(s): Muscle Pain    Codeine     Diltiazem Hcl Hives    Levofloxacin      Other reaction(s): Intolerance-unknown    Pregabalin      lyrica    Simvastatin      Other reaction(s): Muscle Pain

## 2024-04-29 RX ORDER — LISINOPRIL 2.5 MG/1
2.5 TABLET ORAL DAILY
Qty: 90 TABLET | Refills: 0 | Status: SHIPPED | OUTPATIENT
Start: 2024-04-29

## 2024-04-29 RX ORDER — CARVEDILOL 12.5 MG/1
12.5 TABLET ORAL 2 TIMES DAILY WITH MEALS
Qty: 180 TABLET | Refills: 0 | Status: SHIPPED | OUTPATIENT
Start: 2024-04-29

## 2024-04-29 RX ORDER — ROPINIROLE 0.25 MG/1
TABLET, FILM COATED ORAL
Qty: 90 TABLET | Refills: 1 | Status: SHIPPED | OUTPATIENT
Start: 2024-04-29

## 2024-04-29 NOTE — TELEPHONE ENCOUNTER
Lara called requesting a refill of the below medication which has been pended for you:     Requested Prescriptions     Pending Prescriptions Disp Refills    rOPINIRole (REQUIP) 0.25 MG tablet [Pharmacy Med Name: rOPINIRole HCl 0.25 MG Oral Tablet] 90 tablet 0     Sig: Take 1 tablet by mouth nightly       Last Appointment Date: 2/13/2024  Next Appointment Date: 5/17/2024    Allergies   Allergen Reactions    Atorvastatin      Other reaction(s): Muscle Pain    Codeine     Diltiazem Hcl Hives    Levofloxacin      Other reaction(s): Intolerance-unknown    Pregabalin      lyrica    Simvastatin      Other reaction(s): Muscle Pain

## 2024-04-29 NOTE — TELEPHONE ENCOUNTER
Lara called requesting a refill of the below medication which has been pended for you:     Requested Prescriptions     Pending Prescriptions Disp Refills    lisinopril (PRINIVIL;ZESTRIL) 2.5 MG tablet 90 tablet 0     Sig: Take 1 tablet by mouth daily    carvedilol (COREG) 12.5 MG tablet 180 tablet 0     Sig: Take 1 tablet by mouth 2 times daily (with meals)       Last Appointment Date: 2/13/2024  Next Appointment Date: 5/17/2024    Allergies   Allergen Reactions    Atorvastatin      Other reaction(s): Muscle Pain    Codeine     Diltiazem Hcl Hives    Levofloxacin      Other reaction(s): Intolerance-unknown    Pregabalin      lyrica    Simvastatin      Other reaction(s): Muscle Pain

## 2024-05-01 DIAGNOSIS — G89.29 CHRONIC BILATERAL LOW BACK PAIN WITHOUT SCIATICA: ICD-10-CM

## 2024-05-01 DIAGNOSIS — M54.50 CHRONIC BILATERAL LOW BACK PAIN WITHOUT SCIATICA: ICD-10-CM

## 2024-05-01 RX ORDER — OXYCODONE HYDROCHLORIDE AND ACETAMINOPHEN 5; 325 MG/1; MG/1
1 TABLET ORAL EVERY 4 HOURS PRN
OUTPATIENT
Start: 2024-05-01

## 2024-05-01 RX ORDER — OXYCODONE AND ACETAMINOPHEN 7.5; 325 MG/1; MG/1
1 TABLET ORAL EVERY 6 HOURS PRN
Qty: 120 TABLET | Refills: 0 | Status: SHIPPED | OUTPATIENT
Start: 2024-05-01 | End: 2024-05-31

## 2024-05-01 NOTE — TELEPHONE ENCOUNTER
LK pt, LK out of office. TWV on notes.    Per [facility], last fill 04/01/24, quantity 120 for 30 days.   Lara called requesting a refill of the below medication which has been pended for you:     Requested Prescriptions     Pending Prescriptions Disp Refills    oxyCODONE-acetaminophen (PERCOCET) 7.5-325 MG per tablet 120 tablet 0     Sig: Take 1 tablet by mouth every 6 hours as needed for Pain for up to 30 days. Max Daily Amount: 4 tablets     Refused Prescriptions Disp Refills    oxyCODONE-acetaminophen (PERCOCET) 5-325 MG per tablet       Sig: Take 1 tablet by mouth every 4 hours as needed for Pain. Uses every 8 hours (4 a day) Max Daily Amount: 6 tablets       Last Appointment Date: 2/13/2024  Next Appointment Date: 5/17/2024    Allergies   Allergen Reactions    Atorvastatin      Other reaction(s): Muscle Pain    Codeine     Diltiazem Hcl Hives    Levofloxacin      Other reaction(s): Intolerance-unknown    Pregabalin      lyrica    Simvastatin      Other reaction(s): Muscle Pain

## 2024-05-07 RX ORDER — GABAPENTIN 300 MG/1
300 CAPSULE ORAL 2 TIMES DAILY
Qty: 180 CAPSULE | Refills: 0 | Status: SHIPPED | OUTPATIENT
Start: 2024-05-11 | End: 2024-08-09

## 2024-05-17 ENCOUNTER — OFFICE VISIT (OUTPATIENT)
Dept: FAMILY MEDICINE CLINIC | Age: 89
End: 2024-05-17
Payer: MEDICARE

## 2024-05-17 VITALS
DIASTOLIC BLOOD PRESSURE: 84 MMHG | HEIGHT: 61 IN | WEIGHT: 85.6 LBS | OXYGEN SATURATION: 97 % | SYSTOLIC BLOOD PRESSURE: 138 MMHG | BODY MASS INDEX: 16.16 KG/M2 | HEART RATE: 55 BPM

## 2024-05-17 DIAGNOSIS — F41.9 ANXIETY: Primary | ICD-10-CM

## 2024-05-17 DIAGNOSIS — I10 ESSENTIAL HYPERTENSION: ICD-10-CM

## 2024-05-17 DIAGNOSIS — G89.29 CHRONIC BILATERAL LOW BACK PAIN WITHOUT SCIATICA: ICD-10-CM

## 2024-05-17 DIAGNOSIS — M54.50 CHRONIC BILATERAL LOW BACK PAIN WITHOUT SCIATICA: ICD-10-CM

## 2024-05-17 DIAGNOSIS — H61.23 CERUMEN DEBRIS ON TYMPANIC MEMBRANE OF BOTH EARS: ICD-10-CM

## 2024-05-17 PROCEDURE — 69209 REMOVE IMPACTED EAR WAX UNI: CPT | Performed by: FAMILY MEDICINE

## 2024-05-17 RX ORDER — FLUOXETINE 10 MG/1
10 CAPSULE ORAL DAILY
Qty: 30 CAPSULE | Refills: 3 | Status: SHIPPED | OUTPATIENT
Start: 2024-05-17

## 2024-05-17 RX ORDER — OXYCODONE AND ACETAMINOPHEN 7.5; 325 MG/1; MG/1
1 TABLET ORAL EVERY 6 HOURS PRN
Qty: 120 TABLET | Refills: 0 | Status: SHIPPED | OUTPATIENT
Start: 2024-05-17 | End: 2024-05-17 | Stop reason: SDUPTHER

## 2024-05-17 RX ORDER — OXYCODONE AND ACETAMINOPHEN 7.5; 325 MG/1; MG/1
1 TABLET ORAL EVERY 6 HOURS PRN
Qty: 120 TABLET | Refills: 0 | Status: SHIPPED | OUTPATIENT
Start: 2024-05-17 | End: 2024-06-16

## 2024-05-17 SDOH — ECONOMIC STABILITY: INCOME INSECURITY: HOW HARD IS IT FOR YOU TO PAY FOR THE VERY BASICS LIKE FOOD, HOUSING, MEDICAL CARE, AND HEATING?: NOT HARD AT ALL

## 2024-05-17 SDOH — ECONOMIC STABILITY: HOUSING INSECURITY
IN THE LAST 12 MONTHS, WAS THERE A TIME WHEN YOU DID NOT HAVE A STEADY PLACE TO SLEEP OR SLEPT IN A SHELTER (INCLUDING NOW)?: NO

## 2024-05-17 SDOH — ECONOMIC STABILITY: FOOD INSECURITY: WITHIN THE PAST 12 MONTHS, THE FOOD YOU BOUGHT JUST DIDN'T LAST AND YOU DIDN'T HAVE MONEY TO GET MORE.: NEVER TRUE

## 2024-05-17 SDOH — ECONOMIC STABILITY: FOOD INSECURITY: WITHIN THE PAST 12 MONTHS, YOU WORRIED THAT YOUR FOOD WOULD RUN OUT BEFORE YOU GOT MONEY TO BUY MORE.: NEVER TRUE

## 2024-05-17 ASSESSMENT — ENCOUNTER SYMPTOMS
COUGH: 0
SHORTNESS OF BREATH: 0
WHEEZING: 0
BACK PAIN: 1
CHEST TIGHTNESS: 0

## 2024-05-17 NOTE — PROGRESS NOTES
CERUMEN IRRIGATION/LVG UNILAT                Plan:    Assessment & Plan     Anxiety: worsening; she is feeling more anxious and worrying about when she gets her meds so I started her on prozac to try to help with her overall anxiety. I also suggested giving her an extra 1/2 of a buspar if needed during a panic attack.     HTN: stable; her blood pressure is great so I will continue the lisinopril, coreg, and norvasc.    Back pain: stable; she is still getting good relief from percocet. She accidentally overdosed on it a few months ago so now her family is getting her the meds when needed. She is not happy about this and she tends to watch the clock looking for when it is time for her next dose. I sent in a 3 month's supply for her.     Controlled Substance Monitoring:    Acute and Chronic Pain Monitoring:   RX Monitoring Periodic Controlled Substance Monitoring   5/17/2024   1:35 PM Possible medication side effects, risk of tolerance/dependence & alternative treatments discussed.;No signs of potential drug abuse or diversion identified.;Assessed functional status (ability to engage in work or other purposeful activities, the pain intensity and its interference with activities of daily living, quality of family life and social activities, and the physical activity);Obtaining appropriate analgesic effect of treatment.     Cerumen impaction:   Procedure:  Pre procedure diagnosis: bilateral cerumen impaction  Post procedure diagnosis: resolved  Description: Ceruminosis is noted.  Wax is removed by syringing and manual debridement with a curette. Instructions for home care to prevent wax buildup are given.      Return in about 3 months (around 8/17/2024) for Anxiety follow up, Back pain follow up.      Orders Placed This Encounter   Medications    FLUoxetine (PROZAC) 10 MG capsule     Sig: Take 1 capsule by mouth daily     Dispense:  30 capsule     Refill:  3    DISCONTD: oxyCODONE-acetaminophen (PERCOCET) 7.5-325 MG per

## 2024-05-24 RX ORDER — AMLODIPINE BESYLATE 5 MG/1
5 TABLET ORAL 2 TIMES DAILY
Qty: 180 TABLET | Refills: 0 | Status: SHIPPED | OUTPATIENT
Start: 2024-05-24

## 2024-05-24 NOTE — TELEPHONE ENCOUNTER
Lara called requesting a refill of the below medication which has been pended for you:     Requested Prescriptions     Pending Prescriptions Disp Refills    amLODIPine (NORVASC) 5 MG tablet 180 tablet 0     Sig: Take 1 tablet by mouth 2 times daily       Last Appointment Date: 5/17/2024  Next Appointment Date: 8/16/2024    Allergies   Allergen Reactions    Atorvastatin      Other reaction(s): Muscle Pain    Codeine     Diltiazem Hcl Hives    Levofloxacin      Other reaction(s): Intolerance-unknown    Pregabalin      lyrica    Simvastatin      Other reaction(s): Muscle Pain

## 2024-06-24 DIAGNOSIS — D64.9 ANEMIA, UNSPECIFIED TYPE: ICD-10-CM

## 2024-06-24 RX ORDER — ISOSORBIDE MONONITRATE 30 MG/1
30 TABLET, EXTENDED RELEASE ORAL DAILY
Qty: 90 TABLET | Refills: 0 | Status: SHIPPED | OUTPATIENT
Start: 2024-06-24

## 2024-06-24 RX ORDER — ISOSORBIDE MONONITRATE 30 MG/1
TABLET, EXTENDED RELEASE ORAL
Qty: 90 TABLET | Refills: 0 | OUTPATIENT
Start: 2024-06-24

## 2024-06-24 RX ORDER — FERROUS SULFATE 325(65) MG
TABLET ORAL
Qty: 270 TABLET | Refills: 1 | Status: SHIPPED | OUTPATIENT
Start: 2024-06-24

## 2024-06-24 NOTE — TELEPHONE ENCOUNTER
Lara called requesting a refill of the below medication which has been pended for you:     Requested Prescriptions     Pending Prescriptions Disp Refills    ferrous sulfate (SV IRON) 325 (65 Fe) MG tablet 270 tablet 1     Sig: TAKE 1 TABLET BY MOUTH THREE TIMES DAILY    isosorbide mononitrate (IMDUR) 30 MG extended release tablet 90 tablet 0     Sig: Take 1 tablet by mouth daily       Last Appointment Date: 5/17/2024  Next Appointment Date: 8/16/2024    Allergies   Allergen Reactions    Atorvastatin      Other reaction(s): Muscle Pain    Codeine     Diltiazem Hcl Hives    Levofloxacin      Other reaction(s): Intolerance-unknown    Pregabalin      lyrica    Simvastatin      Other reaction(s): Muscle Pain

## 2024-06-26 NOTE — TELEPHONE ENCOUNTER
Lara called requesting a refill of the below medication which has been pended for you:     Requested Prescriptions     Pending Prescriptions Disp Refills    carvedilol (COREG) 12.5 MG tablet 180 tablet 0     Sig: Take 1 tablet by mouth 2 times daily (with meals)    amLODIPine (NORVASC) 5 MG tablet 180 tablet 0     Sig: Take 1 tablet by mouth 2 times daily       Last Appointment Date: 5/17/2024  Next Appointment Date: 8/16/2024    Allergies   Allergen Reactions    Atorvastatin      Other reaction(s): Muscle Pain    Codeine     Diltiazem Hcl Hives    Levofloxacin      Other reaction(s): Intolerance-unknown    Pregabalin      lyrica    Simvastatin      Other reaction(s): Muscle Pain

## 2024-06-27 RX ORDER — CARVEDILOL 12.5 MG/1
12.5 TABLET ORAL 2 TIMES DAILY WITH MEALS
Qty: 180 TABLET | Refills: 0 | Status: SHIPPED | OUTPATIENT
Start: 2024-06-27

## 2024-06-27 RX ORDER — AMLODIPINE BESYLATE 5 MG/1
5 TABLET ORAL 2 TIMES DAILY
Qty: 180 TABLET | Refills: 0 | Status: SHIPPED | OUTPATIENT
Start: 2024-06-27

## 2024-07-15 NOTE — TELEPHONE ENCOUNTER
Subjective:       Patient ID: Felipe Nava is a 32 y.o. male.    Chief Complaint: Follow-up (Life insurance paper work )      The patient location is: [place]  The chief complaint leading to consultation is: paperwork review  Visit type: Virtual visit with synchronous audio and video  Total time spent with patient: 10mins  Each patient to whom he or she provides medical services by telemedicine is:  (1) informed of the relationship between the physician and patient and the respective role of any other health care provider with respect to management of the patient; and (2) notified that he or she may decline to receive medical services by telemedicine and may withdraw from such care at any time.    Notes:   Patient seen for review and paperwork. Doing well, no updates to health hx.      Review of Systems   Constitutional:  Negative for activity change and unexpected weight change.   HENT:  Negative for hearing loss, rhinorrhea and trouble swallowing.    Eyes:  Negative for discharge and visual disturbance.   Respiratory:  Negative for chest tightness and wheezing.    Cardiovascular:  Negative for chest pain and palpitations.   Gastrointestinal:  Negative for blood in stool, constipation, diarrhea and vomiting.   Endocrine: Negative for polydipsia and polyuria.   Genitourinary:  Negative for difficulty urinating, hematuria and urgency.   Musculoskeletal:  Negative for arthralgias, joint swelling and neck pain.   Neurological:  Negative for weakness and headaches.   Psychiatric/Behavioral:  Negative for confusion and dysphoric mood.        Objective:        Physical Exam  Vitals and nursing note reviewed.   Constitutional:       General: He is not in acute distress.     Appearance: Normal appearance. He is well-developed.   HENT:      Head: Normocephalic and atraumatic.   Eyes:      General: No scleral icterus.        Right eye: No discharge.         Left eye: No discharge.   Pulmonary:      Effort: No respiratory  Called [facility] back. Spoke with Lola Cornea pharmacist. Stated that patient had come and picked up script for Percocet 10/325mg yesterday and patient was concerned since it was a higher dose of medication than she had been taking (percocet 7.5/325). Lola Simeon stated that he told the patient that he could half the medication to get her through until the the next fill date. Cancelled the remaining 2 scripts (8/11/18, 9/10/18) with Lola Simeon and stated that we would send in new scripts for Percocet 7.5/325mg. distress.   Musculoskeletal:         General: No deformity or signs of injury.   Neurological:      General: No focal deficit present.      Mental Status: He is alert and oriented to person, place, and time.   Psychiatric:         Mood and Affect: Mood normal.         Behavior: Behavior normal.             Assessment:   PTSD (post-traumatic stress disorder)    Anxiety with depression       Paperwork/letter reviewed with patient and signed.     Patient aware of limitations to assessment and exam of telemedicine. Deemed appropriate at this time given current covid-19 concerns.

## 2024-08-05 DIAGNOSIS — G89.29 CHRONIC BILATERAL LOW BACK PAIN WITHOUT SCIATICA: ICD-10-CM

## 2024-08-05 DIAGNOSIS — M54.50 CHRONIC BILATERAL LOW BACK PAIN WITHOUT SCIATICA: ICD-10-CM

## 2024-08-05 RX ORDER — GABAPENTIN 300 MG/1
CAPSULE ORAL
Qty: 180 CAPSULE | Refills: 0 | Status: SHIPPED | OUTPATIENT
Start: 2024-08-05 | End: 2024-11-03

## 2024-08-05 RX ORDER — FUROSEMIDE 20 MG/1
TABLET ORAL
Qty: 30 TABLET | Refills: 3 | Status: SHIPPED | OUTPATIENT
Start: 2024-08-05

## 2024-08-05 NOTE — TELEPHONE ENCOUNTER
Lara called requesting a refill of the below medication which has been pended for you:   Per [facility] last fill date was 5/13/2024 quantity 180 for 90 days  Requested Prescriptions     Pending Prescriptions Disp Refills    gabapentin (NEURONTIN) 300 MG capsule [Pharmacy Med Name: Gabapentin 300 MG Oral Capsule] 180 capsule 0     Sig: TAKE 1 CAPSULE BY MOUTH TWICE DAILY FOR 90 DAYS DNF  UNTIL  5/11/24.       Last Appointment Date: 5/17/2024  Next Appointment Date: 8/16/2024    Allergies   Allergen Reactions    Atorvastatin      Other reaction(s): Muscle Pain    Codeine     Diltiazem Hcl Hives    Levofloxacin      Other reaction(s): Intolerance-unknown    Pregabalin      lyrica    Simvastatin      Other reaction(s): Muscle Pain

## 2024-08-06 RX ORDER — LISINOPRIL 2.5 MG/1
2.5 TABLET ORAL DAILY
Qty: 90 TABLET | Refills: 0 | Status: SHIPPED | OUTPATIENT
Start: 2024-08-06

## 2024-08-06 NOTE — TELEPHONE ENCOUNTER
Lara called requesting a refill of the below medication which has been pended for you:     Requested Prescriptions     Pending Prescriptions Disp Refills    lisinopril (PRINIVIL;ZESTRIL) 2.5 MG tablet [Pharmacy Med Name: Lisinopril 2.5 MG Oral Tablet] 90 tablet 0     Sig: Take 1 tablet by mouth once daily       Last Appointment Date: 5/17/2024  Next Appointment Date: 8/16/2024    Allergies   Allergen Reactions    Atorvastatin      Other reaction(s): Muscle Pain    Codeine     Diltiazem Hcl Hives    Levofloxacin      Other reaction(s): Intolerance-unknown    Pregabalin      lyrica    Simvastatin      Other reaction(s): Muscle Pain

## 2024-08-16 ENCOUNTER — TELEMEDICINE (OUTPATIENT)
Dept: FAMILY MEDICINE CLINIC | Age: 89
End: 2024-08-16

## 2024-08-16 DIAGNOSIS — F41.9 ANXIETY: ICD-10-CM

## 2024-08-16 DIAGNOSIS — G89.29 CHRONIC BILATERAL LOW BACK PAIN WITHOUT SCIATICA: Primary | ICD-10-CM

## 2024-08-16 DIAGNOSIS — M54.50 CHRONIC BILATERAL LOW BACK PAIN WITHOUT SCIATICA: Primary | ICD-10-CM

## 2024-08-16 DIAGNOSIS — I50.22 CHRONIC SYSTOLIC (CONGESTIVE) HEART FAILURE (HCC): ICD-10-CM

## 2024-08-16 RX ORDER — AMLODIPINE BESYLATE 5 MG/1
5 TABLET ORAL 2 TIMES DAILY
Qty: 180 TABLET | Refills: 0 | Status: SHIPPED | OUTPATIENT
Start: 2024-08-16

## 2024-08-16 RX ORDER — FLUOXETINE 10 MG/1
10 CAPSULE ORAL DAILY
Qty: 90 CAPSULE | Refills: 1 | Status: SHIPPED | OUTPATIENT
Start: 2024-08-16

## 2024-08-16 RX ORDER — OXYCODONE AND ACETAMINOPHEN 7.5; 325 MG/1; MG/1
TABLET ORAL
COMMUNITY
Start: 2024-08-05 | End: 2024-08-16 | Stop reason: SDUPTHER

## 2024-08-16 RX ORDER — ROPINIROLE 0.25 MG/1
TABLET, FILM COATED ORAL
Qty: 90 TABLET | Refills: 1 | Status: SHIPPED | OUTPATIENT
Start: 2024-08-16

## 2024-08-16 RX ORDER — ISOSORBIDE MONONITRATE 30 MG/1
30 TABLET, EXTENDED RELEASE ORAL DAILY
Qty: 90 TABLET | Refills: 1 | Status: SHIPPED | OUTPATIENT
Start: 2024-08-16

## 2024-08-16 RX ORDER — BUSPIRONE HYDROCHLORIDE 10 MG/1
10 TABLET ORAL 3 TIMES DAILY PRN
Qty: 270 TABLET | Refills: 1 | Status: SHIPPED | OUTPATIENT
Start: 2024-08-16

## 2024-08-16 RX ORDER — CARVEDILOL 12.5 MG/1
12.5 TABLET ORAL 2 TIMES DAILY WITH MEALS
Qty: 180 TABLET | Refills: 1 | Status: SHIPPED | OUTPATIENT
Start: 2024-08-16

## 2024-08-16 RX ORDER — OXYCODONE AND ACETAMINOPHEN 7.5; 325 MG/1; MG/1
1 TABLET ORAL EVERY 6 HOURS PRN
Qty: 120 TABLET | Refills: 0 | Status: SHIPPED | OUTPATIENT
Start: 2024-08-16 | End: 2024-09-15

## 2024-08-16 ASSESSMENT — ENCOUNTER SYMPTOMS
CHEST TIGHTNESS: 0
ABDOMINAL PAIN: 0
BACK PAIN: 1
COUGH: 0
WHEEZING: 0
NAUSEA: 0
DIARRHEA: 0
CONSTIPATION: 0

## 2024-08-16 NOTE — TELEPHONE ENCOUNTER
Lara called requesting a refill of the below medication which has been pended for you:     Requested Prescriptions     Pending Prescriptions Disp Refills    amLODIPine (NORVASC) 5 MG tablet [Pharmacy Med Name: amLODIPine Besylate 5 MG Oral Tablet] 180 tablet 0     Sig: Take 1 tablet by mouth twice daily       Last Appointment Date: 5/17/2024  Next Appointment Date: 8/16/2024    Allergies   Allergen Reactions    Atorvastatin      Other reaction(s): Muscle Pain    Codeine     Diltiazem Hcl Hives    Levofloxacin      Other reaction(s): Intolerance-unknown    Pregabalin      lyrica    Simvastatin      Other reaction(s): Muscle Pain

## 2024-08-16 NOTE — PROGRESS NOTES
2024    TELEHEALTH EVALUATION -- Audio/Visual    HPI: Seen today via video visit while she was at home and I was at work    Lara Reid (:  1928) has requested an audio/video evaluation for the following concern(s):    Back pain: she is still having back pain. She has a lot of arthritis pain in her back. She feels like the pain medication is still helping the pain in her back. She is not using any OTC creams. She has tried voltaren gel with mild improvement.     She has not had any issues with chest pain or shortness of breath. She rarely gets headaches. She has not noticed any swelling in her legs. She has not gained weight. She is using her buspar 3 times a day.     Today [birth year] her birthday.     Review of Systems   Constitutional:  Negative for activity change, appetite change, chills, fatigue and fever.   Respiratory:  Negative for cough, chest tightness, shortness of breath and wheezing.    Cardiovascular:  Negative for chest pain, palpitations and leg swelling.   Gastrointestinal:  Negative for abdominal pain, constipation, diarrhea and nausea.   Genitourinary:  Negative for difficulty urinating.   Musculoskeletal:  Positive for arthralgias, back pain and gait problem.   Neurological:  Negative for dizziness, syncope, weakness, light-headedness and headaches.   Psychiatric/Behavioral:  Negative for dysphoric mood and sleep disturbance. The patient is nervous/anxious.        Prior to Visit Medications    Medication Sig Taking? Authorizing Provider   amLODIPine (NORVASC) 5 MG tablet Take 1 tablet by mouth twice daily Yes Rachael Evangelista MD   busPIRone (BUSPAR) 10 MG tablet Take 1 tablet by mouth 3 times daily as needed (anxiety) Yes Rachael Evangelista MD   carvedilol (COREG) 12.5 MG tablet Take 1 tablet by mouth 2 times daily (with meals) Yes Rachael Evangelista MD   FLUoxetine (PROZAC) 10 MG capsule Take 1 capsule by mouth daily Yes Rachael Evangelista MD   isosorbide mononitrate (IMDUR) 30 MG extended

## 2024-10-21 DIAGNOSIS — G89.29 CHRONIC BILATERAL LOW BACK PAIN WITHOUT SCIATICA: ICD-10-CM

## 2024-10-21 DIAGNOSIS — M54.50 CHRONIC BILATERAL LOW BACK PAIN WITHOUT SCIATICA: ICD-10-CM

## 2024-10-21 RX ORDER — OXYCODONE AND ACETAMINOPHEN 7.5; 325 MG/1; MG/1
1 TABLET ORAL EVERY 6 HOURS PRN
Qty: 120 TABLET | Refills: 0 | Status: SHIPPED | OUTPATIENT
Start: 2024-10-21 | End: 2024-11-20

## 2024-10-21 NOTE — TELEPHONE ENCOUNTER
Lara called requesting a refill of the below medication which has been pended for you:   Per [facility] last fill date was 09/10/2024 quantity 120 for 30 days  Requested Prescriptions     Pending Prescriptions Disp Refills    oxyCODONE-acetaminophen (PERCOCET) 7.5-325 MG per tablet 120 tablet 0     Sig: Take 1 tablet by mouth every 6 hours as needed for Pain for up to 30 days. Max Daily Amount: 4 tablets       Last Appointment Date: 8/16/2024  Next Appointment Date: Visit date not found    Allergies   Allergen Reactions    Atorvastatin      Other reaction(s): Muscle Pain    Codeine     Diltiazem Hcl Hives    Levofloxacin      Other reaction(s): Intolerance-unknown    Pregabalin      lyrica    Simvastatin      Other reaction(s): Muscle Pain

## 2024-10-28 NOTE — TELEPHONE ENCOUNTER
Lara called requesting a refill of the below medication which has been pended for you:   Nick scheduling ticket sent  Requested Prescriptions     Pending Prescriptions Disp Refills    lisinopril (PRINIVIL;ZESTRIL) 2.5 MG tablet [Pharmacy Med Name: Lisinopril 2.5 MG Oral Tablet] 90 tablet 0     Sig: Take 1 tablet by mouth once daily       Last Appointment Date: 8/16/2024  Next Appointment Date: Visit date not found    Allergies   Allergen Reactions    Atorvastatin      Other reaction(s): Muscle Pain    Codeine     Diltiazem Hcl Hives    Levofloxacin      Other reaction(s): Intolerance-unknown    Pregabalin      lyrica    Simvastatin      Other reaction(s): Muscle Pain

## 2024-10-29 RX ORDER — LISINOPRIL 2.5 MG/1
2.5 TABLET ORAL DAILY
Qty: 90 TABLET | Refills: 1 | Status: SHIPPED | OUTPATIENT
Start: 2024-10-29

## 2024-10-29 NOTE — TELEPHONE ENCOUNTER
Lara called requesting a refill of the below medication which has been pended for you:     Requested Prescriptions     Pending Prescriptions Disp Refills    lisinopril (PRINIVIL;ZESTRIL) 2.5 MG tablet [Pharmacy Med Name: Lisinopril 2.5 MG Oral Tablet] 90 tablet 1     Sig: Take 1 tablet by mouth once daily       Last Appointment Date: 8/16/2024  Next Appointment Date: 12/19/2024    Allergies   Allergen Reactions    Atorvastatin      Other reaction(s): Muscle Pain    Codeine     Diltiazem Hcl Hives    Levofloxacin      Other reaction(s): Intolerance-unknown    Pregabalin      lyrica    Simvastatin      Other reaction(s): Muscle Pain

## 2024-11-06 DIAGNOSIS — G89.29 CHRONIC BILATERAL LOW BACK PAIN WITHOUT SCIATICA: ICD-10-CM

## 2024-11-06 DIAGNOSIS — M54.50 CHRONIC BILATERAL LOW BACK PAIN WITHOUT SCIATICA: ICD-10-CM

## 2024-11-06 RX ORDER — OXYCODONE AND ACETAMINOPHEN 7.5; 325 MG/1; MG/1
1 TABLET ORAL EVERY 6 HOURS PRN
Qty: 120 TABLET | Refills: 0 | Status: SHIPPED | OUTPATIENT
Start: 2024-11-06 | End: 2024-12-06

## 2024-11-06 NOTE — TELEPHONE ENCOUNTER
Lara called requesting a refill of the below medication which has been pended for you: per [facility], last oxycodone fill 9/10/2024 #120    Requested Prescriptions     Pending Prescriptions Disp Refills    oxyCODONE-acetaminophen (PERCOCET) 7.5-325 MG per tablet 120 tablet 0     Sig: Take 1 tablet by mouth every 6 hours as needed for Pain for up to 30 days. Max Daily Amount: 4 tablets       Last Appointment Date: 8/16/2024  Next Appointment Date: 12/19/2024    Allergies   Allergen Reactions    Atorvastatin      Other reaction(s): Muscle Pain    Codeine     Diltiazem Hcl Hives    Levofloxacin      Other reaction(s): Intolerance-unknown    Pregabalin      lyrica    Simvastatin      Other reaction(s): Muscle Pain

## 2024-11-26 ENCOUNTER — PATIENT MESSAGE (OUTPATIENT)
Dept: FAMILY MEDICINE CLINIC | Age: 88
End: 2024-11-26

## 2024-11-26 DIAGNOSIS — G25.81 RESTLESS LEGS: ICD-10-CM

## 2024-11-26 DIAGNOSIS — G89.29 CHRONIC BILATERAL LOW BACK PAIN WITHOUT SCIATICA: Primary | ICD-10-CM

## 2024-11-26 DIAGNOSIS — M54.50 CHRONIC BILATERAL LOW BACK PAIN WITHOUT SCIATICA: Primary | ICD-10-CM

## 2024-11-26 RX ORDER — ROPINIROLE 0.5 MG/1
0.5 TABLET, FILM COATED ORAL NIGHTLY
Qty: 90 TABLET | Refills: 1 | Status: SHIPPED | OUTPATIENT
Start: 2024-11-26

## 2024-11-26 RX ORDER — DULOXETIN HYDROCHLORIDE 20 MG/1
20 CAPSULE, DELAYED RELEASE ORAL DAILY
Qty: 30 CAPSULE | Refills: 1 | Status: SHIPPED | OUTPATIENT
Start: 2024-11-26

## 2024-11-26 NOTE — PROGRESS NOTES
Daughter called to say that she has been off of her percocet for 3 weeks other than one at night due to a shortage at the pharmacy. She is still acting desperate for meds. She complains of leg pain and she is pacing the floor at night. I will try cymbalta and increasing the requip. I also suggested 6 tylenol a day.

## 2024-11-28 DIAGNOSIS — G89.29 CHRONIC BILATERAL LOW BACK PAIN WITHOUT SCIATICA: ICD-10-CM

## 2024-11-28 DIAGNOSIS — M54.50 CHRONIC BILATERAL LOW BACK PAIN WITHOUT SCIATICA: ICD-10-CM

## 2024-12-02 ENCOUNTER — PATIENT MESSAGE (OUTPATIENT)
Dept: FAMILY MEDICINE CLINIC | Age: 88
End: 2024-12-02

## 2024-12-02 DIAGNOSIS — G89.29 CHRONIC BILATERAL LOW BACK PAIN WITHOUT SCIATICA: ICD-10-CM

## 2024-12-02 DIAGNOSIS — M54.50 CHRONIC BILATERAL LOW BACK PAIN WITHOUT SCIATICA: ICD-10-CM

## 2024-12-02 RX ORDER — BUSPIRONE HYDROCHLORIDE 10 MG/1
10 TABLET ORAL 3 TIMES DAILY PRN
Qty: 270 TABLET | Refills: 1 | Status: SHIPPED | OUTPATIENT
Start: 2024-12-02

## 2024-12-02 RX ORDER — BUSPIRONE HYDROCHLORIDE 10 MG/1
10 TABLET ORAL 3 TIMES DAILY PRN
Qty: 270 TABLET | Refills: 1 | OUTPATIENT
Start: 2024-12-02

## 2024-12-02 RX ORDER — GABAPENTIN 300 MG/1
300 CAPSULE ORAL 2 TIMES DAILY
Qty: 180 CAPSULE | Refills: 0 | Status: SHIPPED | OUTPATIENT
Start: 2024-12-02 | End: 2025-03-02

## 2024-12-02 RX ORDER — GABAPENTIN 300 MG/1
CAPSULE ORAL
Qty: 180 CAPSULE | Refills: 0 | OUTPATIENT
Start: 2024-12-02 | End: 2025-03-02

## 2024-12-02 NOTE — TELEPHONE ENCOUNTER
Lara called requesting a refill of the below medication which has been pended for you:     Requested Prescriptions     Pending Prescriptions Disp Refills    busPIRone (BUSPAR) 10 MG tablet 270 tablet 1     Sig: Take 1 tablet by mouth 3 times daily as needed (anxiety)       Last Appointment Date: 8/16/2024  Next Appointment Date: 12/2/2024    Allergies   Allergen Reactions    Atorvastatin      Other reaction(s): Muscle Pain    Codeine     Diltiazem Hcl Hives    Levofloxacin      Other reaction(s): Intolerance-unknown    Pregabalin      lyrica    Simvastatin      Other reaction(s): Muscle Pain

## 2024-12-02 NOTE — TELEPHONE ENCOUNTER
Lara called requesting a refill of the below medication which has been pended for you:   Per [facility] last fill date was 09/05/2024 quantity 180 for 90 days  Requested Prescriptions     Pending Prescriptions Disp Refills    gabapentin (NEURONTIN) 300 MG capsule [Pharmacy Med Name: Gabapentin 300 MG Oral Capsule] 180 capsule 0     Sig: Take 1 capsule by mouth 2 times daily.       Last Appointment Date: 8/16/2024  Next Appointment Date: 12/2/2024    Allergies   Allergen Reactions    Atorvastatin      Other reaction(s): Muscle Pain    Codeine     Diltiazem Hcl Hives    Levofloxacin      Other reaction(s): Intolerance-unknown    Pregabalin      lyrica    Simvastatin      Other reaction(s): Muscle Pain

## 2024-12-05 RX ORDER — FLUOXETINE 10 MG/1
10 CAPSULE ORAL DAILY
Qty: 30 CAPSULE | Refills: 0 | OUTPATIENT
Start: 2024-12-05

## 2024-12-13 RX ORDER — FLUOXETINE 10 MG/1
10 CAPSULE ORAL DAILY
Qty: 30 CAPSULE | Refills: 0 | OUTPATIENT
Start: 2024-12-13

## 2024-12-13 NOTE — TELEPHONE ENCOUNTER
Pt daughter calling saying the pharmacy called her to get this refill. Send to pended pharmacy, please advise.

## 2024-12-19 ENCOUNTER — OFFICE VISIT (OUTPATIENT)
Dept: FAMILY MEDICINE CLINIC | Age: 88
End: 2024-12-19

## 2024-12-19 VITALS
WEIGHT: 79.9 LBS | SYSTOLIC BLOOD PRESSURE: 124 MMHG | HEIGHT: 61 IN | HEART RATE: 88 BPM | OXYGEN SATURATION: 97 % | BODY MASS INDEX: 15.08 KG/M2 | DIASTOLIC BLOOD PRESSURE: 62 MMHG

## 2024-12-19 DIAGNOSIS — M54.50 CHRONIC BILATERAL LOW BACK PAIN WITHOUT SCIATICA: Primary | ICD-10-CM

## 2024-12-19 DIAGNOSIS — F41.9 ANXIETY: ICD-10-CM

## 2024-12-19 DIAGNOSIS — G89.29 CHRONIC BILATERAL LOW BACK PAIN WITHOUT SCIATICA: Primary | ICD-10-CM

## 2024-12-19 DIAGNOSIS — G25.81 RESTLESS LEGS: ICD-10-CM

## 2024-12-19 DIAGNOSIS — L98.9 FACIAL LESION: ICD-10-CM

## 2024-12-19 RX ORDER — DULOXETIN HYDROCHLORIDE 20 MG/1
20 CAPSULE, DELAYED RELEASE ORAL DAILY
Qty: 30 CAPSULE | Refills: 0 | Status: SHIPPED | OUTPATIENT
Start: 2024-12-19

## 2024-12-19 RX ORDER — ROPINIROLE 0.25 MG/1
TABLET, FILM COATED ORAL
Qty: 120 TABLET | Refills: 0 | Status: SHIPPED | OUTPATIENT
Start: 2024-12-19

## 2024-12-19 ASSESSMENT — ENCOUNTER SYMPTOMS
SHORTNESS OF BREATH: 0
COUGH: 0
CHEST TIGHTNESS: 0
WHEEZING: 0

## 2024-12-19 NOTE — PROGRESS NOTES
[facility]  [address] [facility] [address]  Dept: [phone]  Dept Fax: [phone]  Loc: [phone]    Lara Reid is a 89 y.o. female who presents today for her medical conditions/complaints as noted below.  Lara Reid is c/o of   Chief Complaint   Patient presents with    Knee Pain     4 months        HPI:     History of Present Illness  The patient is a 89-year-old female here today for a follow-up of her knee pain, anxiety, depression, restless legs, weight loss, headache, and skin lesion. She is accompanied by her daughter and granddaughter.    She reports persistent leg pain, which has not been alleviated by scheduled Tylenol. She finds Percocet more effective than Tylenol, but her daughter did not think the percocet was helping and her daughter felt she was asking for the percocet too often. She is on scheduled tylenol right now    She has been experiencing increased sleepiness recently. She does not report any feelings of sadness or depression, although her daughter believes she may be experiencing these symptoms. She does not experience lightheadedness or dizziness upon standing. She occasionally experiences right-sided headaches, similar to sinus headaches, which are relieved by Sudafed. She does not feel like she is going to pass out and has not had any falls. Her sleep quality at night is satisfactory. She is on buspirone 3 times a day.    She has a scaly patch on the right side of her face, which was first noticed last night, by her granddaughter while assisting her with oxygen. This patch is located where her oxygen mask sits. She has not had a recent dermatology consultation.    She has been on Prozac but does not believe it is beneficial. She has not taken Cymbalta due to an adverse reaction. She continues to take buspirone 3 times daily. She is currently on gabapentin twice daily, which she

## 2025-01-02 ENCOUNTER — PATIENT MESSAGE (OUTPATIENT)
Dept: FAMILY MEDICINE CLINIC | Age: 89
End: 2025-01-02

## 2025-01-21 DIAGNOSIS — G25.81 RESTLESS LEGS: ICD-10-CM

## 2025-01-21 RX ORDER — ROPINIROLE 0.25 MG/1
TABLET, FILM COATED ORAL
Qty: 120 TABLET | Refills: 2 | Status: SHIPPED | OUTPATIENT
Start: 2025-01-21

## 2025-01-21 NOTE — TELEPHONE ENCOUNTER
Lara called requesting a refill of the below medication which has been pended for you:     Requested Prescriptions     Pending Prescriptions Disp Refills    rOPINIRole (REQUIP) 0.25 MG tablet [Pharmacy Med Name: rOPINIRole HCl 0.25 MG Oral Tablet] 90 tablet 0     Sig: Take 1 tablet by mouth nightly       Last Appointment Date: 12/19/2024  Next Appointment Date: 3/20/2025    Allergies   Allergen Reactions    Atorvastatin      Other reaction(s): Muscle Pain    Codeine     Diltiazem Hcl Hives    Levofloxacin      Other reaction(s): Intolerance-unknown    Pregabalin      lyrica    Simvastatin      Other reaction(s): Muscle Pain

## 2025-02-26 RX ORDER — ISOSORBIDE MONONITRATE 30 MG/1
30 TABLET, EXTENDED RELEASE ORAL DAILY
Qty: 90 TABLET | Refills: 0 | Status: SHIPPED | OUTPATIENT
Start: 2025-02-26

## 2025-02-26 NOTE — TELEPHONE ENCOUNTER
Lara called requesting a refill of the below medication which has been pended for you:     Requested Prescriptions     Pending Prescriptions Disp Refills    isosorbide mononitrate (IMDUR) 30 MG extended release tablet [Pharmacy Med Name: Isosorbide Mononitrate ER 30 MG Oral Tablet Extended Release 24 Hour] 90 tablet 0     Sig: Take 1 tablet by mouth once daily       Last Appointment Date: 12/19/2024  Next Appointment Date: 3/20/2025    Allergies   Allergen Reactions    Atorvastatin      Other reaction(s): Muscle Pain    Codeine     Diltiazem Hcl Hives    Levofloxacin      Other reaction(s): Intolerance-unknown    Pregabalin      lyrica    Simvastatin      Other reaction(s): Muscle Pain